# Patient Record
Sex: MALE | Race: BLACK OR AFRICAN AMERICAN | NOT HISPANIC OR LATINO | Employment: UNEMPLOYED | URBAN - METROPOLITAN AREA
[De-identification: names, ages, dates, MRNs, and addresses within clinical notes are randomized per-mention and may not be internally consistent; named-entity substitution may affect disease eponyms.]

---

## 2017-01-12 ENCOUNTER — HISTORICAL (OUTPATIENT)
Dept: INTERNAL MEDICINE | Facility: CLINIC | Age: 67
End: 2017-01-12

## 2017-02-06 ENCOUNTER — HISTORICAL (OUTPATIENT)
Dept: INTERNAL MEDICINE | Facility: CLINIC | Age: 67
End: 2017-02-06

## 2017-04-18 ENCOUNTER — HISTORICAL (OUTPATIENT)
Dept: INTERNAL MEDICINE | Facility: CLINIC | Age: 67
End: 2017-04-18

## 2017-08-28 LAB
COLOR STL: NORMAL
CONSISTENCY STL: NORMAL
HEMOCCULT SP1 STL QL: NEGATIVE

## 2017-08-29 LAB
COLOR STL: NORMAL
CONSISTENCY STL: NORMAL
HEMOCCULT SP2 STL QL: NEGATIVE

## 2017-08-30 LAB
COLOR STL: NORMAL
CONSISTENCY STL: NORMAL

## 2017-09-01 ENCOUNTER — HISTORICAL (OUTPATIENT)
Dept: INTERNAL MEDICINE | Facility: CLINIC | Age: 67
End: 2017-09-01

## 2018-02-16 ENCOUNTER — HISTORICAL (OUTPATIENT)
Dept: INTERNAL MEDICINE | Facility: CLINIC | Age: 68
End: 2018-02-16

## 2018-02-16 LAB
ABS NEUT (OLG): 3.01 X10(3)/MCL (ref 2.1–9.2)
ALBUMIN SERPL-MCNC: 3.4 GM/DL (ref 3.4–5)
ALBUMIN/GLOB SERPL: 1 RATIO (ref 1–2)
ALP SERPL-CCNC: 58 UNIT/L (ref 45–117)
ALT SERPL-CCNC: 26 UNIT/L (ref 12–78)
APPEARANCE, UA: CLEAR
AST SERPL-CCNC: 15 UNIT/L (ref 15–37)
BACTERIA #/AREA URNS AUTO: ABNORMAL /[HPF]
BASOPHILS # BLD AUTO: 0.03 X10(3)/MCL
BASOPHILS NFR BLD AUTO: 1 % (ref 0–1)
BILIRUB SERPL-MCNC: 0.6 MG/DL (ref 0.2–1)
BILIRUB UR QL STRIP: NEGATIVE
BILIRUBIN DIRECT+TOT PNL SERPL-MCNC: 0.2 MG/DL
BILIRUBIN DIRECT+TOT PNL SERPL-MCNC: 0.4 MG/DL
BUN SERPL-MCNC: 20 MG/DL (ref 7–18)
CALCIUM SERPL-MCNC: 9.5 MG/DL (ref 8.5–10.1)
CHLORIDE SERPL-SCNC: 105 MMOL/L (ref 98–107)
CHOLEST SERPL-MCNC: 162 MG/DL
CHOLEST/HDLC SERPL: 2.4 {RATIO} (ref 0–5)
CO2 SERPL-SCNC: 26 MMOL/L (ref 21–32)
COLOR UR: YELLOW
CREAT SERPL-MCNC: 1.1 MG/DL (ref 0.6–1.3)
CREAT UR-MCNC: 214 MG/DL
DEPRECATED CALCIDIOL+CALCIFEROL SERPL-MC: 19.98 NG/ML (ref 30–80)
EOSINOPHIL # BLD AUTO: 0.12 X10(3)/MCL
EOSINOPHIL NFR BLD AUTO: 2 % (ref 0–5)
ERYTHROCYTE [DISTWIDTH] IN BLOOD BY AUTOMATED COUNT: 12.9 % (ref 11.5–14.5)
EST. AVERAGE GLUCOSE BLD GHB EST-MCNC: 154 MG/DL
GLOBULIN SER-MCNC: 4 GM/ML (ref 2.3–3.5)
GLUCOSE (UA): NORMAL
GLUCOSE SERPL-MCNC: 149 MG/DL (ref 74–106)
HBA1C MFR BLD: 7 % (ref 4.2–6.3)
HCT VFR BLD AUTO: 40.2 % (ref 40–51)
HDLC SERPL-MCNC: 67 MG/DL
HGB BLD-MCNC: 13.5 GM/DL (ref 13.5–17.5)
HGB UR QL STRIP: NEGATIVE
HYALINE CASTS #/AREA URNS LPF: ABNORMAL /[LPF]
IMM GRANULOCYTES # BLD AUTO: 0.01 10*3/UL
IMM GRANULOCYTES NFR BLD AUTO: 0 %
KETONES UR QL STRIP: NEGATIVE
LDLC SERPL CALC-MCNC: 85 MG/DL (ref 0–130)
LEUKOCYTE ESTERASE UR QL STRIP: NEGATIVE
LYMPHOCYTES # BLD AUTO: 1.1 X10(3)/MCL
LYMPHOCYTES NFR BLD AUTO: 23 % (ref 15–40)
MCH RBC QN AUTO: 30.8 PG (ref 26–34)
MCHC RBC AUTO-ENTMCNC: 33.6 GM/DL (ref 31–37)
MCV RBC AUTO: 91.6 FL (ref 80–100)
MONOCYTES # BLD AUTO: 0.45 X10(3)/MCL
MONOCYTES NFR BLD AUTO: 10 % (ref 4–12)
NEUTROPHILS # BLD AUTO: 3.01 X10(3)/MCL
NEUTROPHILS NFR BLD AUTO: 64 X10(3)/MCL
NITRITE UR QL STRIP: NEGATIVE
PH UR STRIP: 5.5 [PH] (ref 4.5–8)
PLATELET # BLD AUTO: 240 X10(3)/MCL (ref 130–400)
PMV BLD AUTO: 10.5 FL (ref 7.4–10.4)
POTASSIUM SERPL-SCNC: 4.5 MMOL/L (ref 3.5–5.1)
PROT SERPL-MCNC: 7.4 GM/DL (ref 6.4–8.2)
PROT UR QL STRIP: NEGATIVE
PROT UR STRIP-MCNC: 12.7 MG/DL
PROT/CREAT UR-RTO: 59.3 MG/GM
RBC # BLD AUTO: 4.39 X10(6)/MCL (ref 4.5–5.9)
RBC #/AREA URNS AUTO: ABNORMAL /[HPF]
SODIUM SERPL-SCNC: 140 MMOL/L (ref 136–145)
SP GR UR STRIP: 1.02 (ref 1–1.03)
SQUAMOUS #/AREA URNS LPF: ABNORMAL /[LPF]
TRIGL SERPL-MCNC: 50 MG/DL
TSH SERPL-ACNC: 1.66 MIU/L (ref 0.36–3.74)
UROBILINOGEN UR STRIP-ACNC: NORMAL
VLDLC SERPL CALC-MCNC: 10 MG/DL
WBC # SPEC AUTO: 4.7 X10(3)/MCL (ref 4.5–11)
WBC #/AREA URNS AUTO: ABNORMAL /HPF

## 2018-05-01 ENCOUNTER — HISTORICAL (OUTPATIENT)
Dept: INTERNAL MEDICINE | Facility: CLINIC | Age: 68
End: 2018-05-01

## 2018-05-01 LAB
ABS NEUT (OLG): 2.11 X10(3)/MCL (ref 2.1–9.2)
ALBUMIN SERPL-MCNC: 3.6 GM/DL (ref 3.4–5)
ALBUMIN/GLOB SERPL: 1 RATIO (ref 1–2)
ALP SERPL-CCNC: 66 UNIT/L (ref 45–117)
ALT SERPL-CCNC: 31 UNIT/L (ref 12–78)
AST SERPL-CCNC: 22 UNIT/L (ref 15–37)
BASOPHILS # BLD AUTO: 0.02 X10(3)/MCL
BASOPHILS NFR BLD AUTO: 1 %
BILIRUB SERPL-MCNC: 0.4 MG/DL (ref 0.2–1)
BILIRUBIN DIRECT+TOT PNL SERPL-MCNC: 0.1 MG/DL
BILIRUBIN DIRECT+TOT PNL SERPL-MCNC: 0.3 MG/DL
BUN SERPL-MCNC: 26 MG/DL (ref 7–18)
CALCIUM SERPL-MCNC: 9.2 MG/DL (ref 8.5–10.1)
CHLORIDE SERPL-SCNC: 105 MMOL/L (ref 98–107)
CO2 SERPL-SCNC: 31 MMOL/L (ref 21–32)
CREAT SERPL-MCNC: 1.1 MG/DL (ref 0.6–1.3)
EOSINOPHIL # BLD AUTO: 0.12 X10(3)/MCL
EOSINOPHIL NFR BLD AUTO: 3 %
ERYTHROCYTE [DISTWIDTH] IN BLOOD BY AUTOMATED COUNT: 13.2 % (ref 11.5–14.5)
EST. AVERAGE GLUCOSE BLD GHB EST-MCNC: 154 MG/DL
GLOBULIN SER-MCNC: 3.6 GM/ML (ref 2.3–3.5)
GLUCOSE SERPL-MCNC: 209 MG/DL (ref 74–106)
HBA1C MFR BLD: 7 % (ref 4.2–6.3)
HCT VFR BLD AUTO: 40.6 % (ref 40–51)
HGB BLD-MCNC: 13.5 GM/DL (ref 13.5–17.5)
LYMPHOCYTES # BLD AUTO: 1.01 X10(3)/MCL
LYMPHOCYTES NFR BLD AUTO: 29 % (ref 13–40)
MCH RBC QN AUTO: 31 PG (ref 26–34)
MCHC RBC AUTO-ENTMCNC: 33.3 GM/DL (ref 31–37)
MCV RBC AUTO: 93.1 FL (ref 80–100)
MONOCYTES # BLD AUTO: 0.26 X10(3)/MCL
MONOCYTES NFR BLD AUTO: 7 % (ref 4–12)
NEUTROPHILS # BLD AUTO: 2.11 X10(3)/MCL
NEUTROPHILS NFR BLD AUTO: 60 X10(3)/MCL
PLATELET # BLD AUTO: 226 X10(3)/MCL (ref 130–400)
PMV BLD AUTO: 10.5 FL (ref 7.4–10.4)
POTASSIUM SERPL-SCNC: 4.4 MMOL/L (ref 3.5–5.1)
PROT SERPL-MCNC: 7.2 GM/DL (ref 6.4–8.2)
RBC # BLD AUTO: 4.36 X10(6)/MCL (ref 4.5–5.9)
SODIUM SERPL-SCNC: 138 MMOL/L (ref 136–145)
WBC # SPEC AUTO: 3.5 X10(3)/MCL (ref 4.5–11)

## 2018-05-27 ENCOUNTER — HISTORICAL (OUTPATIENT)
Dept: LAB | Facility: HOSPITAL | Age: 68
End: 2018-05-27

## 2018-05-27 LAB
ABS NEUT (OLG): 1.99 X10(3)/MCL (ref 2.1–9.2)
ALBUMIN SERPL-MCNC: 3.4 GM/DL (ref 3.4–5)
ALBUMIN/GLOB SERPL: 1 RATIO (ref 1–2)
ALP SERPL-CCNC: 57 UNIT/L (ref 45–117)
ALT SERPL-CCNC: 24 UNIT/L (ref 12–78)
APPEARANCE, UA: CLEAR
AST SERPL-CCNC: 17 UNIT/L (ref 15–37)
BACTERIA #/AREA URNS AUTO: ABNORMAL /[HPF]
BASOPHILS # BLD AUTO: 0.02 X10(3)/MCL
BASOPHILS NFR BLD AUTO: 1 %
BILIRUB SERPL-MCNC: 0.4 MG/DL (ref 0.2–1)
BILIRUB UR QL STRIP: NEGATIVE
BILIRUBIN DIRECT+TOT PNL SERPL-MCNC: 0.1 MG/DL
BILIRUBIN DIRECT+TOT PNL SERPL-MCNC: 0.3 MG/DL
BUN SERPL-MCNC: 27 MG/DL (ref 7–18)
CALCIUM SERPL-MCNC: 8.5 MG/DL (ref 8.5–10.1)
CHLORIDE SERPL-SCNC: 109 MMOL/L (ref 98–107)
CO2 SERPL-SCNC: 26 MMOL/L (ref 21–32)
COLOR UR: NORMAL
CREAT SERPL-MCNC: 1.1 MG/DL (ref 0.6–1.3)
CREAT UR-MCNC: 120 MG/DL
DEPRECATED CALCIDIOL+CALCIFEROL SERPL-MC: 25.57 NG/ML (ref 30–80)
EOSINOPHIL # BLD AUTO: 0.12 X10(3)/MCL
EOSINOPHIL NFR BLD AUTO: 4 %
ERYTHROCYTE [DISTWIDTH] IN BLOOD BY AUTOMATED COUNT: 13.1 % (ref 11.5–14.5)
GLOBULIN SER-MCNC: 3.4 GM/ML (ref 2.3–3.5)
GLUCOSE (UA): NORMAL
GLUCOSE SERPL-MCNC: 144 MG/DL (ref 74–106)
HCT VFR BLD AUTO: 38.5 % (ref 40–51)
HGB BLD-MCNC: 12.7 GM/DL (ref 13.5–17.5)
HGB UR QL STRIP: NEGATIVE
HYALINE CASTS #/AREA URNS LPF: ABNORMAL /[LPF]
KETONES UR QL STRIP: NEGATIVE
LEUKOCYTE ESTERASE UR QL STRIP: NEGATIVE
LYMPHOCYTES # BLD AUTO: 0.85 X10(3)/MCL
LYMPHOCYTES NFR BLD AUTO: 26 % (ref 13–40)
MAGNESIUM SERPL-MCNC: 1.7 MG/DL (ref 1.8–2.4)
MCH RBC QN AUTO: 30.5 PG (ref 26–34)
MCHC RBC AUTO-ENTMCNC: 33 GM/DL (ref 31–37)
MCV RBC AUTO: 92.5 FL (ref 80–100)
MONOCYTES # BLD AUTO: 0.29 X10(3)/MCL
MONOCYTES NFR BLD AUTO: 9 % (ref 4–12)
NEUTROPHILS # BLD AUTO: 1.99 X10(3)/MCL
NEUTROPHILS NFR BLD AUTO: 61 X10(3)/MCL
NITRITE UR QL STRIP: NEGATIVE
PH UR STRIP: 5.5 [PH] (ref 4.5–8)
PHOSPHATE SERPL-MCNC: 2.7 MG/DL (ref 2.5–4.9)
PLATELET # BLD AUTO: 196 X10(3)/MCL (ref 130–400)
PMV BLD AUTO: 10.2 FL (ref 7.4–10.4)
POTASSIUM SERPL-SCNC: 4.3 MMOL/L (ref 3.5–5.1)
PROT SERPL-MCNC: 6.8 GM/DL (ref 6.4–8.2)
PROT UR QL STRIP: NEGATIVE
PROT UR STRIP-MCNC: 11.6 MG/DL
PROT/CREAT UR-RTO: 96.7 MG/GM
PTH-INTACT SERPL-MCNC: 53.2 PG/ML (ref 18.4–80.1)
RBC # BLD AUTO: 4.16 X10(6)/MCL (ref 4.5–5.9)
RBC #/AREA URNS AUTO: ABNORMAL /[HPF]
SODIUM SERPL-SCNC: 141 MMOL/L (ref 136–145)
SP GR UR STRIP: 1.02 (ref 1–1.03)
SQUAMOUS #/AREA URNS LPF: ABNORMAL /[LPF]
UROBILINOGEN UR STRIP-ACNC: NORMAL
WBC # SPEC AUTO: 3.3 X10(3)/MCL (ref 4.5–11)
WBC #/AREA URNS AUTO: ABNORMAL /HPF

## 2018-08-23 ENCOUNTER — HISTORICAL (OUTPATIENT)
Dept: RADIOLOGY | Facility: HOSPITAL | Age: 68
End: 2018-08-23

## 2018-08-23 ENCOUNTER — HISTORICAL (OUTPATIENT)
Dept: ADMINISTRATIVE | Facility: HOSPITAL | Age: 68
End: 2018-08-23

## 2018-08-23 LAB
ABS NEUT (OLG): 3.64 X10(3)/MCL (ref 2.1–9.2)
BASOPHILS # BLD AUTO: 0.03 X10(3)/MCL
BASOPHILS NFR BLD AUTO: 1 %
EOSINOPHIL # BLD AUTO: 0.09 10*3/UL
EOSINOPHIL NFR BLD AUTO: 2 %
ERYTHROCYTE [DISTWIDTH] IN BLOOD BY AUTOMATED COUNT: 13.3 % (ref 11.5–14.5)
EST. AVERAGE GLUCOSE BLD GHB EST-MCNC: 148 MG/DL
HBA1C MFR BLD: 6.8 % (ref 4.2–6.3)
HCT VFR BLD AUTO: 37.4 % (ref 40–51)
HGB BLD-MCNC: 12.4 GM/DL (ref 13.5–17.5)
IMM GRANULOCYTES # BLD AUTO: 0.01 10*3/UL
IMM GRANULOCYTES NFR BLD AUTO: 0 %
LYMPHOCYTES # BLD AUTO: 1.09 X10(3)/MCL
LYMPHOCYTES NFR BLD AUTO: 20 % (ref 13–40)
MCH RBC QN AUTO: 30.8 PG (ref 26–34)
MCHC RBC AUTO-ENTMCNC: 33.2 GM/DL (ref 31–37)
MCV RBC AUTO: 92.8 FL (ref 80–100)
MONOCYTES # BLD AUTO: 0.54 X10(3)/MCL
MONOCYTES NFR BLD AUTO: 10 % (ref 4–12)
NEUTROPHILS # BLD AUTO: 3.64 X10(3)/MCL
NEUTROPHILS NFR BLD AUTO: 67 X10(3)/MCL
PLATELET # BLD AUTO: 288 X10(3)/MCL (ref 130–400)
PMV BLD AUTO: 9.7 FL (ref 7.4–10.4)
RBC # BLD AUTO: 4.03 X10(6)/MCL (ref 4.5–5.9)
WBC # SPEC AUTO: 5.4 X10(3)/MCL (ref 4.5–11)

## 2018-09-06 ENCOUNTER — HISTORICAL (OUTPATIENT)
Dept: RADIOLOGY | Facility: HOSPITAL | Age: 68
End: 2018-09-06

## 2018-09-06 LAB
ABS NEUT (OLG): 2.7 X10(3)/MCL (ref 2.1–9.2)
ALBUMIN SERPL-MCNC: 3.8 GM/DL (ref 3.4–5)
ALBUMIN/GLOB SERPL: 1 RATIO (ref 1–2)
ALP SERPL-CCNC: 70 UNIT/L (ref 45–117)
ALT SERPL-CCNC: 29 UNIT/L (ref 12–78)
APPEARANCE, UA: CLEAR
AST SERPL-CCNC: 20 UNIT/L (ref 15–37)
BASOPHILS # BLD AUTO: 0.02 X10(3)/MCL
BASOPHILS NFR BLD AUTO: 0 %
BILIRUB SERPL-MCNC: 0.3 MG/DL (ref 0.2–1)
BILIRUB UR QL STRIP: NEGATIVE MG/DL
BILIRUBIN DIRECT+TOT PNL SERPL-MCNC: <0.1 MG/DL
BILIRUBIN DIRECT+TOT PNL SERPL-MCNC: ABNORMAL MG/DL
BUN SERPL-MCNC: 33 MG/DL (ref 7–18)
CALCIUM SERPL-MCNC: 9.3 MG/DL (ref 8.5–10.1)
CHLORIDE SERPL-SCNC: 105 MMOL/L (ref 98–107)
CO2 SERPL-SCNC: 31 MMOL/L (ref 21–32)
COLOR UR: NORMAL
CREAT SERPL-MCNC: 1.2 MG/DL (ref 0.6–1.3)
CREAT UR-MCNC: 137 MG/DL
DEPRECATED CALCIDIOL+CALCIFEROL SERPL-MC: 33.22 NG/ML (ref 30–80)
EOSINOPHIL # BLD AUTO: 0.11 10*3/UL
EOSINOPHIL NFR BLD AUTO: 3 %
ERYTHROCYTE [DISTWIDTH] IN BLOOD BY AUTOMATED COUNT: 13.3 % (ref 11.5–14.5)
GLOBULIN SER-MCNC: 4 GM/ML (ref 2.3–3.5)
GLUCOSE (UA): NORMAL MG/DL
GLUCOSE SERPL-MCNC: 154 MG/DL (ref 74–106)
HCT VFR BLD AUTO: 38.4 % (ref 40–51)
HGB BLD-MCNC: 12.5 GM/DL (ref 13.5–17.5)
HGB UR QL STRIP: NEGATIVE MG/DL
IMM GRANULOCYTES # BLD AUTO: 0.01 10*3/UL
IMM GRANULOCYTES NFR BLD AUTO: 0 %
KETONES UR QL STRIP: NEGATIVE MG/DL
LEUKOCYTE ESTERASE UR QL STRIP: NEGATIVE LEU/UL
LYMPHOCYTES # BLD AUTO: 1.08 X10(3)/MCL
LYMPHOCYTES NFR BLD AUTO: 25 % (ref 13–40)
MAGNESIUM SERPL-MCNC: 2.2 MG/DL (ref 1.8–2.4)
MCH RBC QN AUTO: 30.2 PG (ref 26–34)
MCHC RBC AUTO-ENTMCNC: 32.6 GM/DL (ref 31–37)
MCV RBC AUTO: 92.8 FL (ref 80–100)
MONOCYTES # BLD AUTO: 0.35 X10(3)/MCL
MONOCYTES NFR BLD AUTO: 8 % (ref 4–12)
NEUTROPHILS # BLD AUTO: 2.7 X10(3)/MCL
NEUTROPHILS NFR BLD AUTO: 63 X10(3)/MCL
NITRITE UR QL STRIP: NEGATIVE
PH UR STRIP: 5.5 [PH] (ref 4.5–8)
PHOSPHATE SERPL-MCNC: 3.5 MG/DL (ref 2.5–4.9)
PLATELET # BLD AUTO: 261 X10(3)/MCL (ref 130–400)
PMV BLD AUTO: 10.1 FL (ref 7.4–10.4)
POTASSIUM SERPL-SCNC: 4.7 MMOL/L (ref 3.5–5.1)
PROT SERPL-MCNC: 7.8 GM/DL (ref 6.4–8.2)
PROT UR QL STRIP: NEGATIVE MG/DL
PROT UR STRIP-MCNC: 11.3 MG/DL
PROT/CREAT UR-RTO: 82.5 MG/GM
PTH-INTACT SERPL-MCNC: 53.4 PG/ML (ref 18.4–80.1)
RBC # BLD AUTO: 4.14 X10(6)/MCL (ref 4.5–5.9)
SODIUM SERPL-SCNC: 139 MMOL/L (ref 136–145)
SP GR UR STRIP: 1.02 (ref 1–1.03)
UROBILINOGEN UR STRIP-ACNC: NORMAL MG/DL
WBC # SPEC AUTO: 4.3 X10(3)/MCL (ref 4.5–11)

## 2019-02-27 ENCOUNTER — HISTORICAL (OUTPATIENT)
Dept: ADMINISTRATIVE | Facility: HOSPITAL | Age: 69
End: 2019-02-27

## 2019-02-27 LAB
CHOLEST SERPL-MCNC: 181 MG/DL
CHOLEST/HDLC SERPL: 2.4 {RATIO} (ref 0–5)
CREAT UR-MCNC: 97 MG/DL
EST. AVERAGE GLUCOSE BLD GHB EST-MCNC: 157 MG/DL
FERRITIN SERPL-MCNC: 86.6 NG/ML (ref 26–388)
FOLATE SERPL-MCNC: 14.7 NG/ML (ref 3.1–17.5)
HBA1C MFR BLD: 7.1 % (ref 4.2–6.3)
HDLC SERPL-MCNC: 75 MG/DL
IRON SATN MFR SERPL: 33 % (ref 15–50)
IRON SERPL-MCNC: 105 MCG/DL (ref 65–175)
LDLC SERPL CALC-MCNC: 93 MG/DL (ref 0–130)
MICROALBUMIN UR-MCNC: <5 MG/L (ref 0–19)
MICROALBUMIN/CREAT RATIO PNL UR: <5.2 MCG/MG CR (ref 0–29)
TIBC SERPL-MCNC: 318 MCG/DL (ref 250–450)
TRANSFERRIN SERPL-MCNC: 262 MG/DL (ref 200–360)
TRIGL SERPL-MCNC: 63 MG/DL
VIT B12 SERPL-MCNC: 330 PG/ML (ref 193–986)
VLDLC SERPL CALC-MCNC: 13 MG/DL

## 2019-05-13 ENCOUNTER — HISTORICAL (OUTPATIENT)
Dept: NEPHROLOGY | Facility: CLINIC | Age: 69
End: 2019-05-13

## 2019-05-13 LAB
ABS NEUT (OLG): 2.46 X10(3)/MCL (ref 2.1–9.2)
ALBUMIN SERPL-MCNC: 3.8 GM/DL (ref 3.4–5)
ALBUMIN/GLOB SERPL: 1.1 RATIO (ref 1.1–2)
ALP SERPL-CCNC: 62 UNIT/L (ref 45–117)
ALT SERPL-CCNC: 34 UNIT/L (ref 12–78)
APPEARANCE, UA: CLEAR
AST SERPL-CCNC: 22 UNIT/L (ref 15–37)
BACTERIA #/AREA URNS AUTO: ABNORMAL /[HPF]
BASOPHILS # BLD AUTO: 0.02 X10(3)/MCL
BASOPHILS NFR BLD AUTO: 0 %
BILIRUB SERPL-MCNC: 0.5 MG/DL (ref 0.2–1)
BILIRUB UR QL STRIP: NEGATIVE
BILIRUBIN DIRECT+TOT PNL SERPL-MCNC: 0.2 MG/DL
BILIRUBIN DIRECT+TOT PNL SERPL-MCNC: 0.3 MG/DL
BUN SERPL-MCNC: 23 MG/DL (ref 7–18)
CALCIUM SERPL-MCNC: 9.6 MG/DL (ref 8.5–10.1)
CHLORIDE SERPL-SCNC: 103 MMOL/L (ref 98–107)
CO2 SERPL-SCNC: 31 MMOL/L (ref 21–32)
COLOR UR: COLORLESS
CREAT SERPL-MCNC: 1.3 MG/DL (ref 0.6–1.3)
CREAT UR-MCNC: 34 MG/DL
EOSINOPHIL # BLD AUTO: 0.08 10*3/UL
EOSINOPHIL NFR BLD AUTO: 2 %
ERYTHROCYTE [DISTWIDTH] IN BLOOD BY AUTOMATED COUNT: 13 % (ref 11.5–14.5)
GLOBULIN SER-MCNC: 3.5 GM/ML (ref 2.3–3.5)
GLUCOSE (UA): 500 MG/DL
GLUCOSE SERPL-MCNC: 256 MG/DL (ref 74–106)
HCT VFR BLD AUTO: 41.5 % (ref 40–51)
HGB BLD-MCNC: 13.5 GM/DL (ref 13.5–17.5)
HGB UR QL STRIP: NEGATIVE
HYALINE CASTS #/AREA URNS LPF: ABNORMAL /[LPF]
IMM GRANULOCYTES # BLD AUTO: 0.02 10*3/UL
IMM GRANULOCYTES NFR BLD AUTO: 0 %
KETONES UR QL STRIP: NEGATIVE
LEUKOCYTE ESTERASE UR QL STRIP: NEGATIVE
LYMPHOCYTES # BLD AUTO: 1.06 X10(3)/MCL
LYMPHOCYTES NFR BLD AUTO: 27 % (ref 13–40)
MAGNESIUM SERPL-MCNC: 2 MG/DL (ref 1.6–2.6)
MCH RBC QN AUTO: 30.5 PG (ref 26–34)
MCHC RBC AUTO-ENTMCNC: 32.5 GM/DL (ref 31–37)
MCV RBC AUTO: 93.7 FL (ref 80–100)
MONOCYTES # BLD AUTO: 0.34 X10(3)/MCL
MONOCYTES NFR BLD AUTO: 8 % (ref 4–12)
NEUTROPHILS # BLD AUTO: 2.46 X10(3)/MCL
NEUTROPHILS NFR BLD AUTO: 62 X10(3)/MCL
NITRITE UR QL STRIP: NEGATIVE
PH UR STRIP: 5.5 [PH] (ref 4.5–8)
PHOSPHATE SERPL-MCNC: 3.1 MG/DL (ref 2.5–4.9)
PLATELET # BLD AUTO: 213 X10(3)/MCL (ref 130–400)
PMV BLD AUTO: 10.6 FL (ref 7.4–10.4)
POTASSIUM SERPL-SCNC: 4.2 MMOL/L (ref 3.5–5.1)
PROT SERPL-MCNC: 7.3 GM/DL (ref 6.4–8.2)
PROT UR QL STRIP: NEGATIVE
PROT UR STRIP-MCNC: <5 MG/DL
PROT/CREAT UR-RTO: NORMAL MG/GM
PTH-INTACT SERPL-MCNC: 49.6 PG/ML (ref 18.4–80.1)
RBC # BLD AUTO: 4.43 X10(6)/MCL (ref 4.5–5.9)
RBC #/AREA URNS AUTO: ABNORMAL /[HPF]
SODIUM SERPL-SCNC: 138 MMOL/L (ref 136–145)
SP GR UR STRIP: 1 (ref 1–1.03)
SQUAMOUS #/AREA URNS LPF: ABNORMAL /[LPF]
UROBILINOGEN UR STRIP-ACNC: NORMAL
WBC # SPEC AUTO: 4 X10(3)/MCL (ref 4.5–11)
WBC #/AREA URNS AUTO: ABNORMAL /HPF

## 2019-10-14 ENCOUNTER — HISTORICAL (OUTPATIENT)
Dept: RADIOLOGY | Facility: HOSPITAL | Age: 69
End: 2019-10-14

## 2019-10-14 LAB
ABS NEUT (OLG): 2.08 X10(3)/MCL (ref 2.1–9.2)
ALBUMIN SERPL-MCNC: 3.6 GM/DL (ref 3.4–5)
ALBUMIN/GLOB SERPL: 1 RATIO (ref 1.1–2)
ALP SERPL-CCNC: 60 UNIT/L (ref 45–117)
ALT SERPL-CCNC: 28 UNIT/L (ref 12–78)
AST SERPL-CCNC: 17 UNIT/L (ref 15–37)
BASOPHILS # BLD AUTO: 0 X10(3)/MCL (ref 0–0.2)
BASOPHILS NFR BLD AUTO: 1 %
BILIRUB SERPL-MCNC: 0.4 MG/DL (ref 0.2–1)
BILIRUBIN DIRECT+TOT PNL SERPL-MCNC: 0.1 MG/DL (ref 0–0.2)
BILIRUBIN DIRECT+TOT PNL SERPL-MCNC: 0.3 MG/DL
BUN SERPL-MCNC: 29 MG/DL (ref 7–18)
CALCIUM SERPL-MCNC: 9 MG/DL (ref 8.5–10.1)
CHLORIDE SERPL-SCNC: 103 MMOL/L (ref 98–107)
CHOLEST SERPL-MCNC: 143 MG/DL
CHOLEST/HDLC SERPL: 2 {RATIO} (ref 0–5)
CO2 SERPL-SCNC: 30 MMOL/L (ref 21–32)
CREAT SERPL-MCNC: 1.3 MG/DL (ref 0.6–1.3)
CREAT UR-MCNC: 20 MG/DL
CREAT UR-MCNC: 20 MG/DL
DEPRECATED CALCIDIOL+CALCIFEROL SERPL-MC: 45.16 NG/ML (ref 30–80)
EOSINOPHIL # BLD AUTO: 0.2 X10(3)/MCL (ref 0–0.9)
EOSINOPHIL NFR BLD AUTO: 5 %
ERYTHROCYTE [DISTWIDTH] IN BLOOD BY AUTOMATED COUNT: 12.8 % (ref 11.5–14.5)
EST. AVERAGE GLUCOSE BLD GHB EST-MCNC: 148 MG/DL
GLOBULIN SER-MCNC: 3.7 GM/ML (ref 2.3–3.5)
GLUCOSE SERPL-MCNC: 216 MG/DL (ref 74–106)
HBA1C MFR BLD: 6.8 % (ref 4.2–6.3)
HCT VFR BLD AUTO: 42.5 % (ref 40–51)
HDLC SERPL-MCNC: 70 MG/DL (ref 40–59)
HGB BLD-MCNC: 13.7 GM/DL (ref 13.5–17.5)
IMM GRANULOCYTES # BLD AUTO: 0.01 10*3/UL
IMM GRANULOCYTES NFR BLD AUTO: 0 %
LDLC SERPL CALC-MCNC: 66 MG/DL
LYMPHOCYTES # BLD AUTO: 1.1 X10(3)/MCL (ref 0.6–4.6)
LYMPHOCYTES NFR BLD AUTO: 29 %
MCH RBC QN AUTO: 30.7 PG (ref 26–34)
MCHC RBC AUTO-ENTMCNC: 32.2 GM/DL (ref 31–37)
MCV RBC AUTO: 95.3 FL (ref 80–100)
MICROALBUMIN UR-MCNC: <5 MG/L (ref 0–19)
MICROALBUMIN/CREAT RATIO PNL UR: NORMAL MCG/MG CR (ref 0–29)
MONOCYTES # BLD AUTO: 0.3 X10(3)/MCL (ref 0.1–1.3)
MONOCYTES NFR BLD AUTO: 9 %
NEUTROPHILS # BLD AUTO: 2.08 X10(3)/MCL (ref 2.1–9.2)
NEUTROPHILS NFR BLD AUTO: 56 %
PLATELET # BLD AUTO: 218 X10(3)/MCL (ref 130–400)
PMV BLD AUTO: 9.8 FL (ref 7.4–10.4)
POTASSIUM SERPL-SCNC: 4.3 MMOL/L (ref 3.5–5.1)
PROT SERPL-MCNC: 7.3 GM/DL (ref 6.4–8.2)
PROT UR STRIP-MCNC: <5 MG/DL
PROT/CREAT UR-RTO: NORMAL MG/GM
RBC # BLD AUTO: 4.46 X10(6)/MCL (ref 4.5–5.9)
SODIUM SERPL-SCNC: 138 MMOL/L (ref 136–145)
TRIGL SERPL-MCNC: 35 MG/DL
TSH SERPL-ACNC: 2.81 MIU/L (ref 0.36–3.74)
VIT B12 SERPL-MCNC: 328 PG/ML (ref 193–986)
VLDLC SERPL CALC-MCNC: 7 MG/DL
WBC # SPEC AUTO: 3.7 X10(3)/MCL (ref 4.5–11)

## 2019-11-18 ENCOUNTER — HISTORICAL (OUTPATIENT)
Dept: NEPHROLOGY | Facility: CLINIC | Age: 69
End: 2019-11-18

## 2019-11-18 LAB
ABS NEUT (OLG): 2.72 X10(3)/MCL (ref 2.1–9.2)
ALBUMIN SERPL-MCNC: 3.6 GM/DL (ref 3.4–5)
ALBUMIN/GLOB SERPL: 1 RATIO (ref 1.1–2)
ALP SERPL-CCNC: 61 UNIT/L (ref 45–117)
ALT SERPL-CCNC: 29 UNIT/L (ref 12–78)
APPEARANCE, UA: CLEAR
AST SERPL-CCNC: 20 UNIT/L (ref 15–37)
BACTERIA #/AREA URNS AUTO: ABNORMAL /HPF
BASOPHILS # BLD AUTO: 0 X10(3)/MCL (ref 0–0.2)
BASOPHILS NFR BLD AUTO: 0 %
BILIRUB SERPL-MCNC: 0.4 MG/DL (ref 0.2–1)
BILIRUB UR QL STRIP: NEGATIVE
BILIRUBIN DIRECT+TOT PNL SERPL-MCNC: 0.1 MG/DL (ref 0–0.2)
BILIRUBIN DIRECT+TOT PNL SERPL-MCNC: 0.3 MG/DL
BUN SERPL-MCNC: 22 MG/DL (ref 7–18)
CALCIUM SERPL-MCNC: 9.7 MG/DL (ref 8.5–10.1)
CHLORIDE SERPL-SCNC: 107 MMOL/L (ref 98–107)
CO2 SERPL-SCNC: 31 MMOL/L (ref 21–32)
COLOR UR: NORMAL
CREAT SERPL-MCNC: 1.1 MG/DL (ref 0.6–1.3)
CREAT UR-MCNC: 95 MG/DL
DEPRECATED CALCIDIOL+CALCIFEROL SERPL-MC: 28.09 NG/ML (ref 30–80)
EOSINOPHIL # BLD AUTO: 0.2 X10(3)/MCL (ref 0–0.9)
EOSINOPHIL NFR BLD AUTO: 5 %
ERYTHROCYTE [DISTWIDTH] IN BLOOD BY AUTOMATED COUNT: 13 % (ref 11.5–14.5)
GLOBULIN SER-MCNC: 3.7 GM/ML (ref 2.3–3.5)
GLUCOSE (UA): NEGATIVE
GLUCOSE SERPL-MCNC: 141 MG/DL (ref 74–106)
HCT VFR BLD AUTO: 41.4 % (ref 40–51)
HGB BLD-MCNC: 13.2 GM/DL (ref 13.5–17.5)
HGB UR QL STRIP: NEGATIVE
HYALINE CASTS #/AREA URNS LPF: ABNORMAL /LPF
IMM GRANULOCYTES # BLD AUTO: 0.01 10*3/UL
IMM GRANULOCYTES NFR BLD AUTO: 0 %
KETONES UR QL STRIP: NEGATIVE
LEUKOCYTE ESTERASE UR QL STRIP: NEGATIVE
LYMPHOCYTES # BLD AUTO: 1.1 X10(3)/MCL (ref 0.6–4.6)
LYMPHOCYTES NFR BLD AUTO: 24 %
MAGNESIUM SERPL-MCNC: 2 MG/DL (ref 1.6–2.6)
MCH RBC QN AUTO: 31 PG (ref 26–34)
MCHC RBC AUTO-ENTMCNC: 31.9 GM/DL (ref 31–37)
MCV RBC AUTO: 97.2 FL (ref 80–100)
MONOCYTES # BLD AUTO: 0.5 X10(3)/MCL (ref 0.1–1.3)
MONOCYTES NFR BLD AUTO: 11 %
NEUTROPHILS # BLD AUTO: 2.72 X10(3)/MCL (ref 2.1–9.2)
NEUTROPHILS NFR BLD AUTO: 60 %
NITRITE UR QL STRIP: NEGATIVE
PH UR STRIP: 5.5 [PH] (ref 4.5–8)
PHOSPHATE SERPL-MCNC: 3.7 MG/DL (ref 2.5–4.9)
PLATELET # BLD AUTO: 229 X10(3)/MCL (ref 130–400)
PMV BLD AUTO: 10.3 FL (ref 7.4–10.4)
POTASSIUM SERPL-SCNC: 4.4 MMOL/L (ref 3.5–5.1)
PROT SERPL-MCNC: 7.3 GM/DL (ref 6.4–8.2)
PROT UR QL STRIP: NEGATIVE
PROT UR STRIP-MCNC: 7.3 MG/DL
PROT/CREAT UR-RTO: 76.8 MG/GM
PTH-INTACT SERPL-MCNC: 58.8 PG/ML (ref 18.4–80.1)
RBC # BLD AUTO: 4.26 X10(6)/MCL (ref 4.5–5.9)
RBC #/AREA URNS AUTO: ABNORMAL /HPF
SODIUM SERPL-SCNC: 142 MMOL/L (ref 136–145)
SP GR UR STRIP: 1.01 (ref 1–1.03)
SQUAMOUS #/AREA URNS LPF: ABNORMAL /LPF
UROBILINOGEN UR STRIP-ACNC: NORMAL
WBC # SPEC AUTO: 4.6 X10(3)/MCL (ref 4.5–11)
WBC #/AREA URNS AUTO: ABNORMAL /HPF

## 2020-01-28 ENCOUNTER — HISTORICAL (OUTPATIENT)
Dept: ADMINISTRATIVE | Facility: HOSPITAL | Age: 70
End: 2020-01-28

## 2020-01-28 LAB
ABS NEUT (OLG): 2.28 X10(3)/MCL (ref 2.1–9.2)
ALBUMIN SERPL-MCNC: 3.9 GM/DL (ref 3.4–5)
ALBUMIN/GLOB SERPL: 1 RATIO (ref 1.1–2)
ALP SERPL-CCNC: 64 UNIT/L (ref 45–117)
ALT SERPL-CCNC: 34 UNIT/L (ref 12–78)
AST SERPL-CCNC: 13 UNIT/L (ref 15–37)
BASOPHILS # BLD AUTO: 0 X10(3)/MCL (ref 0–0.2)
BASOPHILS NFR BLD AUTO: 1 %
BILIRUB SERPL-MCNC: 0.4 MG/DL (ref 0.2–1)
BILIRUBIN DIRECT+TOT PNL SERPL-MCNC: 0.2 MG/DL
BILIRUBIN DIRECT+TOT PNL SERPL-MCNC: 0.2 MG/DL (ref 0–0.2)
BUN SERPL-MCNC: 19 MG/DL (ref 7–18)
CALCIUM SERPL-MCNC: 9.8 MG/DL (ref 8.5–10.1)
CHLORIDE SERPL-SCNC: 106 MMOL/L (ref 98–107)
CO2 SERPL-SCNC: 31 MMOL/L (ref 21–32)
CREAT SERPL-MCNC: 1.2 MG/DL (ref 0.6–1.3)
EOSINOPHIL # BLD AUTO: 0.1 X10(3)/MCL (ref 0–0.9)
EOSINOPHIL NFR BLD AUTO: 3 %
ERYTHROCYTE [DISTWIDTH] IN BLOOD BY AUTOMATED COUNT: 13 % (ref 11.5–14.5)
EST. AVERAGE GLUCOSE BLD GHB EST-MCNC: 154 MG/DL
GLOBULIN SER-MCNC: 3.9 GM/ML (ref 2.3–3.5)
GLUCOSE SERPL-MCNC: 180 MG/DL (ref 74–106)
HBA1C MFR BLD: 7 % (ref 4.2–6.3)
HCT VFR BLD AUTO: 42.9 % (ref 40–51)
HGB BLD-MCNC: 13.7 GM/DL (ref 13.5–17.5)
LYMPHOCYTES # BLD AUTO: 0.8 X10(3)/MCL (ref 0.6–4.6)
LYMPHOCYTES NFR BLD AUTO: 24 %
MCH RBC QN AUTO: 30.4 PG (ref 26–34)
MCHC RBC AUTO-ENTMCNC: 31.9 GM/DL (ref 31–37)
MCV RBC AUTO: 95.3 FL (ref 80–100)
MONOCYTES # BLD AUTO: 0.4 X10(3)/MCL (ref 0.1–1.3)
MONOCYTES NFR BLD AUTO: 10 %
NEUTROPHILS # BLD AUTO: 2.28 X10(3)/MCL (ref 2.1–9.2)
NEUTROPHILS NFR BLD AUTO: 63 %
PLATELET # BLD AUTO: 206 X10(3)/MCL (ref 130–400)
PMV BLD AUTO: 10.3 FL (ref 7.4–10.4)
POTASSIUM SERPL-SCNC: 4.1 MMOL/L (ref 3.5–5.1)
PROT SERPL-MCNC: 7.8 GM/DL (ref 6.4–8.2)
RBC # BLD AUTO: 4.5 X10(6)/MCL (ref 4.5–5.9)
SODIUM SERPL-SCNC: 138 MMOL/L (ref 136–145)
VIT B12 SERPL-MCNC: 304 PG/ML (ref 193–986)
WBC # SPEC AUTO: 3.6 X10(3)/MCL (ref 4.5–11)

## 2020-02-06 ENCOUNTER — HISTORICAL (OUTPATIENT)
Dept: INTERNAL MEDICINE | Facility: CLINIC | Age: 70
End: 2020-02-06

## 2020-07-21 ENCOUNTER — HISTORICAL (OUTPATIENT)
Dept: NEPHROLOGY | Facility: CLINIC | Age: 70
End: 2020-07-21

## 2020-07-21 LAB
ABS NEUT (OLG): 3.9 X10(3)/MCL (ref 2.1–9.2)
ALBUMIN SERPL-MCNC: 3.5 GM/DL (ref 3.4–5)
ALBUMIN/GLOB SERPL: 0.9 RATIO (ref 1.1–2)
ALP SERPL-CCNC: 57 UNIT/L (ref 45–117)
ALT SERPL-CCNC: 21 UNIT/L (ref 12–78)
APPEARANCE, UA: CLEAR
AST SERPL-CCNC: 13 UNIT/L (ref 15–37)
BACTERIA #/AREA URNS AUTO: ABNORMAL /HPF
BASOPHILS # BLD AUTO: 0 X10(3)/MCL (ref 0–0.2)
BASOPHILS NFR BLD AUTO: 0 %
BILIRUB SERPL-MCNC: 0.4 MG/DL (ref 0.2–1)
BILIRUB UR QL STRIP: NEGATIVE
BILIRUBIN DIRECT+TOT PNL SERPL-MCNC: 0.2 MG/DL
BILIRUBIN DIRECT+TOT PNL SERPL-MCNC: 0.2 MG/DL (ref 0–0.2)
BUN SERPL-MCNC: 18 MG/DL (ref 7–18)
CALCIUM SERPL-MCNC: 9.4 MG/DL (ref 8.5–10.1)
CHLORIDE SERPL-SCNC: 107 MMOL/L (ref 98–107)
CO2 SERPL-SCNC: 30 MMOL/L (ref 21–32)
COLOR UR: YELLOW
CREAT SERPL-MCNC: 1.1 MG/DL (ref 0.6–1.3)
CREAT UR-MCNC: 185 MG/DL
DEPRECATED CALCIDIOL+CALCIFEROL SERPL-MC: 53.5 NG/ML (ref 30–80)
EOSINOPHIL # BLD AUTO: 0.1 X10(3)/MCL (ref 0–0.9)
EOSINOPHIL NFR BLD AUTO: 2 %
ERYTHROCYTE [DISTWIDTH] IN BLOOD BY AUTOMATED COUNT: 12.9 % (ref 11.5–14.5)
GLOBULIN SER-MCNC: 3.7 GM/ML (ref 2.3–3.5)
GLUCOSE (UA): NEGATIVE
GLUCOSE SERPL-MCNC: 148 MG/DL (ref 74–106)
HCT VFR BLD AUTO: 37.3 % (ref 40–51)
HGB BLD-MCNC: 12.2 GM/DL (ref 13.5–17.5)
HGB UR QL STRIP: NEGATIVE
HYALINE CASTS #/AREA URNS LPF: ABNORMAL /LPF
IMM GRANULOCYTES # BLD AUTO: 0.01 10*3/UL
IMM GRANULOCYTES NFR BLD AUTO: 0 %
KETONES UR QL STRIP: NEGATIVE
LEUKOCYTE ESTERASE UR QL STRIP: NEGATIVE
LYMPHOCYTES # BLD AUTO: 1.1 X10(3)/MCL (ref 0.6–4.6)
LYMPHOCYTES NFR BLD AUTO: 19 %
MAGNESIUM SERPL-MCNC: 2.1 MG/DL (ref 1.6–2.6)
MCH RBC QN AUTO: 31 PG (ref 26–34)
MCHC RBC AUTO-ENTMCNC: 32.7 GM/DL (ref 31–37)
MCV RBC AUTO: 94.7 FL (ref 80–100)
MONOCYTES # BLD AUTO: 0.5 X10(3)/MCL (ref 0.1–1.3)
MONOCYTES NFR BLD AUTO: 9 %
NEUTROPHILS # BLD AUTO: 3.9 X10(3)/MCL (ref 2.1–9.2)
NEUTROPHILS NFR BLD AUTO: 70 %
NITRITE UR QL STRIP: NEGATIVE
PH UR STRIP: 6 [PH] (ref 4.5–8)
PHOSPHATE SERPL-MCNC: 3 MG/DL (ref 2.5–4.9)
PLATELET # BLD AUTO: 217 X10(3)/MCL (ref 130–400)
PMV BLD AUTO: 10.5 FL (ref 7.4–10.4)
POTASSIUM SERPL-SCNC: 4.3 MMOL/L (ref 3.5–5.1)
PROT SERPL-MCNC: 7.2 GM/DL (ref 6.4–8.2)
PROT UR QL STRIP: NEGATIVE
PROT UR STRIP-MCNC: 12.2 MG/DL
PROT/CREAT UR-RTO: 65.9 MG/GM
RBC # BLD AUTO: 3.94 X10(6)/MCL (ref 4.5–5.9)
RBC #/AREA URNS AUTO: ABNORMAL /HPF
SODIUM SERPL-SCNC: 139 MMOL/L (ref 136–145)
SP GR UR STRIP: 1.02 (ref 1–1.03)
SQUAMOUS #/AREA URNS LPF: ABNORMAL /LPF
URATE SERPL-MCNC: 5.6 MG/DL (ref 3.5–7.2)
UROBILINOGEN UR STRIP-ACNC: NORMAL
WBC # SPEC AUTO: 5.6 X10(3)/MCL (ref 4.5–11)
WBC #/AREA URNS AUTO: ABNORMAL /HPF

## 2020-09-29 ENCOUNTER — HISTORICAL (OUTPATIENT)
Dept: INTERNAL MEDICINE | Facility: CLINIC | Age: 70
End: 2020-09-29

## 2020-09-29 LAB
ABS NEUT (OLG): 2.68 X10(3)/MCL (ref 2.1–9.2)
ALBUMIN SERPL-MCNC: 4 GM/DL (ref 3.4–5)
ALBUMIN/GLOB SERPL: 1.1 RATIO (ref 1.1–2)
ALP SERPL-CCNC: 56 UNIT/L (ref 45–117)
ALT SERPL-CCNC: 22 UNIT/L (ref 12–78)
AST SERPL-CCNC: 15 UNIT/L (ref 15–37)
BASOPHILS # BLD AUTO: 0 X10(3)/MCL (ref 0–0.2)
BASOPHILS NFR BLD AUTO: 0 %
BILIRUB SERPL-MCNC: 0.5 MG/DL (ref 0.2–1)
BILIRUBIN DIRECT+TOT PNL SERPL-MCNC: 0.1 MG/DL (ref 0–0.2)
BILIRUBIN DIRECT+TOT PNL SERPL-MCNC: 0.4 MG/DL
BUN SERPL-MCNC: 22 MG/DL (ref 7–18)
CALCIUM SERPL-MCNC: 10.1 MG/DL (ref 8.5–10.1)
CHLORIDE SERPL-SCNC: 104 MMOL/L (ref 98–107)
CHOLEST SERPL-MCNC: 146 MG/DL
CHOLEST/HDLC SERPL: 2 {RATIO} (ref 0–5)
CO2 SERPL-SCNC: 31 MMOL/L (ref 21–32)
CREAT SERPL-MCNC: 1.1 MG/DL (ref 0.6–1.3)
CREAT UR-MCNC: 110 MG/DL
CREAT UR-MCNC: 110 MG/DL
DEPRECATED CALCIDIOL+CALCIFEROL SERPL-MC: 51.2 NG/ML (ref 30–80)
EOSINOPHIL # BLD AUTO: 0.2 X10(3)/MCL (ref 0–0.9)
EOSINOPHIL NFR BLD AUTO: 3 %
ERYTHROCYTE [DISTWIDTH] IN BLOOD BY AUTOMATED COUNT: 12.9 % (ref 11.5–14.5)
EST. AVERAGE GLUCOSE BLD GHB EST-MCNC: 169 MG/DL
GLOBULIN SER-MCNC: 3.7 GM/ML (ref 2.3–3.5)
GLUCOSE SERPL-MCNC: 151 MG/DL (ref 74–106)
HBA1C MFR BLD: 7.5 % (ref 4.2–6.3)
HCT VFR BLD AUTO: 39.9 % (ref 40–51)
HDLC SERPL-MCNC: 73 MG/DL (ref 40–59)
HGB BLD-MCNC: 13 GM/DL (ref 13.5–17.5)
IMM GRANULOCYTES # BLD AUTO: 0.01 10*3/UL
IMM GRANULOCYTES NFR BLD AUTO: 0 %
LDLC SERPL CALC-MCNC: 63 MG/DL
LYMPHOCYTES # BLD AUTO: 1.2 X10(3)/MCL (ref 0.6–4.6)
LYMPHOCYTES NFR BLD AUTO: 27 %
MCH RBC QN AUTO: 31 PG (ref 26–34)
MCHC RBC AUTO-ENTMCNC: 32.6 GM/DL (ref 31–37)
MCV RBC AUTO: 95.2 FL (ref 80–100)
MICROALBUMIN UR-MCNC: 6.4 MG/L (ref 0–19)
MICROALBUMIN/CREAT RATIO PNL UR: 5.8 MCG/MG CR (ref 0–29)
MONOCYTES # BLD AUTO: 0.4 X10(3)/MCL (ref 0.1–1.3)
MONOCYTES NFR BLD AUTO: 8 %
NEUTROPHILS # BLD AUTO: 2.68 X10(3)/MCL (ref 2.1–9.2)
NEUTROPHILS NFR BLD AUTO: 60 %
PLATELET # BLD AUTO: 210 X10(3)/MCL (ref 130–400)
PMV BLD AUTO: 10.1 FL (ref 7.4–10.4)
POTASSIUM SERPL-SCNC: 5 MMOL/L (ref 3.5–5.1)
PROT SERPL-MCNC: 7.7 GM/DL (ref 6.4–8.2)
PROT UR STRIP-MCNC: 8.2 MG/DL
PROT/CREAT UR-RTO: 74.5 MG/GM
RBC # BLD AUTO: 4.19 X10(6)/MCL (ref 4.5–5.9)
SODIUM SERPL-SCNC: 140 MMOL/L (ref 136–145)
TRIGL SERPL-MCNC: 51 MG/DL
VIT B12 SERPL-MCNC: 293 PG/ML (ref 193–986)
VLDLC SERPL CALC-MCNC: 10 MG/DL
WBC # SPEC AUTO: 4.5 X10(3)/MCL (ref 4.5–11)

## 2021-01-13 ENCOUNTER — HISTORICAL (OUTPATIENT)
Dept: NEPHROLOGY | Facility: CLINIC | Age: 71
End: 2021-01-13

## 2021-01-13 LAB
ALBUMIN SERPL-MCNC: 4.1 GM/DL (ref 3.4–4.8)
ALBUMIN/GLOB SERPL: 1.2 RATIO (ref 1.1–2)
ALP SERPL-CCNC: 60 UNIT/L (ref 40–150)
ALT SERPL-CCNC: 32 UNIT/L (ref 0–55)
APPEARANCE, UA: CLEAR
AST SERPL-CCNC: 21 UNIT/L (ref 5–34)
BACTERIA #/AREA URNS AUTO: ABNORMAL /HPF
BILIRUB SERPL-MCNC: 0.6 MG/DL
BILIRUB UR QL STRIP: NEGATIVE
BILIRUBIN DIRECT+TOT PNL SERPL-MCNC: 0.3 MG/DL (ref 0–0.5)
BILIRUBIN DIRECT+TOT PNL SERPL-MCNC: 0.3 MG/DL (ref 0–0.8)
BUN SERPL-MCNC: 18 MG/DL (ref 8.4–25.7)
CALCIUM SERPL-MCNC: 10.3 MG/DL (ref 8.8–10)
CHLORIDE SERPL-SCNC: 103 MMOL/L (ref 98–107)
CO2 SERPL-SCNC: 30 MMOL/L (ref 23–31)
COLOR UR: NORMAL
CREAT SERPL-MCNC: 1.55 MG/DL (ref 0.73–1.18)
CREAT UR-MCNC: 86.7 MG/DL (ref 58–161)
DEPRECATED CALCIDIOL+CALCIFEROL SERPL-MC: 44.3 NG/ML (ref 30–80)
GLOBULIN SER-MCNC: 3.3 GM/DL (ref 2.4–3.5)
GLUCOSE (UA): NEGATIVE
GLUCOSE SERPL-MCNC: 151 MG/DL (ref 82–115)
HGB UR QL STRIP: NEGATIVE
HYALINE CASTS #/AREA URNS LPF: ABNORMAL /LPF
KETONES UR QL STRIP: NEGATIVE
LEUKOCYTE ESTERASE UR QL STRIP: NEGATIVE
NITRITE UR QL STRIP: NEGATIVE
PH UR STRIP: 6.5 [PH] (ref 4.5–8)
POTASSIUM SERPL-SCNC: 4.3 MMOL/L (ref 3.5–5.1)
PROT SERPL-MCNC: 7.4 GM/DL (ref 5.8–7.6)
PROT UR QL STRIP: NEGATIVE
PROT UR STRIP-MCNC: <6.8 MG/DL
PROT/CREAT UR-RTO: <78.4 MG/GM
RBC #/AREA URNS AUTO: ABNORMAL /HPF
SODIUM SERPL-SCNC: 142 MMOL/L (ref 136–145)
SP GR UR STRIP: 1.01 (ref 1–1.03)
SQUAMOUS #/AREA URNS LPF: ABNORMAL /LPF
UROBILINOGEN UR STRIP-ACNC: NORMAL
WBC #/AREA URNS AUTO: ABNORMAL /HPF

## 2021-02-19 ENCOUNTER — HISTORICAL (OUTPATIENT)
Dept: INTERNAL MEDICINE | Facility: CLINIC | Age: 71
End: 2021-02-19

## 2021-02-19 LAB
ABS NEUT (OLG): 1.74 X10(3)/MCL (ref 2.1–9.2)
ALBUMIN SERPL-MCNC: 4.1 GM/DL (ref 3.4–4.8)
ALBUMIN/GLOB SERPL: 1.3 RATIO (ref 1.1–2)
ALP SERPL-CCNC: 55 UNIT/L (ref 40–150)
ALT SERPL-CCNC: 25 UNIT/L (ref 0–55)
AST SERPL-CCNC: 20 UNIT/L (ref 5–34)
BASOPHILS # BLD AUTO: 0 X10(3)/MCL (ref 0–0.2)
BASOPHILS NFR BLD AUTO: 1 %
BILIRUB SERPL-MCNC: 0.5 MG/DL
BILIRUBIN DIRECT+TOT PNL SERPL-MCNC: 0.2 MG/DL (ref 0–0.5)
BILIRUBIN DIRECT+TOT PNL SERPL-MCNC: 0.3 MG/DL (ref 0–0.8)
BUN SERPL-MCNC: 23 MG/DL (ref 8.4–25.7)
CALCIUM SERPL-MCNC: 9.7 MG/DL (ref 8.8–10)
CHLORIDE SERPL-SCNC: 103 MMOL/L (ref 98–107)
CO2 SERPL-SCNC: 30 MMOL/L (ref 23–31)
CREAT SERPL-MCNC: 1.22 MG/DL (ref 0.73–1.18)
DEPRECATED CALCIDIOL+CALCIFEROL SERPL-MC: 46.4 NG/ML (ref 30–80)
EOSINOPHIL # BLD AUTO: 0.2 X10(3)/MCL (ref 0–0.9)
EOSINOPHIL NFR BLD AUTO: 4 %
ERYTHROCYTE [DISTWIDTH] IN BLOOD BY AUTOMATED COUNT: 12.6 % (ref 11.5–14.5)
EST. AVERAGE GLUCOSE BLD GHB EST-MCNC: 174.3 MG/DL
GLOBULIN SER-MCNC: 3.2 GM/DL (ref 2.4–3.5)
GLUCOSE SERPL-MCNC: 176 MG/DL (ref 82–115)
HBA1C MFR BLD: 7.7 %
HCT VFR BLD AUTO: 40.9 % (ref 40–51)
HGB BLD-MCNC: 13.2 GM/DL (ref 13.5–17.5)
IMM GRANULOCYTES # BLD AUTO: 0.01 10*3/UL
IMM GRANULOCYTES NFR BLD AUTO: 0 %
LYMPHOCYTES # BLD AUTO: 1.1 X10(3)/MCL (ref 0.6–4.6)
LYMPHOCYTES NFR BLD AUTO: 31 %
MCH RBC QN AUTO: 30.5 PG (ref 26–34)
MCHC RBC AUTO-ENTMCNC: 32.3 GM/DL (ref 31–37)
MCV RBC AUTO: 94.5 FL (ref 80–100)
MONOCYTES # BLD AUTO: 0.5 X10(3)/MCL (ref 0.1–1.3)
MONOCYTES NFR BLD AUTO: 14 %
NEUTROPHILS # BLD AUTO: 1.74 X10(3)/MCL (ref 2.1–9.2)
NEUTROPHILS NFR BLD AUTO: 50 %
PLATELET # BLD AUTO: 223 X10(3)/MCL (ref 130–400)
PMV BLD AUTO: 10.6 FL (ref 7.4–10.4)
POTASSIUM SERPL-SCNC: 4.6 MMOL/L (ref 3.5–5.1)
PROT SERPL-MCNC: 7.3 GM/DL (ref 5.8–7.6)
RBC # BLD AUTO: 4.33 X10(6)/MCL (ref 4.5–5.9)
SODIUM SERPL-SCNC: 141 MMOL/L (ref 136–145)
WBC # SPEC AUTO: 3.5 X10(3)/MCL (ref 4.5–11)

## 2021-02-22 ENCOUNTER — HISTORICAL (OUTPATIENT)
Dept: ADMINISTRATIVE | Facility: HOSPITAL | Age: 71
End: 2021-02-22

## 2021-02-22 LAB
RPR SER QL: REACTIVE
T PALLIDUM AB SER QL: REACTIVE

## 2021-03-17 ENCOUNTER — HISTORICAL (OUTPATIENT)
Dept: NEPHROLOGY | Facility: CLINIC | Age: 71
End: 2021-03-17

## 2021-03-17 LAB
ALBUMIN SERPL-MCNC: 3.9 GM/DL (ref 3.4–4.8)
ALBUMIN/GLOB SERPL: 1.2 RATIO (ref 1.1–2)
ALP SERPL-CCNC: 62 UNIT/L (ref 40–150)
ALT SERPL-CCNC: 20 UNIT/L (ref 0–55)
AST SERPL-CCNC: 18 UNIT/L (ref 5–34)
BILIRUB SERPL-MCNC: 0.4 MG/DL
BILIRUBIN DIRECT+TOT PNL SERPL-MCNC: 0.1 MG/DL (ref 0–0.5)
BILIRUBIN DIRECT+TOT PNL SERPL-MCNC: 0.3 MG/DL (ref 0–0.8)
BUN SERPL-MCNC: 23 MG/DL (ref 8.4–25.7)
CALCIUM SERPL-MCNC: 9.7 MG/DL (ref 8.8–10)
CHLORIDE SERPL-SCNC: 102 MMOL/L (ref 98–107)
CO2 SERPL-SCNC: 29 MMOL/L (ref 23–31)
CREAT SERPL-MCNC: 1.29 MG/DL (ref 0.73–1.18)
GLOBULIN SER-MCNC: 3.3 GM/DL (ref 2.4–3.5)
GLUCOSE SERPL-MCNC: 197 MG/DL (ref 82–115)
POTASSIUM SERPL-SCNC: 4.6 MMOL/L (ref 3.5–5.1)
PROT SERPL-MCNC: 7.2 GM/DL (ref 5.8–7.6)
SODIUM SERPL-SCNC: 138 MMOL/L (ref 136–145)
URATE SERPL-MCNC: 7.4 MG/DL (ref 3.5–7.2)

## 2021-06-07 ENCOUNTER — HISTORICAL (OUTPATIENT)
Dept: INTERNAL MEDICINE | Facility: CLINIC | Age: 71
End: 2021-06-07

## 2021-06-07 LAB
ABS NEUT (OLG): 2.42 X10(3)/MCL (ref 2.1–9.2)
ALBUMIN SERPL-MCNC: 4 GM/DL (ref 3.4–4.8)
ALBUMIN/GLOB SERPL: 1.4 RATIO (ref 1.1–2)
ALP SERPL-CCNC: 52 UNIT/L (ref 40–150)
ALT SERPL-CCNC: 16 UNIT/L (ref 0–55)
AST SERPL-CCNC: 16 UNIT/L (ref 5–34)
BASOPHILS # BLD AUTO: 0 X10(3)/MCL (ref 0–0.2)
BASOPHILS NFR BLD AUTO: 1 %
BILIRUB SERPL-MCNC: 0.5 MG/DL
BILIRUBIN DIRECT+TOT PNL SERPL-MCNC: 0.2 MG/DL (ref 0–0.5)
BILIRUBIN DIRECT+TOT PNL SERPL-MCNC: 0.3 MG/DL (ref 0–0.8)
BUN SERPL-MCNC: 17.7 MG/DL (ref 8.4–25.7)
CALCIUM SERPL-MCNC: 9.9 MG/DL (ref 8.8–10)
CHLORIDE SERPL-SCNC: 104 MMOL/L (ref 98–107)
CO2 SERPL-SCNC: 30 MMOL/L (ref 23–31)
CREAT SERPL-MCNC: 1.07 MG/DL (ref 0.73–1.18)
EOSINOPHIL # BLD AUTO: 0.2 X10(3)/MCL (ref 0–0.9)
EOSINOPHIL NFR BLD AUTO: 4 %
ERYTHROCYTE [DISTWIDTH] IN BLOOD BY AUTOMATED COUNT: 13.2 % (ref 11.5–14.5)
EST. AVERAGE GLUCOSE BLD GHB EST-MCNC: 157.1 MG/DL
GLOBULIN SER-MCNC: 2.9 GM/DL (ref 2.4–3.5)
GLUCOSE SERPL-MCNC: 135 MG/DL (ref 82–115)
HBA1C MFR BLD: 7.1 %
HCT VFR BLD AUTO: 39.3 % (ref 40–51)
HGB BLD-MCNC: 12.6 GM/DL (ref 13.5–17.5)
IMM GRANULOCYTES # BLD AUTO: 0.02 10*3/UL
IMM GRANULOCYTES NFR BLD AUTO: 0 %
LYMPHOCYTES # BLD AUTO: 1.2 X10(3)/MCL (ref 0.6–4.6)
LYMPHOCYTES NFR BLD AUTO: 28 %
MCH RBC QN AUTO: 30.3 PG (ref 26–34)
MCHC RBC AUTO-ENTMCNC: 32.1 GM/DL (ref 31–37)
MCV RBC AUTO: 94.5 FL (ref 80–100)
MONOCYTES # BLD AUTO: 0.4 X10(3)/MCL (ref 0.1–1.3)
MONOCYTES NFR BLD AUTO: 10 %
NEUTROPHILS # BLD AUTO: 2.42 X10(3)/MCL (ref 2.1–9.2)
NEUTROPHILS NFR BLD AUTO: 57 %
NRBC BLD AUTO-RTO: 0 % (ref 0–0.2)
PLATELET # BLD AUTO: 232 X10(3)/MCL (ref 130–400)
PMV BLD AUTO: 10.6 FL (ref 7.4–10.4)
POTASSIUM SERPL-SCNC: 4.2 MMOL/L (ref 3.5–5.1)
PROT SERPL-MCNC: 6.9 GM/DL (ref 5.8–7.6)
RBC # BLD AUTO: 4.16 X10(6)/MCL (ref 4.5–5.9)
SODIUM SERPL-SCNC: 142 MMOL/L (ref 136–145)
WBC # SPEC AUTO: 4.2 X10(3)/MCL (ref 4.5–11)

## 2021-06-15 ENCOUNTER — HISTORICAL (OUTPATIENT)
Dept: ADMINISTRATIVE | Facility: HOSPITAL | Age: 71
End: 2021-06-15

## 2021-06-15 LAB
ABS NEUT (OLG): 2.48 X10(3)/MCL (ref 2.1–9.2)
BASOPHILS # BLD AUTO: 0 X10(3)/MCL (ref 0–0.2)
BASOPHILS NFR BLD AUTO: 0 %
EOSINOPHIL # BLD AUTO: 0.1 X10(3)/MCL (ref 0–0.9)
EOSINOPHIL NFR BLD AUTO: 2 %
ERYTHROCYTE [DISTWIDTH] IN BLOOD BY AUTOMATED COUNT: 13.3 % (ref 11.5–14.5)
HCT VFR BLD AUTO: 39.4 % (ref 40–51)
HGB BLD-MCNC: 12.7 GM/DL (ref 13.5–17.5)
IMM GRANULOCYTES # BLD AUTO: 0.01 10*3/UL
IMM GRANULOCYTES NFR BLD AUTO: 0 %
LYMPHOCYTES # BLD AUTO: 1 X10(3)/MCL (ref 0.6–4.6)
LYMPHOCYTES NFR BLD AUTO: 25 %
MCH RBC QN AUTO: 30.8 PG (ref 26–34)
MCHC RBC AUTO-ENTMCNC: 32.2 GM/DL (ref 31–37)
MCV RBC AUTO: 95.6 FL (ref 80–100)
MONOCYTES # BLD AUTO: 0.4 X10(3)/MCL (ref 0.1–1.3)
MONOCYTES NFR BLD AUTO: 10 %
NEUTROPHILS # BLD AUTO: 2.48 X10(3)/MCL (ref 2.1–9.2)
NEUTROPHILS NFR BLD AUTO: 62 %
NRBC BLD AUTO-RTO: 0 % (ref 0–0.2)
PLATELET # BLD AUTO: 212 X10(3)/MCL (ref 130–400)
PMV BLD AUTO: 10.7 FL (ref 7.4–10.4)
RBC # BLD AUTO: 4.12 X10(6)/MCL (ref 4.5–5.9)
RPR SER QL: REACTIVE
T PALLIDUM AB SER QL: REACTIVE
WBC # SPEC AUTO: 4 X10(3)/MCL (ref 4.5–11)

## 2021-06-21 ENCOUNTER — HISTORICAL (OUTPATIENT)
Dept: INTERNAL MEDICINE | Facility: CLINIC | Age: 71
End: 2021-06-21

## 2021-09-27 ENCOUNTER — HISTORICAL (OUTPATIENT)
Dept: INTERNAL MEDICINE | Facility: CLINIC | Age: 71
End: 2021-09-27

## 2021-09-27 LAB
ABS NEUT (OLG): 2.45 X10(3)/MCL (ref 2.1–9.2)
ALBUMIN SERPL-MCNC: 4 GM/DL (ref 3.4–4.8)
ALBUMIN/GLOB SERPL: 1.1 RATIO (ref 1.1–2)
ALP SERPL-CCNC: 61 UNIT/L (ref 40–150)
ALT SERPL-CCNC: 16 UNIT/L (ref 0–55)
AST SERPL-CCNC: 17 UNIT/L (ref 5–34)
BASOPHILS # BLD AUTO: 0 X10(3)/MCL (ref 0–0.2)
BASOPHILS NFR BLD AUTO: 1 %
BILIRUB SERPL-MCNC: 0.5 MG/DL
BILIRUBIN DIRECT+TOT PNL SERPL-MCNC: 0.2 MG/DL (ref 0–0.5)
BILIRUBIN DIRECT+TOT PNL SERPL-MCNC: 0.3 MG/DL (ref 0–0.8)
BUN SERPL-MCNC: 19.6 MG/DL (ref 8.4–25.7)
CALCIUM SERPL-MCNC: 10.7 MG/DL (ref 8.8–10)
CHLORIDE SERPL-SCNC: 104 MMOL/L (ref 98–107)
CHOLEST SERPL-MCNC: 155 MG/DL
CHOLEST/HDLC SERPL: 3 {RATIO} (ref 0–5)
CO2 SERPL-SCNC: 28 MMOL/L (ref 23–31)
CREAT SERPL-MCNC: 1.17 MG/DL (ref 0.73–1.18)
EOSINOPHIL # BLD AUTO: 0.2 X10(3)/MCL (ref 0–0.9)
EOSINOPHIL NFR BLD AUTO: 4 %
ERYTHROCYTE [DISTWIDTH] IN BLOOD BY AUTOMATED COUNT: 13.3 % (ref 11.5–14.5)
EST. AVERAGE GLUCOSE BLD GHB EST-MCNC: 157.1 MG/DL
GLOBULIN SER-MCNC: 3.7 GM/DL (ref 2.4–3.5)
GLUCOSE SERPL-MCNC: 135 MG/DL (ref 82–115)
HBA1C MFR BLD: 7.1 %
HCT VFR BLD AUTO: 40.8 % (ref 40–51)
HDLC SERPL-MCNC: 59 MG/DL (ref 35–60)
HGB BLD-MCNC: 13.3 GM/DL (ref 13.5–17.5)
IMM GRANULOCYTES # BLD AUTO: 0.01 10*3/UL
IMM GRANULOCYTES NFR BLD AUTO: 0 %
LDLC SERPL CALC-MCNC: 85 MG/DL (ref 50–140)
LYMPHOCYTES # BLD AUTO: 1.3 X10(3)/MCL (ref 0.6–4.6)
LYMPHOCYTES NFR BLD AUTO: 29 %
MCH RBC QN AUTO: 31 PG (ref 26–34)
MCHC RBC AUTO-ENTMCNC: 32.6 GM/DL (ref 31–37)
MCV RBC AUTO: 95.1 FL (ref 80–100)
MONOCYTES # BLD AUTO: 0.5 X10(3)/MCL (ref 0.1–1.3)
MONOCYTES NFR BLD AUTO: 10 %
NEUTROPHILS # BLD AUTO: 2.45 X10(3)/MCL (ref 2.1–9.2)
NEUTROPHILS NFR BLD AUTO: 55 %
NRBC BLD AUTO-RTO: 0 % (ref 0–0.2)
PLATELET # BLD AUTO: 239 X10(3)/MCL (ref 130–400)
PMV BLD AUTO: 10.7 FL (ref 7.4–10.4)
POTASSIUM SERPL-SCNC: 4.6 MMOL/L (ref 3.5–5.1)
PROT SERPL-MCNC: 7.7 GM/DL (ref 5.8–7.6)
RBC # BLD AUTO: 4.29 X10(6)/MCL (ref 4.5–5.9)
SODIUM SERPL-SCNC: 142 MMOL/L (ref 136–145)
TRIGL SERPL-MCNC: 54 MG/DL (ref 34–140)
VLDLC SERPL CALC-MCNC: 11 MG/DL
WBC # SPEC AUTO: 4.5 X10(3)/MCL (ref 4.5–11)

## 2021-10-04 ENCOUNTER — HISTORICAL (OUTPATIENT)
Dept: ADMINISTRATIVE | Facility: HOSPITAL | Age: 71
End: 2021-10-04

## 2021-10-04 LAB
BUN SERPL-MCNC: 20 MG/DL (ref 8.4–25.7)
CALCIUM SERPL-MCNC: 10.2 MG/DL (ref 8.8–10)
CHLORIDE SERPL-SCNC: 106 MMOL/L (ref 98–107)
CO2 SERPL-SCNC: 28 MMOL/L (ref 23–31)
CREAT SERPL-MCNC: 1.1 MG/DL (ref 0.73–1.18)
CREAT/UREA NIT SERPL: 18
DEPRECATED CALCIDIOL+CALCIFEROL SERPL-MC: 48.7 NG/ML (ref 30–80)
EST CREAT CLEARANCE SER (OHS): 63.88 ML/MIN
GLUCOSE SERPL-MCNC: 164 MG/DL (ref 82–115)
HAV IGM SERPL QL IA: NONREACTIVE
HBV CORE IGM SERPL QL IA: NONREACTIVE
HBV SURFACE AG SERPL QL IA: NONREACTIVE
HCV AB SERPL QL IA: NONREACTIVE
HIV 1+2 AB+HIV1 P24 AG SERPL QL IA: NONREACTIVE
POTASSIUM SERPL-SCNC: 4.4 MMOL/L (ref 3.5–5.1)
PTH-INTACT SERPL-MCNC: 69.7 PG/ML (ref 8.7–77)
SODIUM SERPL-SCNC: 140 MMOL/L (ref 136–145)

## 2021-11-05 ENCOUNTER — HISTORICAL (OUTPATIENT)
Dept: NEPHROLOGY | Facility: CLINIC | Age: 71
End: 2021-11-05

## 2021-11-05 LAB
ABS NEUT (OLG): 2.6 X10(3)/MCL (ref 2.1–9.2)
ALBUMIN SERPL-MCNC: 4.1 GM/DL (ref 3.4–4.8)
ALBUMIN/GLOB SERPL: 1.3 RATIO (ref 1.1–2)
ALP SERPL-CCNC: 52 UNIT/L (ref 40–150)
ALT SERPL-CCNC: 17 UNIT/L (ref 0–55)
APPEARANCE, UA: CLEAR
AST SERPL-CCNC: 16 UNIT/L (ref 5–34)
BACTERIA #/AREA URNS AUTO: ABNORMAL /HPF
BASOPHILS # BLD AUTO: 0 X10(3)/MCL (ref 0–0.2)
BASOPHILS NFR BLD AUTO: 0 %
BILIRUB SERPL-MCNC: 0.4 MG/DL
BILIRUB UR QL STRIP: NEGATIVE
BILIRUBIN DIRECT+TOT PNL SERPL-MCNC: 0.2 MG/DL (ref 0–0.5)
BILIRUBIN DIRECT+TOT PNL SERPL-MCNC: 0.2 MG/DL (ref 0–0.8)
BUN SERPL-MCNC: 18.3 MG/DL (ref 8.4–25.7)
CALCIUM SERPL-MCNC: 10.1 MG/DL (ref 8.7–10.5)
CHLORIDE SERPL-SCNC: 101 MMOL/L (ref 98–107)
CO2 SERPL-SCNC: 28 MMOL/L (ref 23–31)
COLOR UR: COLORLESS
CREAT SERPL-MCNC: 1.28 MG/DL (ref 0.73–1.18)
CREAT UR-MCNC: 18.4 MG/DL (ref 58–161)
DEPRECATED CALCIDIOL+CALCIFEROL SERPL-MC: 48.9 NG/ML (ref 30–80)
EOSINOPHIL # BLD AUTO: 0.1 X10(3)/MCL (ref 0–0.9)
EOSINOPHIL NFR BLD AUTO: 3 %
ERYTHROCYTE [DISTWIDTH] IN BLOOD BY AUTOMATED COUNT: 13.2 % (ref 11.5–14.5)
GLOBULIN SER-MCNC: 3.1 GM/DL (ref 2.4–3.5)
GLUCOSE (UA): 500 MG/DL
GLUCOSE SERPL-MCNC: 263 MG/DL (ref 82–115)
HCT VFR BLD AUTO: 42 % (ref 40–51)
HGB BLD-MCNC: 13.4 GM/DL (ref 13.5–17.5)
HGB UR QL STRIP: NEGATIVE
HYALINE CASTS #/AREA URNS LPF: ABNORMAL /LPF
IMM GRANULOCYTES # BLD AUTO: 0.01 10*3/UL
IMM GRANULOCYTES NFR BLD AUTO: 0 %
KETONES UR QL STRIP: NEGATIVE
LEUKOCYTE ESTERASE UR QL STRIP: NEGATIVE
LYMPHOCYTES # BLD AUTO: 1.3 X10(3)/MCL (ref 0.6–4.6)
LYMPHOCYTES NFR BLD AUTO: 29 %
MCH RBC QN AUTO: 30.9 PG (ref 26–34)
MCHC RBC AUTO-ENTMCNC: 31.9 GM/DL (ref 31–37)
MCV RBC AUTO: 97 FL (ref 80–100)
MONOCYTES # BLD AUTO: 0.4 X10(3)/MCL (ref 0.1–1.3)
MONOCYTES NFR BLD AUTO: 9 %
MUCOUS THREADS URNS QL MICRO: ABNORMAL
NEUTROPHILS # BLD AUTO: 2.6 X10(3)/MCL (ref 2.1–9.2)
NEUTROPHILS NFR BLD AUTO: 59 %
NITRITE UR QL STRIP: NEGATIVE
NRBC BLD AUTO-RTO: 0 % (ref 0–0.2)
PH UR STRIP: 6 [PH] (ref 4.5–8)
PHOSPHATE SERPL-MCNC: 3.1 MG/DL (ref 2.3–4.7)
PLATELET # BLD AUTO: 231 X10(3)/MCL (ref 130–400)
PMV BLD AUTO: 10.5 FL (ref 7.4–10.4)
POTASSIUM SERPL-SCNC: 4.2 MMOL/L (ref 3.5–5.1)
PROT SERPL-MCNC: 7.2 GM/DL (ref 5.8–7.6)
PROT UR QL STRIP: NEGATIVE
PROT UR STRIP-MCNC: <6.8 MG/DL
PROT/CREAT UR-RTO: <369.6 MG/GM CR
RBC # BLD AUTO: 4.33 X10(6)/MCL (ref 4.5–5.9)
RBC #/AREA URNS AUTO: ABNORMAL /HPF
SODIUM SERPL-SCNC: 137 MMOL/L (ref 136–145)
SP GR UR STRIP: 1 (ref 1–1.03)
SQUAMOUS #/AREA URNS LPF: <1 /LPF
URATE SERPL-MCNC: 6.3 MG/DL (ref 3.5–7.2)
UROBILINOGEN UR STRIP-ACNC: NORMAL
WBC # SPEC AUTO: 4.4 X10(3)/MCL (ref 4.5–11)
WBC #/AREA URNS AUTO: ABNORMAL /HPF

## 2021-11-23 ENCOUNTER — HISTORICAL (OUTPATIENT)
Dept: LAB | Facility: HOSPITAL | Age: 71
End: 2021-11-23

## 2021-11-23 LAB
AST SERPL-CCNC: 16 UNIT/L (ref 5–34)
CHOLEST SERPL-MCNC: 137 MG/DL
CHOLEST/HDLC SERPL: 2 {RATIO} (ref 0–5)
HDLC SERPL-MCNC: 62 MG/DL (ref 35–60)
LDLC SERPL CALC-MCNC: 68 MG/DL (ref 50–140)
TRIGL SERPL-MCNC: 37 MG/DL (ref 34–140)
VLDLC SERPL CALC-MCNC: 7 MG/DL

## 2022-04-10 ENCOUNTER — HISTORICAL (OUTPATIENT)
Dept: ADMINISTRATIVE | Facility: HOSPITAL | Age: 72
End: 2022-04-10
Payer: MEDICARE

## 2022-04-26 VITALS
WEIGHT: 155.44 LBS | BODY MASS INDEX: 23.02 KG/M2 | DIASTOLIC BLOOD PRESSURE: 75 MMHG | HEIGHT: 69 IN | SYSTOLIC BLOOD PRESSURE: 138 MMHG

## 2022-05-02 NOTE — HISTORICAL [facility]
This is a historical note converted from Cele. Formatting and pictures may have been removed.  Please reference Cele for original formatting and attached multimedia. History of Present Illness  70-year-old  male with past medical history significant for diabetes,?hypertension, aortic aneurysm, CKD 2?presents to medicine clinic today for follow up visit/ med refill.?Patient reports compliance with his medication.?  ?   For DM- Patient is currently on metformin 1000 mg twice a day for diabetes.Stopped taking cinnamon since last time. Tried to follow diet. He does not want to add new medication. wants to try changing diet first.  ?   For HTN- lisinopril 20 mg and?Norvasc 5 mg?for hypertension.??He states that his blood pressure is well controlled, runs in? at home.??  ?   For his Aneurysm, he started following a Cardiologist in [place]. Had CT thorax in 10/2020. States during the visit he was told his aneurysm is stable and did not need any surgical intervention.? He is following up with them for yearly CT to monitor the aneurysm. States he will ask them to forward the documents to us- again. States he is supposed to go to [place] this year to follow up in 10/2021. As long its stable no surgical intervention.  Review of Systems  review of systems negative except as stated above.  Physical Exam  General: Alert. Responsive. Not in?acute distress. Afebrile.  HEENT: Normocephalic, Atraumatic, No scleral icterus, Oral mucosa moist.  Neck: No JVD  Pulm: CTAB. No wheezes or crackles. symmetrical chest expansion.  Cardio:?RRR. no appreciable murmurs/gallops.  Abdomen: BS+, soft, non-distended, non-tender  Extremity:? no LE edema. good equal pulses felt bilaterally in all extremities.  Neurologic: alert and oriented x 3. Responds to questions/commands appropriately.  MSK: No obvious deformities. Moving all extremities purposefully.  Skin: Warm, dry and without rashes.  ?   Diabetic foot exam: intact  pulses bilaterally, no calluses, no ulcers, intact sensations bilaterally, intact DTRs.??  Assessment/Plan  Aneurysm in ascending aorta- Stable  4.6 cm on CT thorax in 9/2018  4.7 cm on CT thorax in 10/2019  1/2020, stable per pt, need to get records  echocardiogram 9/2018-EF 60%  advised on good control of BP (systolic goal 110-120)  following a Cardiologist in [place], they recommended to keep current BP regimen. Next visit on 10/2021  Will get RPR to r/o syphilis  ?   HTN  well controlled  cont lisinopril 20 mg daily, Norvasc 5 mg daily  advised to keep a BP log, and to follow?low salt diet  ?   DM- uncontrolled  A1c?6.8 (on 8/2018)-->7 (1/2020)--> 7.5 (9/2020)--> 7.7 (2/2021)  urine microalbumin/creatinine wnl in 2019,, 2020. Currently?on ACEI  on metformin to 1000 mg bid  diabetic eye exam- referred for fundus exam  diabetic foot exam in clinic today- as above  advised on maintaining a cbg log an following diabetic diet and daily exercise  Advised to stop the loose cinnamon powder use for now and will reassess labs next visit  ?   CKD stage 2  TAMMIE 2/2 poor hydration, improving  At baseline poor PO water intake, has been encouraged to drink more every clinic visit  avoid nephrotoxic meds  follows in renal clinic  ?   HFpEF  echo 9/2018-EF 60%  cont aspirin, acei, statin  holding bb due to hx of bradycardia  ?   Normocytic anemia  Iron panel , B12 , folate wnl  Likely 2/2 CKD  ?   Health maintainence  Received Prevnar in 2016  Received Pneumovax in 2017  Received Tdap in 2014  Recieved Zoster on 2/22/21  FOBT in 2018 positive. Colonoscopy done on 2017/2018 (pt cannot recall), no records here, repeat in 5 yrs. FIT 2/2020 negative.  ?  F/U in?4 months with labs  Fundus exam  Records from [place] ?  Referrals  [facility] Internal Referral to Ophthalmology Fundus [facility], Specialty: Ophthalmology, Reason: Diabetic fundus exam, Start: 02/22/21 8:34:00 CST  Clinic Follow up, *Est. 06/22/21 3:00:00 CDT, Order for future  visit, (HFpEF) heart failure with preserved ejection fraction  Ascending aortic aneurysm  CKD (chronic kidney disease)  DM (diabetes mellitus)  HTN (hypertension)  Hyperlipemia  Encounter for vaccinat...   Problem List/Past Medical History  Ongoing  (HFpEF) heart failure with preserved ejection fraction  Ascending aortic aneurysm  CKD (chronic kidney disease)  DM (diabetes mellitus)  HTN (hypertension)  Hyperlipemia  Historical  No qualifying data  Procedure/Surgical History  Denies   Medications  amlodipine 5 mg oral tablet, See Instructions, 3 refills  aspirin 81 mg oral tablet, 81 mg= 1 tab(s), Oral, Daily, 4 refills  atorvastatin 10 mg oral tablet, See Instructions, 3 refills  diabetic supplies, See Instructions  Glucometer, See Instructions  Glucometer Test Strips-Easy Touch Healthpro, See Instructions, 3 refills  kaylan free style, See Instructions, 3 refills  lisinopril 20 mg oral tablet, 20 mg= 1 tab(s), Oral, Daily, 3 refills  metFORMIN 1000 mg oral tablet, See Instructions, 4 refills  Shingrix, 0.5 mL, IM, Once-Unscheduled  Vitamin C 1000 mg oral tablet, 1000 mg= 1 tab(s), Oral, Daily, 3 refills  Vitamin D3 1000 intl units (25 mcg) oral capsule, 1000 IntUnit= 1 cap(s), Oral, Daily, 4 refills  Allergies  No Known Medication Allergies  Social History  Alcohol - No Risk, 08/14/2015  Past, 01/20/2021  Employment/School  Retired, Highest education level: High school., 01/20/2021  Exercise - Occasional exercise, 08/25/2015  Exercise duration: 60. Exercise frequency: Daily. Exercise type: Walking, cut grass., 01/20/2021  Home/Environment  Lives with Spouse. Living situation: Home/Independent. Single family house, 01/20/2021  Nutrition/Health  Low fat, Low sodium, Good, 01/20/2021  Spiritual/Cultural  Latter-day, 01/20/2021  Substance Use - Denies Substance Abuse, 08/14/2015  Never, 01/20/2021  Tobacco - Denies Tobacco Use, 08/14/2015  Never (less than 100 in lifetime), N/A, 01/20/2021  Family History  Diabetes  mellitus type 2: Brother.  Hypertension.: Father.  Immunizations  Vaccine Date Status Comments   zoster vaccine, inactivated 02/22/2021 Given    COVID-19 MRNA, LNP-S, PF- Pfizer 01/31/2021 Given    COVID-19 MRNA, LNP-S, PF- Pfizer 01/10/2021 Given    influenza virus vaccine, inactivated 10/05/2020 Given    influenza virus vaccine, inactivated 10/07/2019 Given    influenza virus vaccine, inactivated 02/28/2018 Given    tetanus/diphtheria/pertussis, acel(Tdap) 08/14/2017 Given    influenza virus vaccine, inactivated 12/21/2016 Given    pneumococcal 13-valent conjugate vaccine 09/07/2016 Given    pneumococcal 23-polyvalent vaccine 10/13/2015 Given    influenza virus vaccine, inactivated 10/13/2015 Given Other : ?POWER OUTAGE   Health Maintenance  Health Maintenance  ???Pending?(in the next year)  ??? ??OverDue  ??? ? ? ?Diabetes Maintenance-Foot Exam due??08/14/18??and every 1??year(s)  ??? ? ? ?HF-Heart Failure Education due??05/28/19??and every 1??year(s)  ??? ? ? ?HF-LVEF due??09/06/19??and every 1??year(s)  ??? ??Due?  ??? ? ? ?Alcohol Misuse Screening due??01/02/21??and every 1??year(s)  ??? ? ? ?Aspirin Therapy for CVD Prevention due??01/28/21??and every 1??year(s)  ??? ? ? ?Colorectal Screening due??02/04/21??and every 1??year(s)  ??? ? ? ?HF-Ejection Fraction Past 13 Months if Hospitalized due??02/22/21??and every 1??year(s)  ??? ? ? ?HF-Fluid Restriction/Low Sodium Diet Education due??02/22/21??and every 1??year(s)  ??? ? ? ?Medicare Annual Wellness Exam due??02/22/21??and every 1??year(s)  ??? ? ? ?Pneumococcal Vaccine due??02/22/21??Unknown Frequency  ??? ? ? ?Zoster Vaccine due??02/22/21??Unknown Frequency  ??? ??Due In Future?  ??? ? ? ?Diabetes Maintenance-Eye Exam not due until??07/29/21??and every 2??year(s)  ??? ? ? ?Influenza Vaccine not due until??10/01/21??and every 1??day(s)  ??? ? ? ?Depression Screening not due until??10/05/21??and every 1??year(s)  ??? ? ? ?Obesity Screening not due  until??01/01/22??and every 1??year(s)  ??? ? ? ?Advance Directive not due until??01/02/22??and every 1??year(s)  ??? ? ? ?Cognitive Screening not due until??01/02/22??and every 1??year(s)  ??? ? ? ?Fall Risk Assessment not due until??01/02/22??and every 1??year(s)  ??? ? ? ?Functional Assessment not due until??01/02/22??and every 1??year(s)  ??? ? ? ?ADL Screening not due until??01/20/22??and every 1??year(s)  ??? ? ? ?HF-ACEI/ARB Prescribed if Clinically Indicated not due until??01/20/22??and every 1??year(s)  ??? ? ? ?Diabetes Maintenance-HgbA1c not due until??02/19/22??and every 1??year(s)  ??? ? ? ?Hypertension Management-BMP not due until??02/19/22??and every 1??year(s)  ???Satisfied?(in the past 1 year)  ??? ??Satisfied?  ??? ? ? ?ADL Screening on??01/20/21.??Satisfied by Toshia Hall  ??? ? ? ?Advance Directive on??02/22/21.??Satisfied by Fabian Gay LPN  ??? ? ? ?Blood Pressure Screening on??02/22/21.??Satisfied by Fabian Gay LPN  ??? ? ? ?Body Mass Index Check on??02/22/21.??Satisfied by Fabian Gay LPN  ??? ? ? ?Cognitive Screening on??02/22/21.??Satisfied by Fabian Gay LPN  ??? ? ? ?Coronary Artery Disease Maintenance-Lipid Lowering Therapy on??01/20/21.??Satisfied by Gracie Rodriguez NP  ??? ? ? ?Depression Screening on??10/05/20.??Satisfied by Es Gonzalez  ??? ? ? ?Diabetes Maintenance-HgbA1c on??02/19/21.??Satisfied by Khalida Oseguera  ??? ? ? ?Diabetes Screening on??02/19/21.??Satisfied by Khalida Oseguera  ??? ? ? ?Fall Risk Assessment on??01/20/21.??Satisfied by Toshia Hall  ??? ? ? ?Functional Assessment on??02/22/21.??Satisfied by Fabian Gay LPN  ??? ? ? ?HF-ACEI/ARB Prescribed if Clinically Indicated on??01/20/21.??Satisfied by Gracie Rodriguez NP  ??? ? ? ?Hypertension Management-Blood Pressure on??02/22/21.??Satisfied by Fabian Gay LPN  ??? ? ? ?Influenza Vaccine on??02/22/21.??Satisfied by Fabian Gay LPN  ??? ? ? ?Lipid Screening  on??09/29/20.??Satisfied by Sandee Parish  ??? ? ? ?Obesity Screening on??02/22/21.??Satisfied by Fabian Gay LPN  ?      Case discussed with Dr. Mittal at time of clinic visit.? Agree with assessment and management plan as discussed/documented.

## 2022-05-02 NOTE — HISTORICAL [facility]
This is a historical note converted from Cele. Formatting and pictures may have been removed.  Please reference Cele for original formatting and attached multimedia. Chief Complaint  f/u, c/o cold symptoms with productive cough (yellowish mucus)  History of Present Illness  57-year-old  male with past medical history significant for diabetes,?hypertension, aortic aneurysm?presents to medicine clinic today for initial visit.? Patient is currently on metformin 1000 mg twice a day for diabetes,?lisinopril 20 mg and?Norvasc 5 mg?for hypertension. ?Patient reports compliance with his medication.? He states that his blood pressure is well controlled. ?Was not able?to get his glucometer?from the pharmacy and?plans to pick it up from the pharmacy today.? Patient reports that since the last couple of weeks hes been experiencing some cough?which has?gotten better, it is sometimes productive?of clear sputum?denies hemoptysis?denies any history of smoking. ?Denies PND,?swelling in legs however reports that he uses 3 pillows at night to sleep.? Diabetic eye exam?was done in 2, 2018.? Denies any other complaints.  Review of Systems  Constitutional : No Fever, No chills, No sweats, No?weakness or?fatigue.  Skin : No jaundice, no rash, no pruritus.  Eye : No recent vision problems, no blurred vision or double vision.  ENMT : No sore throat, no nasal congestion.  Respiratory : No?Shortness of breath, no orthopnea, + cough, no hemoptysis.  Cardiovascular : No chest pain, no palpitations, no syncope, no peripheral edema.  Gastrointestinal : No Abdominal pain, No nausea,?No vomiting, No diarrhea.  Genitourinary? No dysuria, no hematuria, No frequency or urgency.  Endocrine: No polyuria, no polydipsia, no heat or cold intolerance.  Hematologic/lymphatic: No bruising/bleeding tendency.  Musculoskeletal :+noJoint pain, No muscle pain, No claudication.  Neurologic : No headache, no dizziness, No focal weakness, No  tingling or numbness.  Psychiatric : No anxiety, no depression.  Physical Exam  Vitals & Measurements  T:?36.7? ?C (Oral)? HR:?57(Peripheral)? RR:?18? BP:?107/70?  HT:?175?cm? HT:?175?cm? WT:?73.6?kg? WT:?73.6?kg? BMI:?24.03?  General: No acute distress.  Eye: Extraocular movements are intact, Normal conjunctiva  HENT: Normal hearing, Oral mucosa is moist, no pharyngeal erythema  Neck: Supple, Non-tender, No jugular venous distention  Respiratory: mild crackles bilaterally, No W/C/R, Respirations are non-labored, Breath sounds are equal, Symmetrical chest wall expansion.  Cardiovascular: Regular rhythm, S1, S2, No murmur, No gallop, Normal peripheral perfusion,  Gastrointestinal: Soft, Non-tender, bowel sounds present  Musculoskeletal: No tenderness, no swelling  Integumentary: Warm, Moist, No pallor,  Neurologic: Alert, Oriented, gross sensation intact, strength symmetric bilaterally, No gross focal deficits, Cranial Nerves II-XII are grossly intact.  Cognition and Speech: Oriented, Speech clear and coherent, Functional cognition intact.  Psychiatric: Cooperative, Appropriate mood & affect, Non-suicidal  Diabetic foot exam: Intact pulses bilaterally, intact deep tendon reflexes bilaterally, intact sensations?bilaterally, no calluses, no ulcers  Assessment/Plan  Diabetes  Ordered:  Clinic Follow up, *Est. 11/23/18 3:00:00 CST, Order for future visit, Diabetes  HTN (hypertension)  Chronic kidney disease (CKD)  AA (aortic aneurysm), [facility]  Echocardiogram Complete Adult, *Est. 09/06/18 3:00:00 CDT, Routine, Congestive Heart Failure (CHF), *Est. Stop date 09/06/18 3:00:00 CDT, Ambulatory, [facility], Order for future visit, Diabetes  HTN (hypertension)  Chronic kidney disease (CKD)  AA (aortic a...  Hemoglobin A1C [facility], Routine collect, 08/23/18 13:51:00 CDT, Blood, Order for future visit, Stop date 08/23/18 13:51:00 CDT, Lab Collect, Diabetes  HTN (hypertension)  Chronic kidney  disease (CKD)  AA (aortic aneurysm), 08/23/18 13:51:00 CDT  Occult Blood Fecal Immunoassay, Routine collect, Stool, Order for future visit, *Est. 09/06/18 3:00:00 CDT, *Est. Stop date 09/06/18 3:00:00 CDT, Nurse collect, Diabetes  HTN (hypertension)  Chronic kidney disease (CKD)  AA (aortic aneurysm)  XR Chest 2 Views, Routine, 08/23/18 13:49:00 CDT, Cough, None, Ambulatory, Rad Type, Order for future visit, Diabetes  HTN (hypertension)  Chronic kidney disease (CKD)  AA (aortic aneurysm)  (HFpEF) heart failure with preserved ejection fraction, Not Scheduled, 08/...  ?  HTN (hypertension)  Ordered:  Clinic Follow up, *Est. 11/23/18 3:00:00 CST, Order for future visit, Diabetes  HTN (hypertension)  Chronic kidney disease (CKD)  AA (aortic aneurysm), [facility]  Echocardiogram Complete Adult, *Est. 09/06/18 3:00:00 CDT, Routine, Congestive Heart Failure (CHF), *Est. Stop date 09/06/18 3:00:00 CDT, Ambulatory, [facility], Order for future visit, Diabetes  HTN (hypertension)  Chronic kidney disease (CKD)  AA (aortic a...  Hemoglobin A1C [facility], Routine collect, 08/23/18 13:51:00 CDT, Blood, Order for future visit, Stop date 08/23/18 13:51:00 CDT, Lab Collect, Diabetes  HTN (hypertension)  Chronic kidney disease (CKD)  AA (aortic aneurysm), 08/23/18 13:51:00 CDT  Occult Blood Fecal Immunoassay, Routine collect, Stool, Order for future visit, *Est. 09/06/18 3:00:00 CDT, *Est. Stop date 09/06/18 3:00:00 CDT, Nurse collect, Diabetes  HTN (hypertension)  Chronic kidney disease (CKD)  AA (aortic aneurysm)  XR Chest 2 Views, Routine, 08/23/18 13:49:00 CDT, Cough, None, Ambulatory, Rad Type, Order for future visit, Diabetes  HTN (hypertension)  Chronic kidney disease (CKD)  AA (aortic aneurysm)  (HFpEF) heart failure with preserved ejection fraction, Not Scheduled, 08/...  ?  Orders:  amLODIPine, 5 mg = 1 tab(s), Oral, Daily, # 90 tab(s), 4 Refill(s), Pharmacy: [facility]  aspirin, 81 mg = 1 tab(s), Oral, Daily, # 30 tab(s), 4 Refill(s), Pharmacy: [facility]  lisinopril, 20 mg = 1 tab(s), Oral, Daily, # 90 tab(s), 5 Refill(s), Pharmacy: [facility]  metFORMIN, 1,000 mg = 1 tab(s), Oral, BID, # 180 tab(s), 3 Refill(s), Pharmacy: [facility]  [facility] Prescription, Glucometer, See Instructions, Please dispense one glucometer with all necessary equipment to test blood sugar once a day. Diagnosis diabetes type 2, # 1 kit(s), 0 Refill(s), Pharmacy: [facility]  [facility] Prescription, bryson free style, See Instructions, Bryson Free Style Glucose Monitoring System check glucose once daily E11.9, # 1 EA, 3 Refill(s), Pharmacy: [facility] Prescription, diabetic supplies, See Instructions, please dispense 100 lancets, strips to check fbg oncedaily, # 1 supp, 0 Refill(s), Pharmacy: [facility]  ?  1. Aneurysm in ascending aorta  4.6 cm on CT thorax on 5/1/2018  echocardiogram pending  needs good control of BP  ?   2. HTN  /70  cont lisinopril to 20 mg, norvasc 5 mg qday  advised to keep a BP log, low salt diet  ?   3. DM  A1c 7.0 on 5/1/2018, repeat today  on metformin to 1000 mg bid  diabetic eye exam-2/2018  u pr/cr 96.7  diabetic foot exam in clinic today  ?   4. CKD stage 2  ?? avoid nephrotoxic meds,  ?? follows in renal clinic  ?   5. HFpEF  ??? echo 1/2017-EF 57%, DD  ??? will repeat echo  ??? cont aspirin, acei, will prescribe statin, hold bb due to hx of bradycardia  ??? will prescribe lsaix prn  ?   6. Cough  ?? will get XR chest  ?? no fever, no chills, no hemoptysis  ???  ?   7. Health maintainence  Received prevnar in 2016  Received pneumovax in 2017  Received Tdap in 2014  Patient believes he received the zoster vaccine.  orderted fecal immunoassay today  ?   F/U in 3 months.   Problem List/Past Medical History  Ongoing  Diabetes mellitus  DM (diabetes mellitus)  HTN (hypertension)  HTN -  Hypertension  Historical  No qualifying data  Medications  aspirin 81 mg oral tablet, 81 mg= 1 tab(s), Oral, Daily, 4 refills  diabetic supplies, See Instructions  Glucometer, See Instructions  kaylan free style, See Instructions, 3 refills  lisinopril 20 mg oral tablet, 20 mg= 1 tab(s), Oral, Daily, 5 refills  metFORMIN 1000 mg oral tablet, 1000 mg= 1 tab(s), Oral, BID, 3 refills  Norvasc 5 mg oral tablet, 5 mg= 1 tab(s), Oral, Daily, 4 refills  Allergies  No Known Medication Allergies  Social History  Alcohol - No Risk, 08/14/2015  Current, 1-2 times per year, 02/28/2018  Employment/School  Unemployed, 04/13/2016  Exercise - Occasional exercise, 08/25/2015  Self assessment: Good condition., 10/13/2015  Home/Environment  Lives with Spouse. Living situation: Home/Independent. Home equipment: Glucose monitoring. Alcohol abuse in household: No. Substance abuse in household: No. Smoker in household: No. Injuries/Abuse/Neglect in household: No. Feels unsafe at home: No. Safe place to go: Yes. Family/Friends available for support: Yes. Concern for family members at home: No. Major illness in household: No. Financial concerns: No. TV/Computer concerns: No. Risks in environment: Pets/Animal exposure., 10/13/2015  Nutrition/Health  Type of diet: eating healthier since last week dx of diabetis. Diabetic, Wants to lose weight: No. Sleeping concerns: No. Feels highly stressed: No., 08/25/2015  Sexual  Substance Abuse - Denies Substance Abuse, 08/14/2015  Never, 04/13/2016  Tobacco - Denies Tobacco Use, 08/14/2015  Never smoker, 02/28/2018  Immunizations  Vaccine Date Status Comments   influenza virus vaccine, inactivated 02/28/2018 Given    tetanus/diphtheria/pertussis, acel(Tdap) 08/14/2017 Given    influenza virus vaccine, inactivated 12/21/2016 Given    pneumococcal 13-valent conjugate vaccine 09/07/2016 Given    pneumococcal 23-polyvalent vaccine 10/13/2015 Given    influenza virus vaccine, inactivated 10/13/2015 Given  Other : ?POWER OUTAGE   Health Maintenance  Health Maintenance  ???Pending?(in the next year)  ??? ??OverDue  ??? ? ? ?Coronary Artery Disease Maintenance-Antiplatelet Agent Prescribed due??and every?  ??? ? ? ?Pneumococcal Vaccine due??and every?  ??? ? ? ?Diabetes Maintenance-Foot Exam due??08/14/18??and every 1??year(s)  ??? ??Due?  ??? ? ? ?Alcohol Misuse Screening due??08/14/18??and every 1??year(s)  ??? ? ? ?Coronary Artery Disease Maintenance-Lipid Lowering Therapy due??08/23/18??and every?  ??? ? ? ?Diabetes Maintenance-Microalbumin due??08/23/18??Variable frequency  ??? ? ? ?Functional Assessment due??08/23/18??and every 1??year(s)  ??? ? ? ?Hypertension Management-Education due??08/23/18??and every 1??year(s)  ??? ? ? ?Zoster Vaccine due??08/23/18??and every 100??year(s)  ??? ??Due In Future?  ??? ? ? ?Colorectal Screening (Senior Wellness) not due until??08/30/18??and every 1??year(s)  ??? ? ? ?Diabetes Maintenance-Fasting Lipid Profile not due until??02/16/19??and every 1??year(s)  ??? ? ? ?Smoking Cessation not due until??02/16/19??and every 1??year(s)  ??? ? ? ?Diabetes Maintenance-HgbA1c not due until??05/01/19??and every 1??year(s)  ??? ? ? ?Hypertension Management-BMP not due until??05/27/19??and every 1??year(s)  ??? ? ? ?Diabetes Maintenance-Serum Creatinine not due until??05/27/19??and every 1??year(s)  ??? ? ? ?Diabetes Maintenance-Urine Dipstick not due until??05/27/19??and every 1??year(s)  ??? ? ? ?Hypertension Management-Blood Pressure not due until??05/28/19??and every 1??year(s)  ??? ? ? ?Diabetes Maintenance-Eye Exam not due until??06/11/19??and every 1??year(s)  ???Satisfied?(in the past 1 year)  ??? ??Satisfied?  ??? ? ? ?Abdominal Aortic Aneurysm Screening on??08/23/18.??Satisfied by Cory Kovacs  ??? ? ? ?Aspirin Therapy for CVD Prevention on??08/23/18.??Satisfied by Elda Kulkarni MD  ??? ? ? ?Blood Pressure Screening on??08/23/18.??Satisfied by Anahi Nix LPN  ??? ? ?  ?Body Mass Index Check on??08/23/18.??Satisfied by Anahi Nix LPN  ??? ? ? ?Colorectal Screening ([facility]) on??08/30/17.??Satisfied by Marianela Bah  ??? ? ? ?Coronary Artery Disease Maintenance-Antiplatelet Agent Prescribed on??08/23/18.??Satisfied by Elda Kulkarni MD  ??? ? ? ?Depression Screening on??08/23/18.??Satisfied by Anahi Nix LPN  ??? ? ? ?Diabetes Maintenance-HgbA1c on??08/23/18.??Satisfied by Elda Kulkarni MD  ??? ? ? ?Diabetes Screening on??09/28/18.??Satisfied by Gracie Rodriguez NP  ??? ? ? ?Fall Risk Assessment on??08/23/18.??Satisfied by nAahi Nix LPN  ??? ? ? ?Hypertension Management-Blood Pressure on??08/23/18.??Satisfied by Gill Nix LPNe Kwabena  ??? ? ? ?Influenza Vaccine on??02/28/18.??Satisfied by Yumiko Santana RN  ??? ? ? ?Lipid Screening on??02/16/18.??Satisfied by Carolyn Clement  ??? ? ? ?Obesity Screening on??08/23/18.??Satisfied by Anahi Nix LPN  ??? ? ? ?Smoking Cessation on??02/16/18.??Satisfied by Lizzy Barnes RN  ??? ? ? ?Smoking Cessation (Coronary Artery Disease) on??02/16/18.??Satisfied by Lizzy Barnes RN  ??? ? ? ?Smoking Cessation (Diabetes) on??02/16/18.??Satisfied by Lizzy Barnes RN  ?  ?      Will sent script for vit d to pharmacy, will order cbc as well to trend WBC  will also keep in mind the possibility of acei causing the cough   Patient seen and examined,?reviewed with the resident,?agree with?assessment?and?plan.   Definite?persistent inspiratory squeak versus wheeze left lower lobe?vs?maybe even a rub.? Chest x-ray treat with antibiotics and follow-up?within reasonably short period of time.   XR chest reviewed, sending prescription for abx to pharmacy, ordering CT thorax to look at retrocardiac opacity   Patient seen and examined,?reviewed with the resident,?agree with?assessment?and?plan.

## 2022-05-02 NOTE — HISTORICAL [facility] CERNER
This is a historical note converted from Cele. Formatting and pictures may have been removed.  Please reference Cele for original formatting and attached multimedia. Chief Complaint  follow up with medication refills, lab results  History of Present Illness  70-year-old  male with past medical history significant for diabetes,?hypertension, aortic aneurysm, CKD 2?presents to medicine clinic today for follow up visit/ med refill.?Patient reports compliance with his medication.?  ?   For DM- Patient is currently on metformin 1000 mg twice a day for diabetes.?-140s. PPG in 115-120s.  ?   For HTN-?He states that his blood pressure is well controlled, runs in?115-130s at home.??Did not take any of his meds today.  ?   For his Aneurysm, he?is following a Cardiologist in [place]. Had CT thorax in 10/2020. States during the visit he was told his aneurysm is stable and did not need any surgical intervention.? He is following up with them for yearly CT to monitor the aneurysm. States he is supposed to go to [place] this year to follow up?this month in?10/2021. Repeat CT with them.  ?  Review of Systems  review of systems negative except as stated above.  Physical Exam  Vitals & Measurements  T:?36.6? ?C (Oral)? HR:?68(Peripheral)? RR:?18? BP:?153/77?  HT:?177.00?cm? WT:?72.800?kg? BMI:?23.24?  General: Alert. Responsive. Not in?acute distress.  HEENT: Normocephalic, Atraumatic, No scleral icterus.  Neck: No JVD or carotid bruits.  Pulm: CTAB. No wheezes or crackles. symmetrical chest expansion.  Cardio:?RRR. no appreciable murmurs/gallops.  Abdomen: BS+, soft, non-distended, non-tender  Extremity:? no LE edema. good equal pulses felt bilaterally in all extremities.  Neurologic: alert and oriented x 3. Responds to questions/commands appropriately.  MSK: No obvious deformities. Moving all extremities purposefully.  Skin: Warm, dry and without rashes.  ?  Diabetic foot exam: intact pulses  bilaterally, no calluses, no ulcers, intact sensations bilaterally, intact DTRs. Normal monofilament test.?  Assessment/Plan  Aneurysm in ascending aorta- Stable  Possibly tertiary syphilis manifestation  4.6 cm on CT thorax in 9/2018  4.7 cm on CT thorax in 10/2019  10/2020 CT done in [place], stable per pt, need to get records. will consult CM.  echocardiogram 9/2018-EF 60%  advised on good control of BP (systolic goal 110-120)  following a Cardiologist in [place]. Next visit this month, 10/2021 with repeat CT.  Will get records  ?   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   HTN  well controlled at home 120-130s, but needs better control d/t aneurysm possibly 110-120s  cont Norvasc?to 10?mg daily. Will increase Lisinopril to 40mg daily  advised to keep a BP log, and to follow?low salt diet  Nurse visit in 2 weeks for BP check  ?   DM- improving  A1c?6.8 (on 8/2018)-->7 (1/2020)--> 7.5 (9/2020)--> 7.7 (2/2021)--> 7.1 (6/2021)  urine microalbumin/creatinine wnl in 2019,, 2020. Currently?on ACEI  Continue on metformin to 1000 mg bid. Adding Tradjenta 5mg daily.  diabetic eye exam- utd per 3/2021  diabetic foot exam in clinic today- as above  advised on maintaining a cbg log an following diabetic diet and daily exercise  Advised?to stop binge eating and drinking at night  ?   CKD stage 2  TAMMIE 2/2 poor hydration, - resolved  At baseline poor PO water intake, has been encouraged to drink more every clinic visit  avoid nephrotoxic meds  follows in renal clinic  ?   HFpEF  echo 9/2018-EF 60%  cont aspirin, acei, statin  holding bb due to hx of bradycardia  ?  Chronic mild leukopenia- resolved  Will get HIV, hep panel  ?  Normocytic anemia  Iron panel , B12 , folate wnl  Likely 2/2 CKD  ?  Hypercalcemia  ?Will repeat BMP, get vit D and PTH  ?  Health maintainence  Received  Prevnar in 2016  Received Pneumovax in 2017  Received Tdap in 2014  Received Zoster on 2/22/21 and 6/15/21  Flu shot today  COVIS x2 doses done, booster today  FOBT in 2018 positive. Colonoscopy done on 2017/2018 (pt cannot recall), no records here, repeat in 5 yrs. FIT 2/2020 negative. 6/2021 neg  Never smoked  ?  f/u in?4 months  labs- bmp, vit d, pth, hiv, hep panel  Nurse visit in 2 weeks  Wellness visit  ?  Attending Physician Addendum  Management and plan discussed with resident?at time of clinic visit. Care was reasonable and necessary. Routine follow up.?  Referrals  Clinic Follow up, *Est. 02/04/22 3:00:00 CST, Order for future visit, Ascending aortic aneurysm  DM (diabetes mellitus)  HTN (hypertension)  CKD (chronic kidney disease)  Normocytic anemia  Tertiary syphilis  (HFpEF) heart failure with preserved ejection fraction...  Clinic Follow up, *Est. 10/04/21 3:00:00 CDT, Medicare Wellness Visit, Please schedule for Saturday wellness clinic - first available, Order for future visit, Healthcare maintenance  DM (diabetes mellitus)  HTN (hypertension)  CKD (chronic kidney disease)  Normocyt...  Clinic Follow-Up Nurse Visit, *Est. 10/18/21 3:00:00 CDT, BP check, Order for future visit, HTN (hypertension), [facility]   Problem List/Past Medical History  Ongoing  (HFpEF) heart failure with preserved ejection fraction  Ascending aortic aneurysm  CKD (chronic kidney disease)  DM (diabetes mellitus)  HLD (hyperlipidemia)  HTN (hypertension)  Normocytic anemia  Tertiary syphilis  Historical  No qualifying data  Procedure/Surgical History  Denies   Medications  amlodipine 10 mg oral tablet, 10 mg= 1 tab(s), Oral, Daily, 3 refills  aspirin 81 mg oral tablet, 81 mg= 1 tab(s), Oral, Daily, 4 refills  atorvastatin 10 mg oral tablet, 10 mg= 1 tab(s), Oral, Daily, 3 refills  diabetic supplies, See Instructions  Glucometer, See Instructions  Glucometer Test Strips-Easy Touch Healthpro, See  Instructions, 3 refills  kaylan free style, See Instructions, 3 refills  lisinopril 40 mg oral tablet, 40 mg= 1 tab(s), Oral, Daily, 4 refills  metFORMIN 1000 mg oral tablet, See Instructions, 4 refills  Tradjenta 5 mg oral tablet, 5 mg= 1 tab(s), Oral, Daily, 4 refills  Vitamin C 1000 mg oral tablet, 1000 mg= 1 tab(s), Oral, Daily, 4 refills  Vitamin D3 1000 intl units (25 mcg) oral capsule, 1000 IntUnit= 1 cap(s), Oral, Daily, 4 refills  Allergies  No Known Medication Allergies  Social History  Abuse/Neglect  No, No, Yes, 03/25/2021  Alcohol - No Risk, 08/14/2015  Past, 03/25/2021  Employment/School  Retired, Highest education level: High school., 01/20/2021  Exercise - Occasional exercise, 08/25/2015  Exercise duration: 60. Exercise frequency: Daily. Exercise type: Walking, cut grass., 01/20/2021  Financial/Legal Situation  Low income, Social Security, 03/25/2021  Home/Environment  Lives with Spouse. Living situation: Home/Independent. Single family house, 01/20/2021    Never in , 03/25/2021  Nutrition/Health  Low fat, Low sodium, Good, 01/20/2021  Sexual  Sexually active: Yes. Number of current partners 1. Sexual orientation: Straight or heterosexual. Gender Identity Identifies as male., 03/25/2021  Spiritual/Cultural  Sikhism, 01/20/2021  Substance Use - Denies Substance Abuse, 08/14/2015  Never, 01/20/2021  Tobacco - Denies Tobacco Use, 08/14/2015  Never (less than 100 in lifetime), N/A, 03/25/2021  Family History  Diabetes mellitus type 2: Brother.  Hypertension.: Father.  Immunizations  Vaccine Date Status Comments   zoster vaccine, inactivated 06/15/2021 Given    zoster vaccine, inactivated 02/22/2021 Given    COVID-19 MRNA, LNP-S, PF- Pfizer 01/31/2021 Given    COVID-19 MRNA, LNP-S, PF- Pfizer 01/10/2021 Given    influenza virus vaccine, inactivated 10/05/2020 Given    influenza virus vaccine, inactivated 10/07/2019 Given    influenza virus vaccine, inactivated 02/28/2018 Given     tetanus/diphtheria/pertussis, acel(Tdap) 08/14/2017 Given    influenza virus vaccine, inactivated 12/21/2016 Given    pneumococcal 13-valent conjugate vaccine 09/07/2016 Given    pneumococcal 23-polyvalent vaccine 10/13/2015 Given    influenza virus vaccine, inactivated 10/13/2015 Given Other : ?POWER OUTAGE   Health Maintenance  Health Maintenance  ???Pending?(in the next year)  ??? ??OverDue  ??? ? ? ?Diabetes Maintenance-Foot Exam due??08/14/18??and every 1??year(s)  ??? ? ? ?HF-Heart Failure Education due??05/28/19??and every 1??year(s)  ??? ? ? ?HF-LVEF due??09/06/19??and every 1??year(s)  ??? ??Due?  ??? ? ? ?HF-Ejection Fraction Past 13 Months if Hospitalized due??10/04/21??and every 1??year(s)  ??? ? ? ?HF-Fluid Restriction/Low Sodium Diet Education due??10/04/21??and every 1??year(s)  ??? ? ? ?Medicare Annual Wellness Exam due??10/04/21??and every 1??year(s)  ??? ? ? ?Pneumococcal Vaccine due??10/04/21??Unknown Frequency  ??? ??Due In Future?  ??? ? ? ?Obesity Screening not due until??01/01/22??and every 1??year(s)  ??? ? ? ?Advance Directive not due until??01/02/22??and every 1??year(s)  ??? ? ? ?Alcohol Misuse Screening not due until??01/02/22??and every 1??year(s)  ??? ? ? ?Cognitive Screening not due until??01/02/22??and every 1??year(s)  ??? ? ? ?Fall Risk Assessment not due until??01/02/22??and every 1??year(s)  ??? ? ? ?Functional Assessment not due until??01/02/22??and every 1??year(s)  ??? ? ? ?Depression Screening not due until??03/25/22??and every 1??year(s)  ??? ? ? ?Diabetes Maintenance-Eye Exam not due until??03/25/22??and every 1??year(s)  ??? ? ? ?ADL Screening not due until??03/25/22??and every 1??year(s)  ??? ? ? ?Aspirin Therapy for CVD Prevention not due until??06/15/22??and every 1??year(s)  ??? ? ? ?Colorectal Screening not due until??06/18/22??and every 1??year(s)  ??? ? ? ?Diabetes Maintenance-HgbA1c not due until??09/27/22??and every 1??year(s)  ??? ? ? ?Hypertension  Management-BMP not due until??09/27/22??and every 1??year(s)  ???Satisfied?(in the past 1 year)  ??? ??Satisfied?  ??? ? ? ?ADL Screening on??03/25/21.??Satisfied by Toshia Hall  ??? ? ? ?Advance Directive on??02/22/21.??Satisfied by Fabian Gay LPN  ??? ? ? ?Alcohol Misuse Screening on??06/15/21.??Satisfied by Carlos Gutiérrez MD  ??? ? ? ?Aspirin Therapy for CVD Prevention on??06/15/21.??Satisfied by Carlos Gutiérrez MD  ??? ? ? ?Blood Pressure Screening on??10/04/21.??Satisfied by Fabian Gay LPN  ??? ? ? ?Body Mass Index Check on??10/04/21.??Satisfied by Fabian Gay LPN  ??? ? ? ?Cognitive Screening on??02/22/21.??Satisfied by Fabian Gay LPN  ??? ? ? ?Colorectal Screening on??06/18/21.??Satisfied by Khalida Oseguera  ??? ? ? ?Coronary Artery Disease Maintenance-Lipid Lowering Therapy on??10/04/21.??Satisfied by Amber Mittal MD  ??? ? ? ?Depression Screening on??03/25/21.??Satisfied by Toshia Hall  ??? ? ? ?Diabetes Maintenance-HgbA1c on??09/27/21.??Satisfied by Damian Downey.  ??? ? ? ?Diabetes Screening on??09/27/21.??Satisfied by Damian Downey.  ??? ? ? ?Fall Risk Assessment on??06/15/21.??Satisfied by Fabian Gay LPN  ??? ? ? ?Functional Assessment on??02/22/21.??Satisfied by Fabian Gay LPN  ??? ? ? ?HF-ACEI/ARB Prescribed if Clinically Indicated on??10/04/21.??Satisfied by Amber Mittal MD  ??? ? ? ?Hypertension Management-Blood Pressure on??10/04/21.??Satisfied by Fabian Gay LPN  ??? ? ? ?Influenza Vaccine on??10/04/21.??Satisfied by Amber Mittal MD  ??? ? ? ?Lipid Screening on??09/27/21.??Satisfied by Damian Downey.  ??? ? ? ?Obesity Screening on??10/04/21.??Satisfied by Fabian Gay LPN  ??? ? ? ?Zoster Vaccine on??06/15/21.??Satisfied by Fabian Gay LPN  ?

## 2022-05-02 NOTE — HISTORICAL [facility]ERNER
This is a historical note converted from Cele. Formatting and pictures may have been removed.  Please reference Cele for original formatting and attached multimedia. Chief Complaint  follow up  History of Present Illness  70-year-old  male with past medical history significant for diabetes,?hypertension, aortic aneurysm, CKD 2?presents to medicine clinic today for follow up visit/ med refill.?Patient reports compliance with his medication.?  ?   For DM- Patient is currently on metformin 1000 mg twice a day for diabetes.?FBG in 130s, sometimes 140s, binge eats/drinks at night, chocolate nuts/dried fruits. Also drinks juice at night. Afternoons in 110s.  ?   For HTN- lisinopril 20 mg and?Norvasc 5 mg?for hypertension.??He states that his blood pressure is well controlled, runs in?130s at home.??  ?   For his Aneurysm, he?is following a Cardiologist in [facility]. Had CT thorax in 10/2020. States during the visit he was told his aneurysm is stable and did not need any surgical intervention.? He is following up with them for yearly CT to monitor the aneurysm. States he is supposed to go to [facility] this year to follow up in 10/2021. Repeat CT with them.  Review of Systems  review of systems negative except as stated above.  Physical Exam  Vitals & Measurements  T:?37.2? ?C (Oral)? HR:?58(Peripheral)? RR:?18? BP:?147/83?  HT:?177.00?cm? WT:?71.700?kg? BMI:?22.89?  General: Alert. Responsive. Not in?acute distress. Afebrile.  HEENT: Normocephalic, Atraumatic, No scleral icterus, Oral mucosa moist.  Neck: No JVD  Pulm: CTAB. No wheezes or crackles. symmetrical chest expansion.  Cardio:?RRR. no appreciable murmurs/gallops.  Abdomen: BS+, soft, non-distended, non-tender  Extremity:? no LE edema. good equal pulses felt bilaterally in all extremities.  Neurologic: alert and oriented x 3. Responds to questions/commands appropriately.  MSK: No obvious deformities. Moving all extremities  purposefully.  Skin: Warm, dry and without rashes.  ?   Diabetic foot exam: intact pulses bilaterally, no calluses, no ulcers, intact sensations bilaterally, intact DTRs.??  Assessment/Plan  Aneurysm in ascending aorta- Stable  Possibly tertiary syphilis manifestation  4.6 cm on CT thorax in 9/2018  4.7 cm on CT thorax in 10/2019  10/2020 CT done in [facility], stable per pt, need to get records. will consult CM.  echocardiogram 9/2018-EF 60%  advised on good control of BP (systolic goal 110-120)  following a Cardiologist in [facility]. Next visit on 10/2021 with repeat CT.  ?   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   HTN  well controlled at home 120-130s, but needs better control d/t aneurysm possibly 110-120s  cont lisinopril 20 mg daily. Will increase Norvasc?to 10?mg daily  advised to keep a BP log, and to follow?low salt diet  ?   DM- improving  A1c?6.8 (on 8/2018)-->7 (1/2020)--> 7.5 (9/2020)--> 7.7 (2/2021)--> 7.1 (6/2021)  urine microalbumin/creatinine wnl in 2019,, 2020. Currently?on ACEI  Continue on metformin to 1000 mg bid  diabetic eye exam- referred for fundus exam  diabetic foot exam in clinic today- as above  advised on maintaining a cbg log an following diabetic diet and daily exercise  Advised?to stop binge eating and drinking at night  ?   CKD stage 2  TAMMIE 2/2 poor hydration, - resolved  At baseline poor PO water intake, has been encouraged to drink more every clinic visit  avoid nephrotoxic meds  follows in renal clinic  ?   HFpEF  echo 9/2018-EF 60%  cont aspirin, acei, statin  holding bb due to hx of bradycardia  ?   Chronic mild leukopenia  Will get PBS  ?  Normocytic anemia  Iron panel , B12 , folate wnl  Likely 2/2 CKD  ?  Health maintainence  Received Prevnar in 2016  Received Pneumovax in 2017  Received Tdap in 2014  Received Zoster on 2/22/21 and  6/15/21  FOBT in 2018 positive. Colonoscopy done on 2017/2018 (pt cannot recall), no records here, repeat in 5 yrs. FIT 2/2020 negative. Repeat FIT ordered.  Patient seen and examined,?reviewed with the resident,?agree with?assessment?and?plan.  ?  F/U in?4 months with labs  FIT  Records from [place] ?  Labs today - RPR and PBS  Referrals  Clinic Follow up, *Est. 10/15/21 3:00:00 CDT, Order for future visit, HTN (hypertension)  Ascending aortic aneurysm  (HFpEF) heart failure with preserved ejection fraction  CKD (chronic kidney disease)  DM (diabetes mellitus)  Hyperlipemia  Normocytic anemia  Te...  Clinic Follow up, *Est. 06/15/21 3:00:00 CDT, Medicare Wellness Visit, Please shcedule Saturday wellness clinic - first available, Order for future visit, HTN (hypertension)  Ascending aortic aneurysm  CKD (chronic kidney disease)  (HFpEF) heart failure with preserv...   Problem List/Past Medical History  Ongoing  (HFpEF) heart failure with preserved ejection fraction  Ascending aortic aneurysm  CKD (chronic kidney disease)  DM (diabetes mellitus)  HTN (hypertension)  Hyperlipemia  Normocytic anemia  Tertiary syphilis  Historical  No qualifying data  Procedure/Surgical History  Denies   Medications  amlodipine 10 mg oral tablet, 10 mg= 1 tab(s), Oral, Daily, 3 refills  aspirin 81 mg oral tablet, 81 mg= 1 tab(s), Oral, Daily, 4 refills  atorvastatin 10 mg oral tablet, See Instructions, 3 refills  diabetic supplies, See Instructions  Glucometer, See Instructions  Glucometer Test Strips-Easy Touch Healthpro, See Instructions, 3 refills  kaylan free style, See Instructions, 3 refills  lisinopril 20 mg oral tablet, 20 mg= 1 tab(s), Oral, Daily, 3 refills  metFORMIN 1000 mg oral tablet, See Instructions, 4 refills  Vitamin C 1000 mg oral tablet, 1000 mg= 1 tab(s), Oral, Daily, 3 refills  Vitamin D3 1000 intl units (25 mcg) oral capsule, 1000 IntUnit= 1 cap(s), Oral, Daily, 4 refills  Allergies  No Known  Medication Allergies  Social History  Abuse/Neglect  No, No, Yes, 03/25/2021  Alcohol - No Risk, 08/14/2015  Past, 03/25/2021  Employment/School  Retired, Highest education level: High school., 01/20/2021  Exercise - Occasional exercise, 08/25/2015  Exercise duration: 60. Exercise frequency: Daily. Exercise type: Walking, cut grass., 01/20/2021  Financial/Legal Situation  Low income, Social Security, 03/25/2021  Home/Environment  Lives with Spouse. Living situation: Home/Independent. Single family house, 01/20/2021    Never in , 03/25/2021  Nutrition/Health  Low fat, Low sodium, Good, 01/20/2021  Sexual  Sexually active: Yes. Number of current partners 1. Sexual orientation: Straight or heterosexual. Gender Identity Identifies as male., 03/25/2021  Spiritual/Cultural  Lutheran, 01/20/2021  Substance Use - Denies Substance Abuse, 08/14/2015  Never, 01/20/2021  Tobacco - Denies Tobacco Use, 08/14/2015  Never (less than 100 in lifetime), N/A, 03/25/2021  Family History  Diabetes mellitus type 2: Brother.  Hypertension.: Father.  Immunizations  Vaccine Date Status Comments   zoster vaccine, inactivated 02/22/2021 Given    COVID-19 MRNA, LNP-S, PF- Pfizer 01/31/2021 Given    COVID-19 MRNA, LNP-S, PF- Pfizer 01/10/2021 Given    influenza virus vaccine, inactivated 10/05/2020 Given    influenza virus vaccine, inactivated 10/07/2019 Given    influenza virus vaccine, inactivated 02/28/2018 Given    tetanus/diphtheria/pertussis, acel(Tdap) 08/14/2017 Given    influenza virus vaccine, inactivated 12/21/2016 Given    pneumococcal 13-valent conjugate vaccine 09/07/2016 Given    pneumococcal 23-polyvalent vaccine 10/13/2015 Given    influenza virus vaccine, inactivated 10/13/2015 Given Other : ?POWER OUTAGE   Health Maintenance  Health Maintenance  ???Pending?(in the next year)  ??? ??OverDue  ??? ? ? ?Diabetes Maintenance-Foot Exam due??08/14/18??and every 1??year(s)  ??? ? ? ?HF-Heart Failure Education  due??05/28/19??and every 1??year(s)  ??? ? ? ?HF-LVEF due??09/06/19??and every 1??year(s)  ??? ? ? ?Alcohol Misuse Screening due??01/02/21??and every 1??year(s)  ??? ? ? ?Aspirin Therapy for CVD Prevention due??01/28/21??and every 1??year(s)  ??? ? ? ?Colorectal Screening due??02/04/21??and every 1??year(s)  ??? ??Due?  ??? ? ? ?HF-Ejection Fraction Past 13 Months if Hospitalized due??06/15/21??and every 1??year(s)  ??? ? ? ?HF-Fluid Restriction/Low Sodium Diet Education due??06/15/21??and every 1??year(s)  ??? ? ? ?Medicare Annual Wellness Exam due??06/15/21??and every 1??year(s)  ??? ? ? ?Pneumococcal Vaccine due??06/15/21??Unknown Frequency  ??? ? ? ?Zoster Vaccine due??06/15/21??Unknown Frequency  ??? ??Due In Future?  ??? ? ? ?Influenza Vaccine not due until??10/01/21??and every 1??day(s)  ??? ? ? ?Obesity Screening not due until??01/01/22??and every 1??year(s)  ??? ? ? ?Advance Directive not due until??01/02/22??and every 1??year(s)  ??? ? ? ?Cognitive Screening not due until??01/02/22??and every 1??year(s)  ??? ? ? ?Fall Risk Assessment not due until??01/02/22??and every 1??year(s)  ??? ? ? ?Functional Assessment not due until??01/02/22??and every 1??year(s)  ??? ? ? ?HF-ACEI/ARB Prescribed if Clinically Indicated not due until??02/22/22??and every 1??year(s)  ??? ? ? ?Depression Screening not due until??03/25/22??and every 1??year(s)  ??? ? ? ?ADL Screening not due until??03/25/22??and every 1??year(s)  ??? ? ? ?Diabetes Maintenance-Eye Exam not due until??03/28/22??and every 1??year(s)  ??? ? ? ?Diabetes Maintenance-HgbA1c not due until??06/07/22??and every 1??year(s)  ??? ? ? ?Hypertension Management-BMP not due until??06/07/22??and every 1??year(s)  ???Satisfied?(in the past 1 year)  ??? ??Satisfied?  ??? ? ? ?ADL Screening on??03/25/21.??Satisfied by Toshia Hall  ??? ? ? ?Advance Directive on??02/22/21.??Satisfied by Fabian Gay LPN  ??? ? ? ?Blood Pressure Screening on??06/15/21.??Satisfied by  Fabian Gay LPN  ??? ? ? ?Body Mass Index Check on??06/15/21.??Satisfied by Fabian Gay LPN  ??? ? ? ?Cognitive Screening on??02/22/21.??Satisfied by Fabian Gay LPN  ??? ? ? ?Coronary Artery Disease Maintenance-Lipid Lowering Therapy on??02/22/21.??Satisfied by Amber Mittal MD  ??? ? ? ?Depression Screening on??03/25/21.??Satisfied by Toshia Hall  ??? ? ? ?Diabetes Maintenance-HgbA1c on??06/07/21.??Satisfied by Khalida Oseguera  ??? ? ? ?Diabetes Screening on??06/07/21.??Satisfied by Khalida Oseguera  ??? ? ? ?Fall Risk Assessment on??06/15/21.??Satisfied by Fabian Gay LPN  ??? ? ? ?Functional Assessment on??02/22/21.??Satisfied by Fabian Gay LPN  ??? ? ? ?HF-ACEI/ARB Prescribed if Clinically Indicated on??02/22/21.??Satisfied by Amber Mittal MD  ??? ? ? ?Hypertension Management-Blood Pressure on??06/15/21.??Satisfied by Fabian Gay LPN  ??? ? ? ?Influenza Vaccine on??02/22/21.??Satisfied by Fabian Gay LPN  ??? ? ? ?Lipid Screening on??09/29/20.??Satisfied by Sandee Parish  ??? ? ? ?Obesity Screening on??06/15/21.??Satisfied by Fabian Gay LPN  ??? ? ? ?Zoster Vaccine on??02/22/21.??Satisfied by Fabian Gay LPN  ?

## 2022-05-02 NOTE — HISTORICAL [facility]ER
This is a historical note converted from Cele. Formatting and pictures may have been removed.  Please reference Cele for original formatting and attached multimedia. Chief Complaint  F/U, refills  History of Present Illness  68-year-old  male with past medical history significant for diabetes,?hypertension, aortic aneurysm?presents to medicine clinic today for follow up visit/ med refill.? Patient is currently on metformin 1000 mg twice a day for diabetes,?lisinopril 20 mg and?Norvasc 5 mg?for hypertension. ?Patient reports compliance with his medication.? He states that his blood pressure is well controlled, runs in 110s/130s at home.??States he does not measure his BG at home anymore.??Diabetic eye exam?- follows ophthalmology for it.?Fecal immunoassay?was pos, pt was referred for colonoscopy which he had, he was told that 3 polyps were found that were removed, path was benign, he was told to get a repeat colonoscopy in 5 years from 2017. ?Patient states he is not sure if the colonoscopy was in 2017 or 2018 after the FOBT was?2018 was positive. Denies any other complaints. Denies chest pain, SOB, palpitations, HYMAN,?wheezing, LE edema. Denies fever/chills/night sweats. Denies nausea, vomiting, abdominal pain. Denies sx of peripheral neuropathy.  ?   For his Aneurysm, he started following a Cardiologist in [facility]. States during the visit he was told his aneurysm is stable and did not need any surgical intervention. His BP is good with current regimen. He is following up with them for yearly CT to monitor the aneurysm. States he will ask them to forward the documents to us.  ?  Review of Systems  review of systems negative except as stated above.  Physical Exam  Vitals & Measurements  T:?36.7? ?C (Oral)? HR:?53(Peripheral)? RR:?20? BP:?157/88?  HT:?175?cm? WT:?75.5?kg? BMI:?24.65?  General: Alert. Responsive. Not in?acute distress. Afebrile.  HEENT: Normocephalic, Atraumatic, No scleral  icterus, Oral mucosa moist.  Neck: No JVD  Pulm: CTAB. No wheezes or crackles. symmetrical chest expansion.  Cardio:?RRR. no appreciable murmurs/gallops.  Abdomen: BS+, soft, non-distended, non-tender  Extremity:? no LE edema. good equal pulses felt bilaterally in all extremities.  Neurologic: alert and oriented x 3. Responds to questions/commands appropriately.  MSK: No obvious deformities. Moving all extremities purposefully.  Skin: Warm, dry and without rashes.  ?   Diabetic foot exam: intact pulses bilaterally, no calluses, no ulcers, intact sensations bilaterally, intact DTRs. Normal monofilament test.?  ?  Assessment/Plan  Aneurysm in ascending aorta- Stable  4.6 cm on CT thorax in 9/2018  4.7 cm on CT thorax in 10/2019  echocardiogram 9/2018-EF 60%  advised on good control of BP (systolic goal 110-120)  following a Cardiologist in [facility], they recommended to keep current BP regimen. I asked him to fax health records from them.  ?   HTN  BP elevated today  At home SBP runs in 110-130  cont lisinopril to 20 mg, Norvasc 5 mg qday  advised to keep a BP log, low salt diet  ?   DM  A1c?6.8 on 8/1/2018, repeat today  urine microalbumin/creatinine wnl in 2019, on ACEI  on metformin to 1000 mg bid  diabetic eye exam-2/2018, follows optho  diabetic foot exam in clinic today-normal  advised on maintaining a cbg log  ?   CKD stage 2  avoid nephrotoxic meds,  follows in renal clinic  ?   HFpEF  echo 9/2018-EF 60%  will repeat echo  cont aspirin, acei, statin,  holding bb due to hx of bradycardia  ?   Normocytic anemia  Iron panel , B12 , folate wnl  ?   Health maintainence  Received Prevnar in 2016  Received Pneumovax in 2017  Received Tdap in 2014  Patient believes he received the zoster vaccine.  Flu shot 10/19  FOBT in 2018 positive. Colonoscopy done on 2017/2018 (pt cannot recall), no records here, repeat in 5 yrs. Doing FOBT today  ?  ?  F/U in?4 months.  Referrals  Clinic Follow up, *Est. 05/28/20 3:00:00  CDT, Order for future visit, (HFpEF) heart failure with preserved ejection fraction  HTN (hypertension)  Hyperlipemia  DM (diabetes mellitus)  Ascending aortic aneurysm  CKD (chronic kidney disease), [facility]   Problem List/Past Medical History  Ongoing  (HFpEF) heart failure with preserved ejection fraction  DM (diabetes mellitus)  HTN (hypertension)  Hyperlipemia  Historical  No qualifying data  Procedure/Surgical History  Denies   Medications  amlodipine 5 mg oral tablet, See Instructions, 3 refills  aspirin 81 mg oral Delayed Release (EC) tablet, 81 mg= 1 tab(s), Oral, Daily  aspirin 81 mg oral tablet, 81 mg= 1 tab(s), Oral, Daily, 4 refills  atorvastatin 10 mg oral tablet, See Instructions, 4 refills  diabetic supplies, See Instructions  Glucometer, See Instructions  Glucometer Test Strips-Easy Touch Healthpro, See Instructions, 3 refills  kaylan free style, See Instructions, 3 refills  lisinopril 20 mg oral tablet, See Instructions, 4 refills  metFORMIN 1000 mg oral tablet, See Instructions  Vitamin C 1000 mg oral tablet, 1000 mg= 1 tab(s), Oral, Daily, 3 refills  Allergies  No Known Medication Allergies  Social History  Abuse/Neglect  No, No, Yes, 01/28/2020  Alcohol - No Risk, 08/14/2015  Past, 02/27/2019  Employment/School  Unemployed, 04/13/2016  Exercise - Occasional exercise, 08/25/2015  Self assessment: Good condition., 10/13/2015  Home/Environment  Lives with Spouse. Living situation: Home/Independent. Home equipment: Glucose monitoring. Alcohol abuse in household: No. Substance abuse in household: No. Smoker in household: No. Injuries/Abuse/Neglect in household: No. Feels unsafe at home: No. Safe place to go: Yes. Family/Friends available for support: Yes. Concern for family members at home: No. Major illness in household: No. Financial concerns: No. TV/Computer concerns: No. Risks in environment: Pets/Animal exposure., 10/13/2015  Nutrition/Health  Regular, Diet restrictions: Low Sweets.,  02/27/2019  Sexual  Sexually active: No. Sexual orientation: Straight or heterosexual. Gender Identity Identifies as male., 05/13/2019  Spiritual/Cultural  [employer]aritan, 11/18/2019  Substance Use - Denies Substance Abuse, 08/14/2015  Never, 04/13/2016  Tobacco - Denies Tobacco Use, 08/14/2015  Never (less than 100 in lifetime), N/A, 01/28/2020  Immunizations  Vaccine Date Status Comments   influenza virus vaccine, inactivated 10/07/2019 Given    influenza virus vaccine, inactivated 02/28/2018 Given    tetanus/diphtheria/pertussis, acel(Tdap) 08/14/2017 Given    influenza virus vaccine, inactivated 12/21/2016 Given    pneumococcal 13-valent conjugate vaccine 09/07/2016 Given    pneumococcal 23-polyvalent vaccine 10/13/2015 Given    influenza virus vaccine, inactivated 10/13/2015 Given Other : ?POWER OUTAGE   Health Maintenance  Health Maintenance  ???Pending?(in the next year)  ??? ??OverDue  ??? ? ? ?Pneumococcal Vaccine due??and every?  ??? ? ? ?Diabetes Maintenance-Foot Exam due??08/14/18??and every 1??year(s)  ??? ? ? ?HF-Heart Failure Education due??05/28/19??and every 1??year(s)  ??? ? ? ?Colorectal Screening (Senior Wellness) due??09/04/19??and every 1??year(s)  ??? ??Due?  ??? ? ? ?Alcohol Misuse Screening due??01/01/20??and every 1??year(s)  ??? ? ? ?Cognitive Screening due??01/01/20??and every 1??year(s)  ??? ? ? ?Functional Assessment due??01/01/20??and every 1??year(s)  ??? ? ? ?HF-Ejection Fraction Past 13 Months if Hospitalized due??01/28/20??and every 1??year(s)  ??? ? ? ?HF-Fluid Restriction/Low Sodium Diet Education due??01/28/20??Variable frequency  ??? ? ? ?Zoster Vaccine due??01/28/20??and every 100??year(s)  ??? ??Refused?  ??? ? ? ?Advance Directive due??01/01/21??and every 1??year(s)  ??? ??Due In Future?  ??? ? ? ?Diabetes Maintenance-Eye Exam not due until??07/29/20??and every 1??year(s)  ??? ? ? ?HF-LVEF not due until??09/05/20??and every 2??year(s)  ??? ? ? ?Diabetes Maintenance-HgbA1c not due  until??10/13/20??and every 1??year(s)  ??? ? ? ?Diabetes Maintenance-Fasting Lipid Profile not due until??10/13/20??and every 1??year(s)  ??? ? ? ?Hypertension Management-BMP not due until??11/17/20??and every 1??year(s)  ??? ? ? ?Aspirin Therapy for CVD Prevention not due until??11/18/20??and every 1??year(s)  ??? ? ? ?HF-ACEI/ARB Prescribed if Clinically Indicated not due until??12/01/20??and every 1??year(s)  ??? ? ? ?Fall Risk Assessment not due until??01/01/21??and every 1??year(s)  ??? ? ? ?Geriatric Depression Screening not due until??01/01/21??and every 1??year(s)  ??? ? ? ?Obesity Screening not due until??01/01/21??and every 1??year(s)  ??? ? ? ?Hypertension Management-Blood Pressure not due until??01/27/21??and every 1??year(s)  ???Satisfied?(in the past 1 year)  ??? ??Satisfied?  ??? ? ? ?ADL Screening on??01/28/20.??Satisfied by So Moran LPN  ??? ? ? ?Advance Directive on??01/28/20.??Satisfied by So Moran LPN  ??? ? ? ?Aspirin Therapy for CVD Prevention on??11/18/19.  ??? ? ? ?Blood Pressure Screening on??01/28/20.??Satisfied by So Moran LPN  ??? ? ? ?Body Mass Index Check on??01/28/20.??Satisfied by So Moran LPN  ??? ? ? ?Cognitive Screening on??10/07/19.??Satisfied by Saige Castro LPN  ??? ? ? ?Coronary Artery Disease Maintenance-Antiplatelet Agent Prescribed on??11/18/19.  ??? ? ? ?Depression Screening on??01/28/20.??Satisfied by So Moran LPN  ??? ? ? ?Diabetes Maintenance-HgbA1c on??10/14/19.??Satisfied by Carolyn Clement  ??? ? ? ?Diabetes Screening on??11/18/19.??Satisfied by Sandee Parish  ??? ? ? ?Fall Risk Assessment on??01/28/20.??Satisfied by So Moran LPN  ??? ? ? ?Functional Assessment on??02/27/19.??Satisfied by Brian Dubon RN  ??? ? ? ?Geriatric Depression Screening on??01/28/20.??Satisfied by So Moran LPN  ??? ? ? ?HF-ACEI/ARB Prescribed if Clinically Indicated on??12/01/19.??Satisfied by Cayla  Elda JUÁREZ  ??? ? ? ?Hypertension Management-Blood Pressure on??01/28/20.??Satisfied by So Moran LPN  ??? ? ? ?Influenza Vaccine on??10/07/19.??Satisfied by Saige Castro LPN  ??? ? ? ?Lipid Screening on??10/14/19.??Satisfied by Carolyn Clement  ??? ? ? ?Obesity Screening on??01/28/20.??Satisfied by So Moran LPN  ??? ??Refused?  ??? ? ? ?Advance Directive on??01/28/20.??Recorded by So Moran LPN??Reason: Patient Refuses  ?

## 2022-05-02 NOTE — HISTORICAL [facility] CERNER
This is a historical note converted from Cele. Formatting and pictures may have been removed.  Please reference Cermicky for original formatting and attached multimedia. Chief Complaint  F/U. Med refills.  History of Present Illness  68-year-old  male with past medical history significant for diabetes,?hypertension, aortic aneurysm?presents to medicine clinic today for initial visit.? Patient is currently on metformin 1000 mg twice a day for diabetes,?lisinopril 20 mg and?Norvasc 5 mg?for hypertension. ?Patient reports compliance with his medication.? He states that his blood pressure is well controlled. ?Was?States that he started eating rice and FBGs has been in 150s.??States his cough resolved after the abx treatment. Diabetic eye exam?was done in 2, 2018.?Fecal immunoassay?was pos, pt was referred for colonoscopy which he had, he was told that 3 polyps were found that were removed, path was benign, he was told to get a repeat colonoscopy in 5 years. ?Denies any other complaints.  Review of Systems  Constitutional : No Fever, No chills, No sweats, No?weakness or?fatigue.  Skin : No jaundice, no rash, no pruritus.  Eye : No recent vision problems, no blurred vision or double vision.  ENMT : No sore throat, no nasal congestion.  Respiratory : No?Shortness of breath, no orthopnea, + cough, no hemoptysis.  Cardiovascular : No chest pain, no palpitations, no syncope, no peripheral edema.  Gastrointestinal : No Abdominal pain, No nausea,?No vomiting, No diarrhea.  Genitourinary? No dysuria, no hematuria, No frequency or urgency.  Endocrine: No polyuria, no polydipsia, no heat or cold intolerance.  Hematologic/lymphatic: No bruising/bleeding tendency.  Musculoskeletal :+noJoint pain, No muscle pain, No claudication.  Neurologic : No headache, no dizziness, No focal weakness, No tingling or numbness.  Psychiatric : No anxiety, no depression.  Physical Exam  Vitals & Measurements  T:?37.0? ?C (Oral)?  HR:?71(Peripheral)? RR:?18? BP:?149/84?  HT:?175?cm? WT:?78.7?kg? BMI:?25.7?  General: No acute distress.  Eye: Extraocular movements are intact, Normal conjunctiva  HENT: Normal hearing, Oral mucosa is moist, no pharyngeal erythema  Neck: Supple, Non-tender, No jugular venous distention  Respiratory: mild crackles bilaterally, No W/C/R, Respirations are non-labored, Breath sounds are equal, Symmetrical chest wall expansion.  Cardiovascular: Regular rhythm, S1, S2, No murmur, No gallop, Normal peripheral perfusion,  Gastrointestinal: Soft, Non-tender, bowel sounds present  Musculoskeletal: No tenderness, no swelling  Integumentary: Warm, Moist, No pallor,  Neurologic: Alert, Oriented, gross sensation intact, strength symmetric bilaterally, No gross focal deficits, Cranial Nerves II-XII are grossly intact.  Cognition and Speech: Oriented, Speech clear and coherent, Functional cognition intact.  Psychiatric: Cooperative, Appropriate mood & affect, Non-suicidal  Diabetic foot exam: Intact pulses bilaterally, intact deep tendon reflexes bilaterally, intact sensations?bilaterally, no calluses, no ulcers  Assessment/Plan  HTN (hypertension)?I10  Ordered:  Clinic Follow up, *Est. 05/27/19 3:00:00 CDT, Order for future visit, Diabetes  HTN (hypertension)  HLD (hyperlipidemia)  Aortic aneurysm, [facility]  Ferritin, Routine collect, 02/27/19 14:51:00 CST, Blood, Order for future visit, Stop date 02/27/19 14:51:00 CST, Lab Collect, Diabetes  HTN (hypertension)  HLD (hyperlipidemia)  Aortic aneurysm, 02/27/19 14:51:00 CST  Folate Level, Routine collect, 02/27/19 14:52:00 CST, Blood, Order for future visit, Stop date 02/27/19 14:52:00 CST, Lab Collect, Diabetes  HTN (hypertension)  HLD (hyperlipidemia)  Aortic aneurysm, 02/27/19 14:52:00 CST  Hemoglobin A1C [facility], Routine collect, 02/27/19 14:52:00 CST, Blood, Order for future visit, Stop date 02/27/19 14:52:00 CST, Lab Collect, Diabetes  HTN (hypertension)  HLD  (hyperlipidemia)  Aortic aneurysm, 02/27/19 14:52:00 CST  Iron Binding Capacity Total - Iron Profile, Routine collect, 02/27/19 14:51:00 CST, Blood, Order for future visit, Stop date 02/27/19 14:51:00 CST, Lab Collect, Diabetes  HTN (hypertension)  HLD (hyperlipidemia)  Aortic aneurysm, 02/27/19 14:51:00 CST  Lipid Panel, Routine collect, 02/27/19 14:50:00 CST, Blood, Order for future visit, Stop date 02/27/19 14:50:00 CST, Lab Collect, Diabetes  HTN (hypertension)  HLD (hyperlipidemia)  Aortic aneurysm, 02/27/19 14:50:00 CST  Microalbum/Creatinine Ratio Urine (Microalb/Creat), Routine collect, Urine, Order for future visit, 02/27/19 14:54:00 CST, Stop date 02/27/19 14:54:00 CST, Nurse collect, Diabetes  HTN (hypertension)  HLD (hyperlipidemia)  Microalbum/Creatinine Ratio Urine (Microalb/Creat), Routine collect, Urine, Order for future visit, 02/27/19 14:54:00 CST, Stop date 02/27/19 14:54:00 CST, Nurse collect, Diabetes  HTN (hypertension)  HLD (hyperlipidemia)  Aortic aneurysm  Peripheral Smear Review, Routine collect, 02/27/19 14:52:00 CST, Blood, Order for future visit, Stop date 02/27/19 14:52:00 CST, Lab Collect, Diabetes  HTN (hypertension)  HLD (hyperlipidemia)  Aortic aneurysm, 02/27/19 14:52:00 CST  Vitamin B12 Level, Routine collect, 02/27/19 14:51:00 CST, Blood, Order for future visit, Stop date 02/27/19 14:51:00 CST, Lab Collect, Diabetes  HTN (hypertension)  HLD (hyperlipidemia)  Aortic aneurysm, 02/27/19 14:51:00 CST  ?  Orders:  amLODIPine, 5 mg = 1 tab(s), Oral, Daily, # 90 tab(s), 4 Refill(s), Pharmacy: [facility]  aspirin, 81 mg = 1 tab(s), Oral, Daily, # 30 tab(s), 4 Refill(s), Pharmacy: [facility]  atorvastatin, 10 mg = 1 tab(s), Oral, Daily, # 30 tab(s), 3 Refill(s), Pharmacy: [facility]  lisinopril, 20 mg = 1 tab(s), Oral, Daily, # 90 tab(s), 5 Refill(s), Pharmacy: [facility]  metFORMIN, 1,000 mg = 1 tab(s), Oral, BID, #  180 tab(s), 3 Refill(s), Pharmacy: [facility]  ?  1. Aneurysm in ascending aorta  4.6 cm on CT thorax in 9/2018  echocardiogram 9/2018-EF 60%  advised on good control of BP (systolic goal 105-120)  ?   2. HTN  BP elevated today  cont lisinopril to 20 mg, norvasc 5 mg qday  advised to maintain a BP log and shaq;l back in 2 weeks, if BP still elevated will increase lisinopril dose  advised to keep a BP log, low salt diet  ?   3. DM  A1c?6.8 on 8/1/2018, repeat today, will get urine microalbumin/craetinine  on metformin to 1000 mg bid  diabetic eye exam-2/2018  u pr/cr 96.7  diabetic foot exam in clinic today  advised on maintaining a cbg log, pt will call in 2 weeks with readings, if elevated, will iincrease metformin dose  ?   4. CKD stage 2  ?? avoid nephrotoxic meds,  ?? follows in renal clinic  ?   5. HFpEF  ??? echo 9/2018-EF 60%  ??? will repeat echo  ??? cont aspirin, acei, will prescribe statin, hold bb due to hx of bradycardia  ??? will prescribe lasix prn  ?   6. pos fobt, colonoscopy showed polyps  ??? per pt benign  ??? requested report  ?   7. Normocytic anemia  ??? ordered iron profile, ferritin, b12, folate, peripheral smear  ?   8. Health maintainence  Received prevnar in 2016  Received pneumovax in 2017  Received Tdap in 2014  Patient believes he received the zoster vaccine.  ?  ?  F/U in 3 months.   Problem List/Past Medical History  Ongoing  Diabetes mellitus  DM (diabetes mellitus)  HTN (hypertension)  HTN - Hypertension  Historical  No qualifying data  Procedure/Surgical History  Denies   Medications  aspirin 81 mg oral tablet, 81 mg= 1 tab(s), Oral, Daily, 4 refills  atorvastatin 10 mg oral tablet, 10 mg= 1 tab(s), Oral, Daily, 3 refills  diabetic supplies, See Instructions  Glucometer, See Instructions  kaylan free style, See Instructions, 3 refills  lisinopril 20 mg oral tablet, 20 mg= 1 tab(s), Oral, Daily, 5 refills  metFORMIN 1000 mg oral tablet, 1000 mg= 1 tab(s), Oral, BID, 3  refills  Norvasc 5 mg oral tablet, 5 mg= 1 tab(s), Oral, Daily, 4 refills  Vitamin C 1000 mg oral tablet, 1000 mg= 1 tab(s), Oral, Daily, 3 refills  Allergies  No Known Medication Allergies  Social History  Alcohol - No Risk, 08/14/2015  Past, 02/27/2019  Employment/School  Unemployed, 04/13/2016  Exercise - Occasional exercise, 08/25/2015  Self assessment: Good condition., 10/13/2015  Home/Environment  Lives with Spouse. Living situation: Home/Independent. Home equipment: Glucose monitoring. Alcohol abuse in household: No. Substance abuse in household: No. Smoker in household: No. Injuries/Abuse/Neglect in household: No. Feels unsafe at home: No. Safe place to go: Yes. Family/Friends available for support: Yes. Concern for family members at home: No. Major illness in household: No. Financial concerns: No. TV/Computer concerns: No. Risks in environment: Pets/Animal exposure., 10/13/2015  Nutrition/Health  Regular, Diet restrictions: Low Sweets., 02/27/2019  Substance Abuse - Denies Substance Abuse, 08/14/2015  Never, 04/13/2016  Tobacco - Denies Tobacco Use, 08/14/2015  Never (less than 100 in lifetime), N/A, Ready to change: No. Previous treatment: None. Smokeless Tobacco Use: Never., 02/27/2019  Immunizations  Vaccine Date Status Comments   influenza virus vaccine, inactivated 02/28/2018 Given    tetanus/diphtheria/pertussis, acel(Tdap) 08/14/2017 Given    influenza virus vaccine, inactivated 12/21/2016 Given    pneumococcal 13-valent conjugate vaccine 09/07/2016 Given    pneumococcal 23-polyvalent vaccine 10/13/2015 Given    influenza virus vaccine, inactivated 10/13/2015 Given Other : ?POWER OUTAGE   Health Maintenance  Health Maintenance  ???Pending?(in the next year)  ??? ??OverDue  ??? ? ? ?Coronary Artery Disease Maintenance-Antiplatelet Agent Prescribed due??and every?  ??? ? ? ?Coronary Artery Disease Maintenance-Lipid Lowering Therapy due??and every?  ??? ? ? ?Pneumococcal Vaccine due??and every?  ??? ? ?  ?Diabetes Maintenance-Foot Exam due??08/14/18??and every 1??year(s)  ??? ? ? ?Alcohol Misuse Screening due??08/14/18??and every 1??year(s)  ??? ? ? ?Diabetes Maintenance-Fasting Lipid Profile due??02/16/19??and every 1??year(s)  ??? ? ? ?HF-Heart Failure Education due??02/16/19??and every 1??year(s)  ??? ? ? ?Smoking Cessation due??02/16/19??and every 1??year(s)  ??? ??Due?  ??? ? ? ?Diabetes Maintenance-Microalbumin due??02/27/19??Variable frequency  ??? ? ? ?Hypertension Management-Education due??02/27/19??and every 1??year(s)  ??? ? ? ?Zoster Vaccine due??02/27/19??and every 100??year(s)  ??? ??Due In Future?  ??? ? ? ?Diabetes Maintenance-Eye Exam not due until??06/11/19??and every 1??year(s)  ??? ? ? ?Diabetes Maintenance-HgbA1c not due until??08/23/19??and every 1??year(s)  ??? ? ? ?Colorectal Screening (Senior Wellness) not due until??09/04/19??and every 1??year(s)  ??? ? ? ?Hypertension Management-BMP not due until??09/06/19??and every 1??year(s)  ??? ? ? ?Diabetes Maintenance-Urine Dipstick not due until??09/06/19??and every 1??year(s)  ??? ? ? ?Diabetes Maintenance-Serum Creatinine not due until??09/06/19??and every 1??year(s)  ??? ? ? ?Advance Directive not due until??09/17/19??and every 1??year(s)  ???Satisfied?(in the past 1 year)  ??? ??Satisfied?  ??? ? ? ?ADL Screening on??02/27/19.??Satisfied by Brian Dubon RN  ??? ? ? ?Advance Directive on??09/17/18.??Satisfied by Nikhil Camacho RN  ??? ? ? ?Aspirin Therapy for CVD Prevention on??02/27/19.??Satisfied by Elda Kulkarni MD  ??? ? ? ?Blood Pressure Screening on??02/27/19.??Satisfied by Brian Dubon RN  ??? ? ? ?Body Mass Index Check on??02/27/19.??Satisfied by Brian Dubon RN  ??? ? ? ?Cognitive Screening on??02/27/19.??Satisfied by Brian Dubon RN  ??? ? ? ?Colorectal Screening on??09/04/18.??Satisfied by Alexys Rodriguez  ??? ? ? ?Colorectal Screening ([facility]) on??09/04/18.??Satisfied by Jennifer,  Alexys Espinoza  ??? ? ? ?Coronary Artery Disease Maintenance-Lipid Lowering Therapy on??02/27/19.??Satisfied by Elda Kulkarni MD  ??? ? ? ?Depression Screening on??02/27/19.??Satisfied by Brian Dubon RN  ??? ? ? ?Diabetes Maintenance-Serum Creatinine on??09/06/18.??Satisfied by Carolyn Clement  ??? ? ? ?Diabetes Maintenance-Urine Dipstick on??09/06/18.??Satisfied by Vilma Prakash  ??? ? ? ?Diabetes Maintenance-HgbA1c on??08/23/18.??Satisfied by Wilda Garcia  ??? ? ? ?Diabetes Maintenance-Eye Exam on??06/11/18.??Satisfied by Pedro Becerril  ??? ? ? ?Diabetes Screening on??09/06/18.??Satisfied by Carolyn Clement  ??? ? ? ?Fall Risk Assessment on??02/27/19.??Satisfied by Brian Dubon RN  ??? ? ? ?Functional Assessment on??02/27/19.??Satisfied by Brian Dubon RN  ??? ? ? ?Geriatric Depression Screening on??02/27/19.??Satisfied by Brian Dubon RN  ??? ? ? ?Hypertension Management-Blood Pressure on??02/27/19.??Satisfied by Brian Dubon RN  ??? ? ? ?Hypertension Management-BMP on??09/06/18.??Satisfied by Carolyn Clement  ??? ? ? ?Influenza Vaccine on??02/28/18.??Satisfied by Yumiko Santana RN  ??? ? ? ?Obesity Screening on??02/27/19.??Satisfied by Brian Dubon RN  ?  ?      Reviewed notes, labs and imaging if any. discussed assessment and plan with resident and agree with all the above findings.  No additional comments

## 2022-05-03 DIAGNOSIS — N18.2 CKD STAGE G2/A2, GFR 60-89 AND ALBUMIN CREATININE RATIO 30-299 MG/G: Primary | ICD-10-CM

## 2022-05-05 RX ORDER — ATORVASTATIN CALCIUM 40 MG/1
40 TABLET, FILM COATED ORAL DAILY
COMMUNITY
Start: 2022-02-09 | End: 2023-03-21 | Stop reason: SDUPTHER

## 2022-05-05 RX ORDER — METFORMIN HYDROCHLORIDE 1000 MG/1
1000 TABLET ORAL 2 TIMES DAILY
COMMUNITY
Start: 2022-02-12 | End: 2023-03-21 | Stop reason: SDUPTHER

## 2022-05-05 RX ORDER — IBUPROFEN 100 MG/5ML
1000 SUSPENSION, ORAL (FINAL DOSE FORM) ORAL DAILY
COMMUNITY
Start: 2021-10-04 | End: 2023-03-21 | Stop reason: SDUPTHER

## 2022-05-05 RX ORDER — LINAGLIPTIN 5 MG/1
5 TABLET, FILM COATED ORAL DAILY
COMMUNITY
Start: 2022-03-04 | End: 2022-11-28 | Stop reason: SDUPTHER

## 2022-05-05 RX ORDER — LISINOPRIL 20 MG/1
20 TABLET ORAL DAILY
COMMUNITY
Start: 2022-04-18 | End: 2022-11-28 | Stop reason: SDUPTHER

## 2022-05-05 RX ORDER — ACYCLOVIR 400 MG/1
TABLET ORAL
COMMUNITY
Start: 2022-02-07 | End: 2022-06-14

## 2022-05-05 RX ORDER — AMLODIPINE BESYLATE 10 MG/1
10 TABLET ORAL DAILY
COMMUNITY
Start: 2022-04-11 | End: 2022-11-28 | Stop reason: SDUPTHER

## 2022-05-05 RX ORDER — PREDNISOLONE ACETATE 10 MG/ML
SUSPENSION/ DROPS OPHTHALMIC
COMMUNITY
Start: 2022-03-18 | End: 2022-06-14

## 2022-05-06 ENCOUNTER — OFFICE VISIT (OUTPATIENT)
Dept: OPHTHALMOLOGY | Facility: CLINIC | Age: 72
End: 2022-05-06
Payer: MEDICARE

## 2022-05-06 VITALS — WEIGHT: 152.13 LBS | BODY MASS INDEX: 22.53 KG/M2 | HEIGHT: 69 IN

## 2022-05-06 DIAGNOSIS — B00.52 HERPES SIMPLEX VIRUS (HSV) STROMAL KERATITIS OF LEFT EYE: Primary | ICD-10-CM

## 2022-05-06 PROCEDURE — 99213 OFFICE O/P EST LOW 20 MIN: CPT | Mod: PBBFAC,PO | Performed by: OPHTHALMOLOGY

## 2022-05-06 NOTE — PROGRESS NOTES
Assessment /Plan     For exam results, see Encounter Report.    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 Viral Keratitis, left eye   - Presented with corneal edema and D folds. 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  Pts PCP notified.   - Started on acyclovir 400mg PO 5x daily   - Interval exam 3/4/22: VA improved to 20/25. No recurrent corneal edema. AC quiet. IOP wnl. Patient remains asymptomatic.   - 5/6/22: Patient denies symptoms. Using PF once daily. No evidence of recurrence at this time.   - Off acyclovir   - Stop PF   - PFATs 4-6x daily   - Counseled patient on return precautions     2. H/o Corneal Ulcer with metallic FB OS     - Stable, 2x2 mm stromal scar with minimal corneal thinning without epi defect or infiltrate     - Stressed using eye protection       3. Combined form of age-related cataract, both eyes   - NVS, CTM      - Note, will likely need course of acyclovir pre-operatively to prevent HSV re-activation as above       4. DM without retinopathy    - Well controlled, on metformin, stressed BS/BP control    - A1c 7.1%, no retinopathy on examination    - monitor     RTC 3-4 months K/AC check, sooner if needed

## 2022-06-01 DIAGNOSIS — E11.40 TYPE 2 DIABETES MELLITUS WITH DIABETIC NEUROPATHY, WITHOUT LONG-TERM CURRENT USE OF INSULIN: Primary | ICD-10-CM

## 2022-06-01 RX ORDER — ASPIRIN 81 MG/1
81 TABLET ORAL DAILY
COMMUNITY
End: 2023-03-21 | Stop reason: SDUPTHER

## 2022-06-01 NOTE — TELEPHONE ENCOUNTER
----- Message from Kiana Linton sent at 6/1/2022  8:27 AM CDT -----  Regarding: Medication/jagdeep  Patient is requesting glucometer strips. [facility]

## 2022-06-02 NOTE — TELEPHONE ENCOUNTER
Patient made aware rx. Refilled and sent to [facility] pharmacy. Pt. verbalized understanding.

## 2022-06-07 ENCOUNTER — TELEPHONE (OUTPATIENT)
Dept: NEPHROLOGY | Facility: CLINIC | Age: 72
End: 2022-06-07
Payer: MEDICARE

## 2022-06-13 ENCOUNTER — LAB VISIT (OUTPATIENT)
Dept: LAB | Facility: HOSPITAL | Age: 72
End: 2022-06-13
Attending: STUDENT IN AN ORGANIZED HEALTH CARE EDUCATION/TRAINING PROGRAM
Payer: MEDICARE

## 2022-06-13 DIAGNOSIS — N18.9 CHRONIC KIDNEY DISEASE, UNSPECIFIED CKD STAGE: ICD-10-CM

## 2022-06-13 LAB
ALBUMIN SERPL-MCNC: 3.7 GM/DL (ref 3.4–4.8)
ALBUMIN/GLOB SERPL: 1.3 RATIO (ref 1.1–2)
ALP SERPL-CCNC: 50 UNIT/L (ref 40–150)
ALT SERPL-CCNC: 14 UNIT/L (ref 0–55)
APPEARANCE UR: CLEAR
AST SERPL-CCNC: 14 UNIT/L (ref 5–34)
BACTERIA #/AREA URNS AUTO: ABNORMAL /HPF
BILIRUB UR QL STRIP.AUTO: NEGATIVE MG/DL
BILIRUBIN DIRECT+TOT PNL SERPL-MCNC: 0.4 MG/DL
BUN SERPL-MCNC: 21.9 MG/DL (ref 8.4–25.7)
CALCIUM SERPL-MCNC: 9.5 MG/DL (ref 8.8–10)
CHLORIDE SERPL-SCNC: 108 MMOL/L (ref 98–107)
CO2 SERPL-SCNC: 25 MMOL/L (ref 23–31)
COLOR UR AUTO: ABNORMAL
CREAT SERPL-MCNC: 1.09 MG/DL (ref 0.73–1.18)
GLOBULIN SER-MCNC: 2.9 GM/DL (ref 2.4–3.5)
GLUCOSE SERPL-MCNC: 131 MG/DL (ref 82–115)
GLUCOSE UR QL STRIP.AUTO: NORMAL MG/DL
HYALINE CASTS #/AREA URNS LPF: ABNORMAL /LPF
KETONES UR QL STRIP.AUTO: NEGATIVE MG/DL
LEUKOCYTE ESTERASE UR QL STRIP.AUTO: NEGATIVE UNIT/L
MUCOUS THREADS URNS QL MICRO: ABNORMAL /LPF
NITRITE UR QL STRIP.AUTO: NEGATIVE
PH UR STRIP.AUTO: 5.5 [PH]
POTASSIUM SERPL-SCNC: 4 MMOL/L (ref 3.5–5.1)
PROT SERPL-MCNC: 6.6 GM/DL (ref 5.8–7.6)
PROT UR QL STRIP.AUTO: NEGATIVE MG/DL
RBC #/AREA URNS AUTO: ABNORMAL /HPF
RBC UR QL AUTO: NEGATIVE UNIT/L
SODIUM SERPL-SCNC: 142 MMOL/L (ref 136–145)
SP GR UR STRIP.AUTO: 1.02
SQUAMOUS #/AREA URNS LPF: ABNORMAL /HPF
UROBILINOGEN UR STRIP-ACNC: NORMAL MG/DL
WBC #/AREA URNS AUTO: ABNORMAL /HPF

## 2022-06-13 PROCEDURE — 84156 ASSAY OF PROTEIN URINE: CPT

## 2022-06-13 PROCEDURE — 81001 URINALYSIS AUTO W/SCOPE: CPT

## 2022-06-13 PROCEDURE — 36415 COLL VENOUS BLD VENIPUNCTURE: CPT

## 2022-06-13 PROCEDURE — 80053 COMPREHEN METABOLIC PANEL: CPT

## 2022-06-14 ENCOUNTER — OFFICE VISIT (OUTPATIENT)
Dept: NEPHROLOGY | Facility: CLINIC | Age: 72
End: 2022-06-14
Payer: MEDICARE

## 2022-06-14 VITALS
WEIGHT: 154.81 LBS | HEIGHT: 69 IN | BODY MASS INDEX: 22.93 KG/M2 | TEMPERATURE: 98 F | DIASTOLIC BLOOD PRESSURE: 80 MMHG | SYSTOLIC BLOOD PRESSURE: 148 MMHG | OXYGEN SATURATION: 100 % | HEART RATE: 60 BPM | RESPIRATION RATE: 20 BRPM

## 2022-06-14 DIAGNOSIS — I10 HYPERTENSION, UNSPECIFIED TYPE: ICD-10-CM

## 2022-06-14 DIAGNOSIS — R73.01 IMPAIRED FASTING GLUCOSE: ICD-10-CM

## 2022-06-14 DIAGNOSIS — N18.2 CKD STAGE G2/A1, GFR 60-89 AND ALBUMIN CREATININE RATIO <30 MG/G: Primary | ICD-10-CM

## 2022-06-14 PROBLEM — I25.10 CORONARY ARTERY DISEASE INVOLVING NATIVE CORONARY ARTERY OF NATIVE HEART WITHOUT ANGINA PECTORIS: Status: ACTIVE | Noted: 2021-11-15

## 2022-06-14 PROBLEM — Q23.1 BICUSPID AORTIC VALVE: Status: ACTIVE | Noted: 2021-11-15

## 2022-06-14 PROBLEM — Q23.81 BICUSPID AORTIC VALVE: Status: ACTIVE | Noted: 2021-11-15

## 2022-06-14 PROBLEM — E11.9 TYPE 2 DIABETES MELLITUS: Status: ACTIVE | Noted: 2019-12-16

## 2022-06-14 PROBLEM — I71.9 AORTIC ANEURYSM: Status: ACTIVE | Noted: 2019-12-16

## 2022-06-14 PROCEDURE — 99213 OFFICE O/P EST LOW 20 MIN: CPT | Mod: S$PBB,,, | Performed by: NURSE PRACTITIONER

## 2022-06-14 PROCEDURE — 99215 OFFICE O/P EST HI 40 MIN: CPT | Mod: PBBFAC | Performed by: NURSE PRACTITIONER

## 2022-06-14 PROCEDURE — 99213 PR OFFICE/OUTPT VISIT, EST, LEVL III, 20-29 MIN: ICD-10-PCS | Mod: S$PBB,,, | Performed by: NURSE PRACTITIONER

## 2022-06-14 NOTE — PROGRESS NOTES
"[facility] Nephrology Clinic Note  Chief Complaint   Patient presents with    Chronic Kidney Disease     Follow up      History of Present Illness  Mr. Espinoza is a 71-year-old  male with past medical history of chronic kidney disease, diabetes, hypertension, aortic aneurysm, and cataract.  Patient presents for follow-up appointment in Nephrology Clinic today, denies complaints.    Review of Systems  Twelve point review of systems conducted, negative except as stated in history of present illness.    Review of patient's allergies indicates:  No Known Allergies    Past Medical History:   Past Medical History:   Diagnosis Date    Cataract     CKD (chronic kidney disease)     DM (diabetes mellitus)     Hypertension        Procedure History: History reviewed. No pertinent surgical history.    Family History: family history includes Diabetes in his brother; Hypertension in his father.    Social History:  reports that he has never smoked. He has never used smokeless tobacco. He reports previous alcohol use. He reports that he does not use drugs.    Physical Exam:   BP (!) 148/80 (BP Location: Left arm, Patient Position: Sitting, BP Method: Medium (Automatic))   Pulse 60   Temp 98.1 °F (36.7 °C) (Oral)   Resp 20   Ht 5' 9.29" (1.76 m)   Wt 70.2 kg (154 lb 12.8 oz)   SpO2 100%   BMI 22.67 kg/m²  Body mass index is 22.67 kg/m².  General appearance: Patient is in no acute distress.  Skin: No rashes or wounds.  HEENT: PERRLA, EOMI, no scleral icterus, no JVD. Neck is supple.  Chest: Respirations are unlabored. Lungs sounds are clear.   Heart: S1, S2.   Abdomen: Benign.  : Deferred.  Extremities: No edema, peripheral pulses are palpable.   Neuro: No focal deficits.     Home Medications:  Current Outpatient Medications   Medication Sig    amLODIPine (NORVASC) 10 MG tablet Take 10 mg by mouth once daily. for 90 days    ascorbic acid, vitamin C, (VITAMIN C) 1000 MG tablet Take 1,000 mg by mouth once daily. "    aspirin (ECOTRIN) 81 MG EC tablet Take 81 mg by mouth once daily.    atorvastatin (LIPITOR) 40 MG tablet Take 40 mg by mouth once daily.    blood sugar diagnostic Strp Use to check blood sugar once a day    lisinopriL (PRINIVIL,ZESTRIL) 20 MG tablet Take 20 mg by mouth once daily. for 90 days    metFORMIN (GLUCOPHAGE) 1000 MG tablet Take 1,000 mg by mouth 2 (two) times daily.    TRADJENTA 5 mg Tab tablet Take 5 mg by mouth once daily.    acyclovir (ZOVIRAX) 400 MG tablet TAKE 1 TABLET BY MOUTH 5 TIMES DAILY    prednisoLONE acetate (PRED FORTE) 1 % DrpS INSTILL 1 DROP IN THE LEFT EYE TWICE DAILY     No current facility-administered medications for this visit.        Laboratory Data:   Recent Results (from the past 336 hour(s))   Comprehensive Metabolic Panel    Collection Time: 06/13/22  6:48 AM   Result Value Ref Range    Sodium Level 142 136 - 145 mmol/L    Potassium Level 4.0 3.5 - 5.1 mmol/L    Chloride 108 (H) 98 - 107 mmol/L    Carbon Dioxide 25 23 - 31 mmol/L    Glucose Level 131 (H) 82 - 115 mg/dL    Blood Urea Nitrogen 21.9 8.4 - 25.7 mg/dL    Creatinine 1.09 0.73 - 1.18 mg/dL    Calcium Level Total 9.5 8.8 - 10.0 mg/dL    Protein Total 6.6 5.8 - 7.6 gm/dL    Albumin Level 3.7 3.4 - 4.8 gm/dL    Globulin 2.9 2.4 - 3.5 gm/dL    Albumin/Globulin Ratio 1.3 1.1 - 2.0 ratio    Bilirubin Total 0.4 <=1.5 mg/dL    Alkaline Phosphatase 50 40 - 150 unit/L    Alanine Aminotransferase 14 0 - 55 unit/L    Aspartate Aminotransferase 14 5 - 34 unit/L    Estimated GFR- >60 mls/min/1.73/m2   Protein/Creatinine Ratio, Urine    Collection Time: 06/13/22  6:48 AM   Result Value Ref Range    Urine Protein Level <6.8 mg/dL    Urine Creatinine 117.3 63.0 - 166.0 mg/dL    Urine Protein/Creatinine Ratio <58.0 <=200.0 mg/gm Cr   Urinalysis    Collection Time: 06/13/22  6:48 AM   Result Value Ref Range    Color, UA Light-Yellow (A) Yellow, Colorless, Other, Clear    Appearance, UA Clear Clear    Specific  Gravity, UA 1.022     pH, UA 5.5 5.0, 5.5, 6.0, 6.5, 7.0, 7.5, 8.0, 8.5    Protein, UA Negative Negative, 300  mg/dL    Glucose, UA Normal Negative, Normal mg/dL    Ketones, UA Negative Negative, +1, +2, +3, +4, +5, >=160, >=80 mg/dL    Blood, UA Negative Negative unit/L    Bilirubin, UA Negative Negative mg/dL    Urobilinogen, UA Normal 0.2, 1.0, Normal mg/dL    Nitrites, UA Negative Negative    Leukocyte Esterase, UA Negative Negative, 75  unit/L    WBC, UA 0-5 None Seen, 0-2, 3-5, 0-5 /HPF    Bacteria, UA None Seen None Seen /HPF    Squamous Epithelial Cells, UA None Seen None Seen /HPF    Mucous, UA Trace (A) None Seen /LPF    Hyaline Casts, UA 0-2 (A) None Seen /lpf    RBC, UA 0-5 None Seen, 0-2, 3-5, 0-5 /HPF     Imaging:  Retroperitoneal ultrasound  Clinical history is chronic kidney disease, renal cyst.  This is compared to a CT scan of the chest from 5/1/2018  The right kidney measures 9.4 x 4.5 x 6.6 cm. There is a cortical cyst present measuring 3.3 x 2.8 x 3.2 cm. There is no hydronephrosis.   The left kidney measures 12.8 x 5.8 x 6.1 cm. There are multiple cyst present. The 2 largest measures 4.6 x 4.4 x 4.1 cm and 3.2 x 3.8 x 4.6 cm. There is no hydronephrosis.  IMPRESSION: Bilateral renal cyst.    Electronically Signed By: Salbador Cedillo MD  Date/Time Signed: 08/23/2018 13:21    Impression and Plan     CKD stage G2/A1, GFR 60-89 and albumin creatinine ratio <30 mg/g  -     Comprehensive Metabolic Panel; Future; Expected date: 06/07/2022  -     Protein/Creatinine Ratio, Urine; Future; Expected date: 06/07/2022  -     Urinalysis; Future; Expected date: 06/07/2022  -     Comprehensive Metabolic Panel; Future; Expected date: 12/14/2022  -     Protein/Creatinine Ratio, Urine; Future; Expected date: 12/14/2022  -     Urinalysis; Future; Expected date: 12/14/2022  -     Hemoglobin A1C; Future; Expected date: 12/14/2022  Renal indices are stable, no significant proteinuria noted.  Previous ultrasound was  unremarkable.  Likely diabetic kidney disease.  Continue risk factor management and TATI blockade.  Continue:  -follow 2 g a day dietary sodium restriction  -controlled diabetes (goal A1c less than 7%)  -control high blood pressure (goal blood pressure is less than 130/80, please check blood pressure twice a week and bring blood pressure logs to office visit)  -exercise at least 30 minutes a day, 5 days a week  -maintain healthy weight  -decrease or stop alcohol use  -do not smoke  -stay well hydrated (drink water only, avoid juices, sweet tea, and sodas)  -ask about staying up-to-date on vaccinations (flu vaccine, pneumonia vaccine, hepatitis B vaccine)  -avoid excessive use of NSAIDs (ibuprofen, naproxen, Aleve, Advil, Toradol, Mobic), take Tylenol as needed for headache or mild pain  -take cholesterol lowering medications if prescribed (LDL goal less than 100)    Follow-up with the primary care provider as scheduled.  Return to subspecialty nephrology (kidney) clinic with routine labs in 6 months    Hypertension, unspecified type  Blood pressure is at goal for age.  Continue current medication regimen and 2 g a day dietary sodium restriction.    BMI 22.0-22.9, adult  Continue well-balanced diet.    Impaired fasting glucose   -     Hemoglobin A1C; Future; Expected date: 12/14/2022

## 2022-06-16 ENCOUNTER — OFFICE VISIT (OUTPATIENT)
Dept: INTERNAL MEDICINE | Facility: CLINIC | Age: 72
End: 2022-06-16
Payer: MEDICARE

## 2022-06-16 VITALS
TEMPERATURE: 98 F | SYSTOLIC BLOOD PRESSURE: 130 MMHG | BODY MASS INDEX: 22.7 KG/M2 | DIASTOLIC BLOOD PRESSURE: 74 MMHG | HEART RATE: 52 BPM | WEIGHT: 153.25 LBS | RESPIRATION RATE: 18 BRPM | HEIGHT: 69 IN

## 2022-06-16 DIAGNOSIS — Z12.11 SCREENING FOR COLORECTAL CANCER: Primary | ICD-10-CM

## 2022-06-16 DIAGNOSIS — Z12.12 SCREENING FOR COLORECTAL CANCER: Primary | ICD-10-CM

## 2022-06-16 PROCEDURE — 99214 OFFICE O/P EST MOD 30 MIN: CPT | Mod: PBBFAC

## 2022-06-16 NOTE — PROGRESS NOTES
[facility]  [address] [facility] [address]      PATIENT NAME: Duran Espinoza  : 1950  DATE: 22  MRN: [id number]      Billing Provider: RESIDENT Wilson, [facility]  Level of Service:   Patient PCP Information     Provider PCP Type    Amber Mittal MD General          Reason for Visit / Chief Complaint: Follow-up (Denies any concerns at this time)       History of Present Illness / Problem Focused Workflow     71-year-old  male with past medical history significant for diabetes, hypertension, aortic aneurysm, CKD 2 presents to medicine clinic today for follow up visit/ med refill. Patient reports compliance with his medication.     Today-patient presents for follow-up. No new acute complaints. Occasionally misses home meds.  No need for refills. Next [facility] appt in 2022.       Review of Systems     10-point ROS performed and negative except as above.      Medical / Social / Family History     Past Medical History:   Diagnosis Date    Cataract     CKD (chronic kidney disease)     DM (diabetes mellitus)     Hypertension        No past surgical history on file.    Social History    reports that he has never smoked. He has never used smokeless tobacco. He reports previous alcohol use. He reports that he does not use drugs.    Family History  's family history includes Diabetes in his brother; Hypertension in his father.    Medications and Allergies     Medications  Outpatient Medications Marked as Taking for the 22 encounter (Office Visit) with RESIDENT Wilson, [facility]   Medication Sig Dispense Refill    amLODIPine (NORVASC) 10 MG tablet Take 10 mg by mouth once daily. for 90 days      ascorbic acid, vitamin C, (VITAMIN C) 1000 MG tablet Take 1,000 mg by mouth once daily.      aspirin (ECOTRIN) 81 MG EC tablet Take 81 mg by mouth once daily.      atorvastatin (LIPITOR) 40 MG  tablet Take 40 mg by mouth once daily.      blood sugar diagnostic Strp Use to check blood sugar once a day 100 each 3    lisinopriL (PRINIVIL,ZESTRIL) 20 MG tablet Take 20 mg by mouth once daily. for 90 days      metFORMIN (GLUCOPHAGE) 1000 MG tablet Take 1,000 mg by mouth 2 (two) times daily.      TRADJENTA 5 mg Tab tablet Take 5 mg by mouth once daily.         Allergies  Review of patient's allergies indicates:  No Known Allergies    Physical Examination     Vitals:    06/16/22 0757   BP: 130/74   Pulse: (!) 52   Resp: 18   Temp: 97.9 °F (36.6 °C)         General: No acute distress  HEENT: Normocephalic, atraumatic. No scleral icterus.   Cardiovascular: Regular rate. Regular rhythm. Normal S1 & S2 w/out murmurs, rubs or gallops.  Pulmonary: Bilateral symmetric chest rise. Non-labored, CTAB. No wheezes, No crackles/rhonchi.   Abdominal:  Soft. nondistended. Bowel sounds present. No obvious masses. Not tender to palpation.   Extremities: No clubbing, cyanosis. No LE edema. Pulses good and equal bilaterally.   Skin:  Warm & dry. No rashes or skin lesions.   Neuro:   Awake, alert, & oriented to person, place & time. Responds to questions and follows simple commands appropriately. No focal deficits.   Psychiatric: Mood and affect normal         Results   Reviewed recent labs.      Assessment and Plan (including Health Maintenance)     Aneurysm in ascending aorta- Stable  Possibly tertiary syphilis manifestation  4.6 cm on CT thorax in 9/2018  4.7 cm on CT thorax in 10/2019  echocardiogram 9/2018-EF 60%  advised on good control of BP (systolic goal 110-120)  following a Cardiologist in [place]. Last visit on 11/2021, everything good per patient follow-up in this coming November.  Have been trying to get records from [facility] [place] multiple times without any success. Pt reported that he even told them to fax the notes/CT scan over to our clinic during his 11/2021 visit. Will try to get records again today. Consent  for records release obtained.     Tertiary syphilis  Syphilis positive on 2/2021- dils 2, repeat on 6/2021 with dils 0  h/o Aortic aneurysm, possibly tertiary involvement  s/p 3x Bicillin injections completed    HTN  well controlled  cont Norvasc to 10 mg daily, lisinopril 20 mg daily  advised to keep a BP log, and to follow low salt diet    DM  Well controlled  urine microalbumin/creatinine wnl in 2019, 2020. Currently on ACEI  Continue on metformin to 1000 mg bid, Tradjenta 5mg daily.  diabetic eye exam- follows optho   diabetic foot exam in clinic today- as above  advised on maintaining a cbg log an following diabetic diet and daily exercise  Advised to stop binge eating and drinking at night    CKD stage 2  TAMMIE 2/2 poor hydration, - resolved  At baseline poor PO water intake, has been encouraged to drink more every clinic visit  avoid nephrotoxic meds  follows in renal clinic    HFpEF  echo 9/2018-EF 60%  cont aspirin, acei, statin  holding bb due to hx of bradycardia    Chronic mild leukopenia, 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 ethnic leukopenia   Negative HIV, hep panel    Normocytic anemia  Iron panel , B12 , folate wnl  Likely 2/2 CKD    Hypercalcemia-resolved  Normal BMP, vit D and PTH    Health maintainence  Received Prevnar in 2016  Received Pneumovax in 2017  Received Tdap in 2014  Received Zoster on 2/22/21 and 6/15/21  Flu shot UTD  COVIS x2 doses done  FOBT in 2018 positive. Colonoscopy done on 2017/2018 (pt cannot recall), no records here, repeat in 5 yrs. FIT 2/2020 negative. 6/2021 neg. Ordered cologuard  LDCT- Never smoked        Health Maintenance Due   Topic Date Due    Hepatitis C Screening  Never done    Foot Exam  Never done    Colorectal Cancer Screening  Never done    Shingles Vaccine (3 of 3) 04/19/2021    Diabetes Urine Screening  09/29/2021    Hemoglobin A1c  03/27/2022         Health Maintenance Topics with due status: Not Due       Topic Last Completion Date    TETANUS VACCINE 08/14/2017    Lipid  Panel 11/23/2021    Eye Exam 05/06/2022       No follow-ups on file.     Future Appointments   Date Time Provider Department Center   8/8/2022  7:30 AM [facility] [place]    12/14/2022 10:15 AM [facility]            Signature:  Amber Mittal MD  [facility]  [address] [facility] [address]    Date of encounter: 6/16/22

## 2022-06-16 NOTE — PROGRESS NOTES
Attending Physician Addendum  Pt seen and discussed with medicine resident in clinic  Care provided is appropriate  Agree with above ASSESSMENT AND PLAN

## 2022-07-13 LAB — NONINV COLON CA DNA+OCC BLD SCRN STL QL: NEGATIVE

## 2022-07-15 ENCOUNTER — TELEPHONE (OUTPATIENT)
Dept: INTERNAL MEDICINE | Facility: CLINIC | Age: 72
End: 2022-07-15
Payer: MEDICARE

## 2022-07-15 NOTE — TELEPHONE ENCOUNTER
----- Message from Alejandra Vasquez sent at 7/15/2022  9:48 AM CDT -----  Regarding: Dr. Mittal  Pt requesting test results on [facility].  Please Advise.    Thanks

## 2022-08-08 ENCOUNTER — OFFICE VISIT (OUTPATIENT)
Dept: OPHTHALMOLOGY | Facility: CLINIC | Age: 72
End: 2022-08-08
Payer: MEDICARE

## 2022-08-08 VITALS — WEIGHT: 154.31 LBS | BODY MASS INDEX: 22.85 KG/M2 | HEIGHT: 69 IN

## 2022-08-08 DIAGNOSIS — B00.52 HERPES SIMPLEX VIRUS (HSV) STROMAL KERATITIS OF LEFT EYE: Primary | ICD-10-CM

## 2022-08-08 PROCEDURE — 99213 OFFICE O/P EST LOW 20 MIN: CPT | Mod: PBBFAC,PO

## 2022-08-08 NOTE — PROGRESS NOTES
Assessment/Plan    1. Hx of Viral Keratitis, left eye   - Presented with corneal edema and D folds. 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  Pts PCP notified.   - Started on acyclovir 400mg PO 5x daily   - Interval exam 3/4/22: VA improved to 20/25. No recurrent corneal edema. AC quiet. IOP wnl. Patient remains asymptomatic.   - 5/6/22: Patient denies symptoms. Using PF once daily. No evidence of recurrence at this time.   - Off acyclovir and pred forte   - PFATs 4-6x daily   - Counseled patient on return precautions     2. H/o Corneal Ulcer with metallic FB OS     - Stable, 2x2 mm stromal scar with minimal corneal thinning without epi defect or infiltrate     - Stressed using eye protection       3. Combined form of age-related cataract, both eyes   - NVS, CTM      - Note, will likely need course of acyclovir pre-operatively to prevent HSV re-activation as above       4. DM without retinopathy    - Well controlled, on metformin, stressed BS/BP control    - A1c 7.1%, no retinopathy on examination. Next DFE due 12/2022    - monitor     RTC 4 months DFE

## 2022-08-24 ENCOUNTER — HOSPITAL ENCOUNTER (EMERGENCY)
Facility: HOSPITAL | Age: 72
Discharge: HOME | End: 2022-08-25
Attending: STUDENT IN AN ORGANIZED HEALTH CARE EDUCATION/TRAINING PROGRAM
Payer: MEDICARE

## 2022-08-24 DIAGNOSIS — S61.211A LACERATION OF LEFT INDEX FINGER WITHOUT FOREIGN BODY WITHOUT DAMAGE TO NAIL, INITIAL ENCOUNTER: Primary | ICD-10-CM

## 2022-08-24 PROCEDURE — 63600175 PHARM REV CODE 636 W HCPCS: Performed by: STUDENT IN AN ORGANIZED HEALTH CARE EDUCATION/TRAINING PROGRAM

## 2022-08-24 PROCEDURE — 90471 IMMUNIZATION ADMIN: CPT | Performed by: STUDENT IN AN ORGANIZED HEALTH CARE EDUCATION/TRAINING PROGRAM

## 2022-08-24 PROCEDURE — 99284 EMERGENCY DEPT VISIT MOD MDM: CPT | Mod: 25

## 2022-08-24 PROCEDURE — 90715 TDAP VACCINE 7 YRS/> IM: CPT | Performed by: STUDENT IN AN ORGANIZED HEALTH CARE EDUCATION/TRAINING PROGRAM

## 2022-08-24 PROCEDURE — 12001 RPR S/N/AX/GEN/TRNK 2.5CM/<: CPT

## 2022-08-24 RX ORDER — SULFAMETHOXAZOLE AND TRIMETHOPRIM 800; 160 MG/1; MG/1
1 TABLET ORAL 2 TIMES DAILY
Qty: 14 TABLET | Refills: 0 | Status: SHIPPED | OUTPATIENT
Start: 2022-08-24 | End: 2022-08-31

## 2022-08-24 RX ORDER — LIDOCAINE HYDROCHLORIDE 10 MG/ML
5 INJECTION, SOLUTION EPIDURAL; INFILTRATION; INTRACAUDAL; PERINEURAL
Status: DISCONTINUED | OUTPATIENT
Start: 2022-08-24 | End: 2022-08-25 | Stop reason: HOSPADM

## 2022-08-24 RX ADMIN — TETANUS TOXOID, REDUCED DIPHTHERIA TOXOID AND ACELLULAR PERTUSSIS VACCINE, ADSORBED 0.5 ML: 5; 2.5; 8; 8; 2.5 SUSPENSION INTRAMUSCULAR at 10:08

## 2022-08-25 VITALS
DIASTOLIC BLOOD PRESSURE: 72 MMHG | SYSTOLIC BLOOD PRESSURE: 145 MMHG | TEMPERATURE: 98 F | HEART RATE: 72 BPM | BODY MASS INDEX: 22.96 KG/M2 | HEIGHT: 69 IN | WEIGHT: 155 LBS | OXYGEN SATURATION: 100 % | RESPIRATION RATE: 18 BRPM

## 2022-08-25 NOTE — ED PROVIDER NOTES
Encounter Date: 8/24/2022       History     Chief Complaint   Patient presents with    Laceration     Laceration to left medial first finger while cutting metal around 4pm.  Hemostasis obtained PTA.  Jagged lac redressed.       Duran Espinoza is a 71 y.o. male who presents to the ED with complaints of a left indext finger laceration that started 6 hours ago after he cut his finger on a piece of metal. He states his tetanus vaccine is not up to date. He reports full range of motion in that finger. No bleeding currently.        The history is provided by the patient. No  was used.     Review of patient's allergies indicates:  No Known Allergies  Past Medical History:   Diagnosis Date    Cataract     CKD (chronic kidney disease)     DM (diabetes mellitus)     Hypertension      History reviewed. No pertinent surgical history.  Family History   Problem Relation Age of Onset    Hypertension Father     Diabetes Brother      Social History     Tobacco Use    Smoking status: Never Smoker    Smokeless tobacco: Never Used   Substance Use Topics    Alcohol use: Not Currently    Drug use: Never     Review of Systems   Constitutional: Negative for chills, fatigue and fever.   HENT: Negative for congestion, ear pain, sinus pain and sore throat.    Eyes: Negative for pain.   Respiratory: Negative for cough, chest tightness and shortness of breath.    Cardiovascular: Negative for chest pain.   Gastrointestinal: Negative for abdominal pain, constipation, diarrhea, nausea and vomiting.   Genitourinary: Negative for dysuria.   Musculoskeletal: Negative for back pain, joint swelling and myalgias.   Skin: Positive for wound. Negative for color change and rash.   Neurological: Negative for dizziness and weakness.   Psychiatric/Behavioral: Negative for behavioral problems and confusion.       Physical Exam     Initial Vitals [08/24/22 2208]   BP Pulse Resp Temp SpO2   (!) 160/84 60 17 98.4 °F (36.9 °C) 99 %       MAP       --         Physical Exam    Vitals reviewed.  Constitutional: He appears well-developed and well-nourished.   HENT:   Head: Normocephalic and atraumatic.   Nose: Nose normal.   Eyes: Conjunctivae and EOM are normal. Pupils are equal, round, and reactive to light.   Neck: Neck supple.   Normal range of motion.  Cardiovascular: Normal rate, regular rhythm, normal heart sounds and intact distal pulses.   No murmur heard.  Pulmonary/Chest: Breath sounds normal. No respiratory distress. He has no wheezes. He has no rhonchi. He has no rales.   Abdominal: Abdomen is soft. Bowel sounds are normal. He exhibits no distension. There is no abdominal tenderness.   Musculoskeletal:         General: Tenderness (left index finger tenderness overlying laceration, see skin exam) present. Normal range of motion.      Cervical back: Normal range of motion and neck supple.     Neurological: He is alert.   Skin: Skin is warm. Capillary refill takes less than 2 seconds. Laceration (L index finger dorsal aspect, distal finger tip, no nail involvement, linear measuring approximately 1.5 cm) noted.   Psychiatric: He has a normal mood and affect. Thought content normal.         ED Course   Lac Repair    Date/Time: 8/24/2022 11:49 PM  Performed by: Yas Sofia PA-C  Authorized by: Emeterio Osborn MD     Consent:     Consent obtained:  Verbal    Consent given by:  Patient    Risks, benefits, and alternatives were discussed: yes      Risks discussed:  Infection, pain, need for additional repair, poor cosmetic result, tendon damage, poor wound healing and nerve damage    Alternatives discussed:  No treatment  Universal protocol:     Procedure explained and questions answered to patient or proxy's satisfaction: yes      Relevant documents present and verified: yes      Test results available: yes      Imaging studies available: yes      Required blood products, implants, devices, and special equipment available: yes       Site/side marked: yes      Immediately prior to procedure, a time out was called: yes      Patient identity confirmed:  Verbally with patient  Anesthesia:     Anesthesia method:  Local infiltration    Local anesthetic:  Lidocaine 1% w/o epi  Laceration details:     Location:  Finger    Finger location:  L index finger    Length (cm):  1.5  Pre-procedure details:     Preparation:  Patient was prepped and draped in usual sterile fashion  Exploration:     Limited defect created (wound extended): no      Imaging outcome: foreign body not noted      Wound exploration: wound explored through full range of motion      Contaminated: no    Treatment:     Area cleansed with:  Saline and povidone-iodine    Amount of cleaning:  Standard    Irrigation solution:  Sterile water    Irrigation volume:  50 ml    Irrigation method:  Syringe    Visualized foreign bodies/material removed: no      Debridement:  None    Undermining:  None    Scar revision: no    Skin repair:     Repair method:  Sutures    Suture size:  4-0    Suture material:  Prolene    Suture technique:  Simple interrupted    Number of sutures:  7  Approximation:     Approximation:  Close  Repair type:     Repair type:  Simple  Post-procedure details:     Dressing:  Antibiotic ointment and adhesive bandage    Procedure completion:  Tolerated well, no immediate complications      Labs Reviewed - No data to display       Imaging Results          X-Ray Finger 2 or More Views Right (Preliminary result)  Result time 08/24/22 23:50:56    ED Interpretation by Yas Sofia PA-C (08/24/22 23:50:56, [facility])    No fracture or foreign body noted                               Medications   LIDOcaine (PF) 10 mg/ml (1%) injection 50 mg (has no administration in time range)   Tdap (BOOSTRIX) vaccine injection 0.5 mL (0.5 mLs Intramuscular Given 8/24/22 2252)                 ED Course as of 08/24/22 7949   Wed Aug 24, 2022   4574  Reassessed patient at this time. Resting comfortably in the exam chair. Tolerated suture repair. Discussed diagosis and treatment plan with him. Instructed him to return to the ED in 10 days for suture removal. He verbalized understanding. Strict return precautions given. Stable for discharge.  [VJ]      ED Course User Index  [VJ] Yas Sofia PA-C             Clinical Impression:   Final diagnoses:  [S61.211A] Laceration of left index finger without foreign body without damage to nail, initial encounter (Primary)          ED Disposition Condition    Discharge Stable        ED Prescriptions     Medication Sig Dispense Start Date End Date Auth. Provider    sulfamethoxazole-trimethoprim 800-160mg (BACTRIM DS) 800-160 mg Tab Take 1 tablet by mouth 2 (two) times daily. for 7 days 14 tablet 8/24/2022 8/31/2022 Yas Sofia PA-C        Follow-up Information     Follow up With Specialties Details Why Contact Info    Amber Mittal MD Internal Medicine   [address] [place] [id number]  [phone]      [facility] Emergency Medicine In 3 days As needed, If symptoms worsen [address] [address] [phone]           Yas Sofia PA-C  08/24/22 9753       Yas Sofia PA-C  08/24/22 4317

## 2022-08-25 NOTE — DISCHARGE INSTRUCTIONS
Wash with antibacterial soap and water. Keep clean and dry. Return to the ED In 10 days for suture removal.

## 2022-08-25 NOTE — ED NOTES
Bed: FT 04  Expected date:   Expected time:   Means of arrival:   Comments:   Left voice mail for parent to call back.

## 2022-09-02 ENCOUNTER — HOSPITAL ENCOUNTER (EMERGENCY)
Facility: HOSPITAL | Age: 72
Discharge: HOME | End: 2022-09-02
Attending: STUDENT IN AN ORGANIZED HEALTH CARE EDUCATION/TRAINING PROGRAM
Payer: MEDICARE

## 2022-09-02 VITALS
SYSTOLIC BLOOD PRESSURE: 112 MMHG | HEIGHT: 69 IN | WEIGHT: 153 LBS | TEMPERATURE: 98 F | RESPIRATION RATE: 16 BRPM | BODY MASS INDEX: 22.66 KG/M2 | OXYGEN SATURATION: 98 % | DIASTOLIC BLOOD PRESSURE: 70 MMHG | HEART RATE: 54 BPM

## 2022-09-02 DIAGNOSIS — Z48.02 VISIT FOR SUTURE REMOVAL: Primary | ICD-10-CM

## 2022-09-02 PROCEDURE — 99281 EMR DPT VST MAYX REQ PHY/QHP: CPT

## 2022-09-03 NOTE — ED PROVIDER NOTES
Encounter Date: 9/2/2022       History     Chief Complaint   Patient presents with    Suture / Staple Removal     7 sutures in L index finger, placed 8/24/22     71-year-old male presenting today for stitches removal.  He had the stitches placed a week ago.  It is in his left index finger.  He has no other complaints at this time.    The history is provided by the patient. No  was used.   General Illness   The current episode started just prior to arrival. The problem occurs rarely. The problem has been unchanged. Nothing relieves the symptoms. Nothing aggravates the symptoms. Pertinent negatives include no fever, no abdominal pain, no nausea, no vomiting, no congestion, no headaches, no cough, no shortness of breath, no discharge and no eye redness.   Review of patient's allergies indicates:  No Known Allergies  Past Medical History:   Diagnosis Date    Cataract     CKD (chronic kidney disease)     DM (diabetes mellitus)     Hypertension      No past surgical history on file.  Family History   Problem Relation Age of Onset    Hypertension Father     Diabetes Brother      Social History     Tobacco Use    Smoking status: Never    Smokeless tobacco: Never   Substance Use Topics    Alcohol use: Not Currently    Drug use: Never     Review of Systems   Constitutional:  Negative for activity change, chills and fever.   HENT:  Negative for congestion and facial swelling.    Eyes:  Negative for discharge and redness.   Respiratory:  Negative for cough and shortness of breath.    Cardiovascular:  Negative for chest pain and leg swelling.   Gastrointestinal:  Negative for abdominal distention, abdominal pain, nausea and vomiting.   Genitourinary:  Negative for dysuria and hematuria.   Musculoskeletal:  Negative for gait problem and neck stiffness.   Skin:  Negative for color change and pallor.   Neurological:  Negative for dizziness and headaches.     Physical Exam     Initial Vitals [09/02/22 2201]   BP  Pulse Resp Temp SpO2   112/70 (!) 54 16 97.9 °F (36.6 °C) 98 %      MAP       --         Physical Exam    Nursing note and vitals reviewed.  Constitutional: He appears well-developed. He is not diaphoretic. No distress.   HENT:   Head: Normocephalic.   Eyes: Right eye exhibits no discharge. Left eye exhibits no discharge.   Neck: Neck supple.   Normal range of motion.  Cardiovascular:  Normal rate and regular rhythm.           Pulmonary/Chest: No respiratory distress. He has no wheezes.   Abdominal: Abdomen is soft. Bowel sounds are normal. He exhibits no distension. There is no abdominal tenderness. There is no rebound.   Musculoskeletal:         General: No tenderness. Normal range of motion.      Cervical back: Normal range of motion and neck supple.     Neurological: He is alert and oriented to person, place, and time.   Skin: Skin is warm and dry.   Sutures in place on left index finger.  No signs of infection at this time.       ED Course   Procedures  Labs Reviewed - No data to display       Imaging Results    None          Medications - No data to display  Medical Decision Making:   ED Management:  Pt presents to the ED with complaints of suture removal    Evaluated patient emergency department.  He was stable well appearing.  He was here for suture removals.  He had no signs of infection on the finger.  His sutures removed.  He is being discharged home stable condition at this time.                      Clinical Impression:   Final diagnoses:  [Z48.02] Visit for suture removal (Primary)      ED Disposition Condition    Discharge Stable          ED Prescriptions    None       Follow-up Information       Follow up With Specialties Details Why Contact Info    Amber Mittal MD Internal Medicine   [address]  [place] [address]  [phone]               Emeterio Osborn MD  09/02/22 9200

## 2022-11-28 ENCOUNTER — OFFICE VISIT (OUTPATIENT)
Dept: INTERNAL MEDICINE | Facility: CLINIC | Age: 72
End: 2022-11-28
Payer: MEDICARE

## 2022-11-28 VITALS
DIASTOLIC BLOOD PRESSURE: 72 MMHG | TEMPERATURE: 98 F | BODY MASS INDEX: 24.16 KG/M2 | WEIGHT: 163.13 LBS | RESPIRATION RATE: 18 BRPM | HEART RATE: 52 BPM | HEIGHT: 69 IN | SYSTOLIC BLOOD PRESSURE: 134 MMHG | OXYGEN SATURATION: 98 %

## 2022-11-28 DIAGNOSIS — I25.10 CORONARY ARTERY DISEASE INVOLVING NATIVE CORONARY ARTERY OF NATIVE HEART WITHOUT ANGINA PECTORIS: ICD-10-CM

## 2022-11-28 DIAGNOSIS — E11.9 TYPE 2 DIABETES MELLITUS WITHOUT COMPLICATION, WITHOUT LONG-TERM CURRENT USE OF INSULIN: ICD-10-CM

## 2022-11-28 DIAGNOSIS — Z23 NEED FOR VACCINATION: Primary | ICD-10-CM

## 2022-11-28 DIAGNOSIS — Z00.00 HEALTHCARE MAINTENANCE: ICD-10-CM

## 2022-11-28 DIAGNOSIS — I10 HYPERTENSION, UNSPECIFIED TYPE: ICD-10-CM

## 2022-11-28 DIAGNOSIS — I71.9 AORTIC ANEURYSM, UNSPECIFIED PORTION OF AORTA, UNSPECIFIED WHETHER RUPTURED: ICD-10-CM

## 2022-11-28 DIAGNOSIS — B00.52 HERPES SIMPLEX VIRUS (HSV) STROMAL KERATITIS OF LEFT EYE: ICD-10-CM

## 2022-11-28 DIAGNOSIS — E55.9 VITAMIN D DEFICIENCY: ICD-10-CM

## 2022-11-28 PROCEDURE — G0008 ADMIN INFLUENZA VIRUS VAC: HCPCS | Mod: PBBFAC

## 2022-11-28 PROCEDURE — 99214 OFFICE O/P EST MOD 30 MIN: CPT | Mod: PBBFAC,25

## 2022-11-28 RX ORDER — CHOLECALCIFEROL (VITAMIN D3) 25 MCG
1000 TABLET ORAL DAILY
COMMUNITY
End: 2023-03-21 | Stop reason: SDUPTHER

## 2022-11-28 RX ORDER — LINAGLIPTIN 5 MG/1
5 TABLET, FILM COATED ORAL DAILY
Qty: 90 TABLET | Refills: 2 | Status: SHIPPED | OUTPATIENT
Start: 2022-11-28 | End: 2023-03-21 | Stop reason: SDUPTHER

## 2022-11-28 RX ORDER — LISINOPRIL 20 MG/1
20 TABLET ORAL DAILY
Qty: 90 TABLET | Refills: 2 | Status: SHIPPED | OUTPATIENT
Start: 2022-11-28 | End: 2023-03-21 | Stop reason: SDUPTHER

## 2022-11-28 RX ORDER — AMLODIPINE BESYLATE 10 MG/1
10 TABLET ORAL DAILY
Qty: 90 TABLET | Refills: 3 | Status: SHIPPED | OUTPATIENT
Start: 2022-11-28 | End: 2023-03-21 | Stop reason: SDUPTHER

## 2022-11-28 NOTE — PROGRESS NOTES
[facility] INTERNAL MEDICINE  OUTPATIENT OFFICE VISIT NOTE    SUBJECTIVE:      Chief Complaint: Follow-up (Medication Refills. Flu Vaccine)       HPI: Duran Espinoza is a 72 y.o. yo male w/ PMH of  has a past medical history of Cataract, CKD (chronic kidney disease), DM (diabetes mellitus), and Hypertension., who presents for follow up and medication refill.    Today, patient reports he is doing well. States he injured his left index finger requiring stitches which has since healed. Denies any other complaints. Requesting refill of amlodipine, lisinopril, and tradjenta.      Past Medical History:   has a past medical history of Cataract, CKD (chronic kidney disease), DM (diabetes mellitus), and Hypertension.     Past Surgical History:   has no past surgical history on file.     Family History:  family history includes Diabetes in his brother; Hypertension in his father.     Social History:   reports that he has never smoked. He has never used smokeless tobacco. He reports that he does not currently use alcohol. He reports that he does not use drugs.     Allergies:  has No Known Allergies.     Home Medications:  Prior to Admission medications    Medication Sig Start Date End Date Taking? Authorizing Provider   ascorbic acid, vitamin C, (VITAMIN C) 1000 MG tablet Take 1,000 mg by mouth once daily. 10/4/21  Yes Historical Provider   aspirin (ECOTRIN) 81 MG EC tablet Take 81 mg by mouth once daily.   Yes Historical Provider   atorvastatin (LIPITOR) 40 MG tablet Take 40 mg by mouth once daily. 2/9/22  Yes Historical Provider   blood sugar diagnostic Strp Use to check blood sugar once a day 6/1/22  Yes Amber Mittal MD   metFORMIN (GLUCOPHAGE) 1000 MG tablet Take 1,000 mg by mouth 2 (two) times daily. 2/12/22  Yes Historical Provider   vitamin D (VITAMIN D3) 1000 units Tab Take 1,000 Units by mouth once daily.   Yes Historical Provider   amLODIPine (NORVASC) 10 MG tablet Take 10 mg by mouth once daily. for 90 days 4/11/22  "11/28/22 Yes Historical Provider   lisinopriL (PRINIVIL,ZESTRIL) 20 MG tablet Take 20 mg by mouth once daily. for 90 days 4/18/22 11/28/22 Yes Historical Provider   TRADJENTA 5 mg Tab tablet Take 5 mg by mouth once daily. 3/4/22 11/28/22 Yes Historical Provider   amLODIPine (NORVASC) 10 MG tablet Take 1 tablet (10 mg total) by mouth once daily. for 90 days 11/28/22 11/23/23  Lennie Casillas MD   lisinopriL (PRINIVIL,ZESTRIL) 20 MG tablet Take 1 tablet (20 mg total) by mouth once daily. for 90 days 11/28/22 8/25/23  Lennie Casillas MD   TRADJENTA 5 mg Tab tablet Take 1 tablet (5 mg total) by mouth once daily. 11/28/22 8/25/23  Lennie Casillas MD       ROS:  Constitutional: no fever, fatigue, weakness  Eye: no vision loss, eye redness, drainage, or pain  ENMT: no sore throat, ear pain, sinus pain/congestion, nasal congestion/drainage  Respiratory: no cough, no wheezing, no shortness of breath  Cardiovascular: no chest pain, no palpitations, no edema  Gastrointestinal: no nausea, vomiting, or diarrhea. No abdominal pain  Genitourinary: no dysuria, no urinary frequency or urgency, no hematuria  Hema/Lymph: no abnormal bruising or bleeding  Endocrine: no heat or cold intolerance, no excessive thirst or excessive urination  Musculoskeletal: no muscle or joint pain, left index finger paresthesia and tingling  Integumentary: no skin rash or abnormal lesion  Neurologic: no headache, no dizziness, no weakness or numbness         OBJECTIVE:     Vital signs:   BP (!) 144/79 (BP Location: Left arm, Patient Position: Sitting, BP Method: Large (Automatic))   Pulse (!) 52   Temp 98.4 °F (36.9 °C) (Oral)   Resp 18   Ht 5' 9.02" (1.753 m)   Wt 74 kg (163 lb 2.3 oz)   SpO2 98%   BMI 24.08 kg/m²      Physical Examination:  General: Patient well-appearing, in no distress   Eye: EOMI, clear conjunctiva, eyelids normal  HENT: Head-normocephalic and atraumatic  Neck: full range of motion  Respiratory: no increased WOB or respiratory " distress, breathing comfortably on room air  Cardiovascular: regular rate, 2+ radial  pulses  Gastrointestinal: soft, non-tender, non-distended  Musculoskeletal: full range of motion of all extremities/spine without limitation or discomfort, healed left index finger lesion  Integumentary: no rashes or skin lesions present  Neurologic: no signs of peripheral neurological deficit, motor/sensory function intact  Psychiatric:  alert and oriented, cognitive function intact, cooperative with exam, good eye contact, judgement and insight intact, mood and affect full range.    Diabetic Foot Exam: sensorimotor function intact, no ulcers or other foot lesions, thin calluses on foot dorsum more prominent at the heels, normal filament exam, 1+ DP pulses bilaterally, skin warm and dry      Labs:  CMP:   Lab Results   Component Value Date    GLUCOSE 131 (H) 06/13/2022    CALCIUM 9.5 06/13/2022    ALBUMIN 3.7 06/13/2022     06/13/2022    K 4.0 06/13/2022    CO2 25 06/13/2022    BUN 21.9 06/13/2022    CREATININE 1.09 06/13/2022    ALKPHOS 50 06/13/2022    ALT 14 06/13/2022    AST 14 06/13/2022    BILITOT 0.4 06/13/2022      CBC:   Lab Results   Component Value Date    WBC 4.4 (L) 11/05/2021    HGB 13.4 (L) 11/05/2021    HCT 42.0 11/05/2021    MCV 97.0 11/05/2021    RDW 13.2 11/05/2021     FLP:   Lab Results   Component Value Date    CHOL 137 11/23/2021    HDL 62 (H) 11/23/2021    LDL 68.00 11/23/2021    TRIG 37 11/23/2021    TOTALCHOLEST 2 11/23/2021     DM:   Lab Results   Component Value Date    HGBA1C 7.1 (H) 09/27/2021    .1 09/27/2021    CREATININE 1.09 06/13/2022    CREATRANDUR 117.3 06/13/2022    PROTEINURINE <6.8 06/13/2022     Anemia:   Lab Results   Component Value Date    IRON 105 02/27/2019    TIBC 318 02/27/2019    FERRITIN 86.6 02/27/2019    [id number] 293 09/29/2020    FOLATE 14.7 02/27/2019         ASSESSMENT & PLAN:     Aneurysm in ascending aorta- Stable  Possibly tertiary syphilis manifestation  4.6  cm on CT thorax in 9/2018  4.7 cm on CT thorax in 10/2019  echocardiogram 9/2018-EF 60%  advised on good control of BP (systolic goal 110-120)  following a Cardiologist in [place]. Last visit on 11/2021.  Per patient at 11/2022 [facility] visit, [facility] states aneurysm is stable per routine imaging with no indication for surgery. Will need to acquire records which is been quite difficult. Patient states he will call [facility] to inquire about his next appt and obtain physical records during his next visit.     Tertiary syphilis  Syphilis positive on 2/2021- dils 2, repeat on 6/2021 with dils 0  h/o Aortic aneurysm, possibly tertiary involvement  s/p 3x Bicillin injections completed    HTN  well controlled  cont Norvasc to 10 mg daily, lisinopril 20 mg daily  advised to keep a BP log, and to follow low salt diet    DM  Well controlled  urine microalbumin/creatinine wnl in 2019, 2020. Currently on ACEI  Continue on metformin to 1000 mg bid, Tradjenta 5mg daily.  diabetic eye exam- follows optho, last seen 8/8/2022  diabetic foot exam in clinic today 11/28/2022- as above  advised on maintaining a cbg log an following diabetic diet and daily exercise    CKD stage 2  At baseline poor PO water intake, has been encouraged to drink more every clinic visit  avoid nephrotoxic meds  follows in renal clinic, last seen 6/14/2022    HFpEF  echo 9/2018-EF 60%  cont aspirin, acei, statin  holding bb due to hx of bradycardia    Chronic mild leukopenia, 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 ethnic leukopenia   Negative HIV, hep panel    Normocytic anemia  Iron panel , B12 , folate wnl  Likely 2/2 CKD    Hypercalcemia-resolved  Normal BMP, vit D and PTH    Healthcare maintenance  Continue Aspirin 81 mg daily.    labs (CBC, CMP, FLP, A1c, TSH, vitD): ordered today 11/28/2022  HIV/Hep panel/syphilis: HIV/Hep neg in 10/2021, Syph reactive 6/2021 s/p treatment  Pneumococcal vaccine: UTD, PSV23 2021, PSV13 2016  Tdap: UTD, 2022  Zoster vaccine: UTD,  2021  Flu: UTD, 11/2022  FIT/colonoscopy: cologuard negative 7/2022  Lung cancer screening (LDCT age 50-80): na  AAA screening (men, age 65-75, smoking history): never smoker      Return to clinic in 3 month(s).      Lennie Casillas MD  [facility], -  11/28/2022

## 2022-12-01 ENCOUNTER — APPOINTMENT (OUTPATIENT)
Dept: LAB | Facility: HOSPITAL | Age: 72
End: 2022-12-01
Attending: NURSE PRACTITIONER
Payer: MEDICARE

## 2022-12-01 PROCEDURE — 84156 ASSAY OF PROTEIN URINE: CPT

## 2022-12-01 PROCEDURE — 81001 URINALYSIS AUTO W/SCOPE: CPT

## 2022-12-14 ENCOUNTER — OFFICE VISIT (OUTPATIENT)
Dept: NEPHROLOGY | Facility: CLINIC | Age: 72
End: 2022-12-14
Payer: MEDICARE

## 2022-12-14 VITALS
BODY MASS INDEX: 23.37 KG/M2 | WEIGHT: 157.81 LBS | OXYGEN SATURATION: 98 % | HEART RATE: 63 BPM | DIASTOLIC BLOOD PRESSURE: 82 MMHG | TEMPERATURE: 99 F | RESPIRATION RATE: 18 BRPM | SYSTOLIC BLOOD PRESSURE: 140 MMHG | HEIGHT: 69 IN

## 2022-12-14 DIAGNOSIS — H61.20 EXCESSIVE CERUMEN IN EAR CANAL, UNSPECIFIED LATERALITY: ICD-10-CM

## 2022-12-14 DIAGNOSIS — H91.90 HEARING LOSS, UNSPECIFIED HEARING LOSS TYPE, UNSPECIFIED LATERALITY: ICD-10-CM

## 2022-12-14 DIAGNOSIS — N18.2 CKD STAGE G2/A1, GFR 60-89 AND ALBUMIN CREATININE RATIO <30 MG/G: Primary | ICD-10-CM

## 2022-12-14 DIAGNOSIS — I10 PRIMARY HYPERTENSION: ICD-10-CM

## 2022-12-14 DIAGNOSIS — N39.41 URGE INCONTINENCE OF URINE: ICD-10-CM

## 2022-12-14 DIAGNOSIS — R35.0 URINARY FREQUENCY: ICD-10-CM

## 2022-12-14 PROCEDURE — 99215 OFFICE O/P EST HI 40 MIN: CPT | Mod: PBBFAC | Performed by: NURSE PRACTITIONER

## 2022-12-14 PROCEDURE — 99214 PR OFFICE/OUTPT VISIT, EST, LEVL IV, 30-39 MIN: ICD-10-PCS | Mod: S$PBB,,, | Performed by: NURSE PRACTITIONER

## 2022-12-14 PROCEDURE — 99214 OFFICE O/P EST MOD 30 MIN: CPT | Mod: S$PBB,,, | Performed by: NURSE PRACTITIONER

## 2022-12-14 RX ORDER — CIPROFLOXACIN 500 MG/1
500 TABLET ORAL 2 TIMES DAILY
Qty: 28 TABLET | Refills: 0 | Status: SHIPPED | OUTPATIENT
Start: 2022-12-14 | End: 2022-12-28

## 2022-12-14 RX ORDER — TAMSULOSIN HYDROCHLORIDE 0.4 MG/1
0.4 CAPSULE ORAL DAILY
Qty: 90 CAPSULE | Refills: 3 | Status: SHIPPED | OUTPATIENT
Start: 2022-12-14 | End: 2023-03-21 | Stop reason: SDUPTHER

## 2022-12-14 NOTE — PROGRESS NOTES
"[facility]  Nephrology Clinic Note    Chief complaint: Chronic Kidney Disease (RTC, took meds, c/o left ear hearing loss for 2 days-maybe needs cleaning, feels like water inside)    History of present illness:   Duran Espinoza is a 72 y.o. Black or  male with past medical history of chronic kidney disease, diabetes, hypertension, aortic aneurysm, and cataract.  Patient presents for follow-up appointment in Nephrology Clinic today, c/o left sided hearing loss, urinary frequency, urgency and occasional incontinence.     Review of Systems  12 point review of systems conducted, negative except as stated in the history of present illness.    Allergies: Patient has No Known Allergies.     Past Medical History:  has a past medical history of Cataract, CKD (chronic kidney disease), DM (diabetes mellitus), and Hypertension.    Procedure History:  has a past surgical history that includes stitches (09/14/2022).    Family History: family history includes Diabetes in his brother; Hypertension in his father.    Social History:  reports that he has never smoked. He has never used smokeless tobacco. He reports that he does not currently use alcohol. He reports that he does not use drugs.    Physical exam  BP (!) 140/82 (BP Location: Right arm, Patient Position: Sitting, BP Method: Medium (Manual))   Pulse 63   Temp 98.6 °F (37 °C) (Oral)   Resp 18   Ht 5' 8.9" (1.75 m)   Wt 71.6 kg (157 lb 12.8 oz)   SpO2 98%   BMI 23.37 kg/m²   General appearance: Patient is in no acute distress.  Skin: No rashes or wounds.  HEENT: PERRLA, EOMI, no scleral icterus, no JVD. Neck is supple. + ear wax in the canal, unable to visualize TMs  Chest: Respirations are unlabored. Lungs sounds are clear.   Heart: S1, S2.   Abdomen: Benign.  : Deferred.  Extremities: No edema, peripheral pulses are palpable.   Neuro: No focal deficits.     Home Medications:    Current Outpatient Medications:     " amLODIPine (NORVASC) 10 MG tablet, Take 1 tablet (10 mg total) by mouth once daily. for 90 days, Disp: 90 tablet, Rfl: 3    ascorbic acid, vitamin C, (VITAMIN C) 1000 MG tablet, Take 1,000 mg by mouth once daily., Disp: , Rfl:     aspirin (ECOTRIN) 81 MG EC tablet, Take 81 mg by mouth once daily., Disp: , Rfl:     atorvastatin (LIPITOR) 40 MG tablet, Take 40 mg by mouth once daily., Disp: , Rfl:     blood sugar diagnostic Strp, Use to check blood sugar once a day, Disp: 100 each, Rfl: 3    lisinopriL (PRINIVIL,ZESTRIL) 20 MG tablet, Take 1 tablet (20 mg total) by mouth once daily. for 90 days, Disp: 90 tablet, Rfl: 2    metFORMIN (GLUCOPHAGE) 1000 MG tablet, Take 1,000 mg by mouth 2 (two) times daily., Disp: , Rfl:     TRADJENTA 5 mg Tab tablet, Take 1 tablet (5 mg total) by mouth once daily., Disp: 90 tablet, Rfl: 2    vitamin D (VITAMIN D3) 1000 units Tab, Take 1,000 Units by mouth once daily., Disp: , Rfl:     ciprofloxacin HCl (CIPRO) 500 MG tablet, Take 1 tablet (500 mg total) by mouth 2 (two) times daily. for 14 days, Disp: 28 tablet, Rfl: 0    tamsulosin (FLOMAX) 0.4 mg Cap, Take 1 capsule (0.4 mg total) by mouth once daily., Disp: 90 capsule, Rfl: 3    Laboratory data    Hematology  Lab Results   Component Value Date    WBC 4.5 11/28/2022    HGB 12.6 (L) 11/28/2022    HCT 38.4 (L) 11/28/2022     11/28/2022     Chemistry  Lab Results   Component Value Date     11/28/2022    K 4.2 11/28/2022    CHLORIDE 108 (H) 11/28/2022    BUN 18.9 11/28/2022    CREATININE 1.07 11/28/2022    EGFRNORACEVR >60 11/28/2022    GLUCOSE 134 (H) 11/28/2022    CALCIUM 10.0 11/28/2022    ALKPHOS 50 11/28/2022    LABPROT 7.0 11/28/2022    ALBUMIN 3.8 11/28/2022    BILIDIR 0.2 11/05/2021    IBILI 0.20 11/05/2021    AST 14 11/28/2022    ALT 16 11/28/2022    MG 2.1 07/21/2020    PHOS 3.1 11/05/2021      Urine:  Lab Results   Component Value Date    COLORUA Colorless (A) 12/01/2022    APPEARANCEUA Clear 12/01/2022    SGUA  1.004 12/01/2022    PHUA 6.0 12/01/2022    PROTEINUA Negative 12/01/2022    GLUCOSEUA Normal 12/01/2022    KETONESUA Negative 12/01/2022    BLOODUA Negative 12/01/2022    NITRITESUA 1+ (A) 12/01/2022    LEUKOCYTESUR Negative 12/01/2022    RBCUA 0-5 12/01/2022    WBCUA 0-5 12/01/2022    BACTERIA None Seen 12/01/2022    SQEPUA None Seen 12/01/2022    HYALINECASTS None Seen 12/01/2022    CREATRANDUR 16.7 (L) 12/01/2022    PROTEINURINE <6.8 12/01/2022         Lab Results   Component Value Date    HGBA1C 6.9 11/28/2022    GLUCOSE 134 (H) 11/28/2022     Lab Results   Component Value Date    PTH 69.7 10/04/2021    UCQFEGFX61EN 42.2 11/28/2022       Lab Results   Component Value Date    IRON 105 02/27/2019    TIBC 318 02/27/2019    TRANS 262.0 02/27/2019    LABIRON 33.0 02/27/2019    FERRITIN 86.6 02/27/2019    FOLATE 14.7 02/27/2019    WBPVCAMM33 293 09/29/2020       Lab Results   Component Value Date    HIV Nonreactive 10/04/2021    HEPCAB Nonreactive 10/04/2021    HEPBSURFAG Nonreactive 10/04/2021         Imaging  US Retroperitoneal Limited 08/23/2018    Narrative  Retroperitoneal ultrasound    Clinical history is chronic kidney disease, renal cyst.    This is compared to a CT scan of the chest from 5/1/2018    The right kidney measures 9.4 x 4.5 x 6.6 cm. There is a cortical cyst  present measuring 3.3 x 2.8 x 3.2 cm. There is no hydronephrosis.    The left kidney measures 12.8 x 5.8 x 6.1 cm. There are multiple cyst  present. The 2 largest measures 4.6 x 4.4 x 4.1 cm and 3.2 x 3.8 x 4.6  cm. There is no hydronephrosis.    IMPRESSION: Bilateral renal cyst.      Electronically Signed By: Nguyen JUÁREZ, Salbaodr KING  Date/Time Signed: 08/23/2018 13:21      Impression and plan    CKD stage G2/A1, GFR 60-89 and albumin creatinine ratio <30 mg/g  Renal indices are stable. Continue risk factor management and periodic monitoring, rtc in 6 months.     Primary hypertension  Blood pressure reading is at goal, continue current  antihypertensive regimen and 2 g a day dietary sodium restriction.      BMI 24.0-24.9, adult  Continue well balanced diet.     Urinary frequency  -     ciprofloxacin HCl (CIPRO) 500 MG tablet; Take 1 tablet (500 mg total) by mouth 2 (two) times daily. for 14 days  Dispense: 28 tablet; Refill: 0  -     tamsulosin (FLOMAX) 0.4 mg Cap; Take 1 capsule (0.4 mg total) by mouth once daily.  Dispense: 90 capsule; Refill: 3  -     Ambulatory referral/consult to Urology; Future; Expected date: 01/14/2023  Will start an alpha blocker, treat UTI empirically with Cipro, and refer to Urology.     Urge incontinence of urine  -     Ambulatory referral/consult to Urology; Future; Expected date: 01/14/2023  Will start an alpha blocker, treat UTI empirically with Cipro, and refer to Urology.     Hearing loss, unspecified hearing loss type, unspecified laterality  -     Ambulatory referral/consult to ENT; Future; Expected date: 12/28/2022  Will refer to ENT.    Excessive cerumen in ear canal, unspecified laterality  -     Ambulatory referral/consult to ENT; Future; Expected date: 12/28/2022  Will refer to ENT.      12/14/2022  Gracie Rodriguez NP  [facility] Nephrology

## 2022-12-28 ENCOUNTER — OFFICE VISIT (OUTPATIENT)
Dept: OTOLARYNGOLOGY | Facility: CLINIC | Age: 72
End: 2022-12-28
Payer: MEDICARE

## 2022-12-28 VITALS — SYSTOLIC BLOOD PRESSURE: 152 MMHG | TEMPERATURE: 99 F | DIASTOLIC BLOOD PRESSURE: 86 MMHG | HEART RATE: 53 BPM

## 2022-12-28 DIAGNOSIS — H61.20 EXCESSIVE CERUMEN IN EAR CANAL, UNSPECIFIED LATERALITY: ICD-10-CM

## 2022-12-28 DIAGNOSIS — H61.23 BILATERAL HEARING LOSS DUE TO CERUMEN IMPACTION: ICD-10-CM

## 2022-12-28 PROCEDURE — 69210 REMOVE IMPACTED EAR WAX UNI: CPT | Mod: PBBFAC | Performed by: OTOLARYNGOLOGY

## 2022-12-28 PROCEDURE — 99214 OFFICE O/P EST MOD 30 MIN: CPT | Mod: PBBFAC | Performed by: OTOLARYNGOLOGY

## 2022-12-28 NOTE — PROGRESS NOTES
Patient Name: Duran Espinoza   YOB: 1950     Chief Complaint:   Chief Complaint   Patient presents with    referral: Cerumen Impaction / Hearing Loss        History of Present Illness:  December 28, 2022:   72-year-old male referred for evaluation of hearing loss and cerumen impaction.  Proximally 1 year ago the patient had temporary loss of hearing in his right ear which she described as up sensation.  After a period of time hearing loss resolved any pain no further attention to it.  Approximately 4-5 weeks ago he developed similar symptoms in his left ear and this also subsequently improved without any intervention.  He discuss this with his nephrologist who is seen on December 14th 2022, and upon examination he was noted to have bilateral cerumen impactions.  He does not use Q-tips to clean his ears.  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  He does not have any associated ear pain or otorrhea.  He feels his hearing is fairly good otherwise.  No other complaints today.    Past Medical History:  Past Medical History:   Diagnosis Date    Cataract     CKD (chronic kidney disease)     DM (diabetes mellitus)     Hypertension      Past Surgical History:   Procedure Laterality Date    stitches  09/14/2022    left fore finger       Review of Systems:  Unremarkable except as mentioned above.    Current Medications:  Current Outpatient Medications   Medication Sig    amLODIPine (NORVASC) 10 MG tablet Take 1 tablet (10 mg total) by mouth once daily. for 90 days    ascorbic acid, vitamin C, (VITAMIN C) 1000 MG tablet Take 1,000 mg by mouth once daily.    aspirin (ECOTRIN) 81 MG EC tablet Take 81 mg by mouth once daily.    atorvastatin (LIPITOR) 40 MG tablet Take 40 mg by mouth once daily.    blood sugar diagnostic Strp Use to check blood sugar once a day    ciprofloxacin HCl (CIPRO) 500 MG tablet Take 1 tablet (500 mg total) by mouth 2 (two) times daily. for 14  days    lisinopriL (PRINIVIL,ZESTRIL) 20 MG tablet Take 1 tablet (20 mg total) by mouth once daily. for 90 days    metFORMIN (GLUCOPHAGE) 1000 MG tablet Take 1,000 mg by mouth 2 (two) times daily.    tamsulosin (FLOMAX) 0.4 mg Cap Take 1 capsule (0.4 mg total) by mouth once daily.    TRADJENTA 5 mg Tab tablet Take 1 tablet (5 mg total) by mouth once daily.    vitamin D (VITAMIN D3) 1000 units Tab Take 1,000 Units by mouth once daily.     No current facility-administered medications for this visit.        Allergies:  Review of patient's allergies indicates:  No Known Allergies     Physical Exam:  Vital signs:   Vitals:    12/28/22 0901 12/28/22 0905   BP: (!) 164/83 (!) 152/86   BP Location: Right arm Right arm   Patient Position: Sitting Sitting   BP Method: Large (Automatic) Large (Manual)   Pulse: (!) 53    Temp: 98.7 °F (37.1 °C)    TempSrc: Oral    General:  Well-developed well-nourished male in no acute distress.  Voice is normal.  Head and face:  Normocephalic.  No facial lesions.  No temporomandibular joint tenderness or click.  Ears: (microscopic exam) in both ears the patient does have dense cerumen impactions completely obstructing the ear canals.  These were removed using a combination of curette and alligator forceps with subsequent improvement in patient's hearing and fullness.  Canals are otherwise unremarkable.  Tympanic membranes nonerythematous.  No middle ear effusions.  Auricles are developed normally bilaterally.  Eyes:  Extraocular muscles intact.  No nystagmus.  No exophthalmos or enophthalmos.  Neurologic:  Alert and oriented.  Cranial nerves 2-12 are grossly normal.      Assessment/Plan:  Bilateral temporary hearing loss secondary to cerumen impaction-resolved.      Plan:  Recommended to the patient that he use ear muffs or some other type of hearing protection on a routine basis while working.  Patient will be seen for follow-up p.r.n..      Frankie Jovel M.D.

## 2023-02-02 ENCOUNTER — LAB VISIT (OUTPATIENT)
Dept: LAB | Facility: HOSPITAL | Age: 73
End: 2023-02-02
Attending: NURSE PRACTITIONER
Payer: MEDICARE

## 2023-02-02 ENCOUNTER — OFFICE VISIT (OUTPATIENT)
Dept: UROLOGY | Facility: CLINIC | Age: 73
End: 2023-02-02
Payer: MEDICARE

## 2023-02-02 VITALS
HEART RATE: 56 BPM | HEIGHT: 69 IN | SYSTOLIC BLOOD PRESSURE: 142 MMHG | BODY MASS INDEX: 23.73 KG/M2 | DIASTOLIC BLOOD PRESSURE: 79 MMHG | WEIGHT: 160.19 LBS | RESPIRATION RATE: 18 BRPM | OXYGEN SATURATION: 99 % | TEMPERATURE: 98 F

## 2023-02-02 DIAGNOSIS — N40.1 BENIGN PROSTATIC HYPERPLASIA WITH URINARY FREQUENCY: ICD-10-CM

## 2023-02-02 DIAGNOSIS — R35.0 URINARY FREQUENCY: ICD-10-CM

## 2023-02-02 DIAGNOSIS — N39.41 URGE INCONTINENCE OF URINE: ICD-10-CM

## 2023-02-02 DIAGNOSIS — R35.0 BENIGN PROSTATIC HYPERPLASIA WITH URINARY FREQUENCY: ICD-10-CM

## 2023-02-02 LAB — PSA SERPL-MCNC: 2.44 NG/ML

## 2023-02-02 PROCEDURE — 84153 ASSAY OF PSA TOTAL: CPT

## 2023-02-02 PROCEDURE — 99215 OFFICE O/P EST HI 40 MIN: CPT | Mod: PBBFAC | Performed by: NURSE PRACTITIONER

## 2023-02-02 PROCEDURE — 99213 PR OFFICE/OUTPT VISIT, EST, LEVL III, 20-29 MIN: ICD-10-PCS | Mod: S$PBB,,, | Performed by: NURSE PRACTITIONER

## 2023-02-02 PROCEDURE — 36415 COLL VENOUS BLD VENIPUNCTURE: CPT

## 2023-02-02 PROCEDURE — 99213 OFFICE O/P EST LOW 20 MIN: CPT | Mod: S$PBB,,, | Performed by: NURSE PRACTITIONER

## 2023-02-02 NOTE — PROGRESS NOTES
Chief Complaint:   Chief Complaint   Patient presents with    Urinary Incontinence       HPI: Duran Espinoza is a 72 y.o. male referred to Urology for urinary incontinence and frequency.  Patient has past medical history of chronic kidney disease, diabetes, hypertension, aortic aneurysm.  Patient seen by PCP in placed on tamsulosin 0.4 mg p.o. daily for urinary frequency, urgency and occasional incontinence. Patient urine stream improved, denies hesitancy, straining, interruption of stream, dribbling, frequency, nocturia. NABOR: As described below. Family history of urologic pathology & personal history of stones.     PSA now, continue Flomax, F/U 6 months with PSA. Instructed patient on Timed voiding every 2-3 hours, double voiding, avoiding bladder irritants, such as alcohol, citrus foods & drinks, caffeine drinks energy drinks, spicy foods, sodas, increase water intake.  Instructed patient to avoid drinking fluid 2 hours prior to bedtime and throughout the night along with voiding immediately prior to bedtime.    Allergies:  Review of patient's allergies indicates:  No Known Allergies    Medications:  Current Outpatient Medications   Medication Sig Dispense Refill    amLODIPine (NORVASC) 10 MG tablet Take 1 tablet (10 mg total) by mouth once daily. for 90 days 90 tablet 3    ascorbic acid, vitamin C, (VITAMIN C) 1000 MG tablet Take 1,000 mg by mouth once daily.      aspirin (ECOTRIN) 81 MG EC tablet Take 81 mg by mouth once daily.      atorvastatin (LIPITOR) 40 MG tablet Take 40 mg by mouth once daily.      blood sugar diagnostic Strp Use to check blood sugar once a day 100 each 3    lisinopriL (PRINIVIL,ZESTRIL) 20 MG tablet Take 1 tablet (20 mg total) by mouth once daily. for 90 days 90 tablet 2    metFORMIN (GLUCOPHAGE) 1000 MG tablet Take 1,000 mg by mouth 2 (two) times daily.      tamsulosin (FLOMAX) 0.4 mg Cap Take 1 capsule (0.4 mg total) by mouth once daily. 90 capsule 3    TRADJENTA 5 mg Tab tablet Take 1  tablet (5 mg total) by mouth once daily. 90 tablet 2    vitamin D (VITAMIN D3) 1000 units Tab Take 1,000 Units by mouth once daily.       No current facility-administered medications for this visit.       Review of Systems:  General: No fever, chills, fatigability, or weight loss.  Skin: No rashes, itching, or changes in color or texture of skin.  Chest: Denies HYMAN, cyanosis, wheezing, cough, and sputum production.  Abdomen: Appetite fine. No weight loss. Denies diarrhea, abdominal pain, hematemesis, or blood in stool.  Musculoskeletal: No joint stiffness or swelling. Denies back pain.  : As above.  All other review of systems negative.    PMH:  Past Medical History:   Diagnosis Date    Cataract     CKD (chronic kidney disease)     DM (diabetes mellitus)     Hypertension        PSH:  Past Surgical History:   Procedure Laterality Date    stitches  09/14/2022    left fore finger       FamHx:  Family History   Problem Relation Age of Onset    Hypertension Father     Diabetes Brother        SocHx:  Social History     Socioeconomic History    Marital status:     Number of children: 2   Tobacco Use    Smoking status: Never    Smokeless tobacco: Never   Substance and Sexual Activity    Alcohol use: Not Currently    Drug use: Never    Sexual activity: Not Currently     Social Determinants of Health     Financial Resource Strain: Low Risk     Difficulty of Paying Living Expenses: Not hard at all   Food Insecurity: No Food Insecurity    Worried About Running Out of Food in the Last Year: Never true    Ran Out of Food in the Last Year: Never true   Transportation Needs: No Transportation Needs    Lack of Transportation (Medical): No    Lack of Transportation (Non-Medical): No   Physical Activity: Inactive    Days of Exercise per Week: 0 days    Minutes of Exercise per Session: 0 min   Stress: No Stress Concern Present    Feeling of Stress : Not at all   Social Connections: Moderately Integrated    Frequency of  Communication with Friends and Family: More than three times a week    Frequency of Social Gatherings with Friends and Family: Never    Attends Holiness Services: More than 4 times per year    Active Member of Clubs or Organizations: No    Attends Club or Organization Meetings: Never    Marital Status:    Housing Stability: Low Risk     Unable to Pay for Housing in the Last Year: No    Number of Places Lived in the Last Year: 1    Unstable Housing in the Last Year: No       Physical Exam:  Vitals:    02/02/23 0920   BP: (!) 142/79   Pulse:    Resp:    Temp:      General: A&Ox3, no apparent distress, no deformities  Neck: No masses, normal thyroid  Lungs: CTA angela, no use of accessory muscles  Heart: RRR, no arrhythmias  Abdomen: Soft, NT, ND, no masses, no hernias, no hepatosplenomegaly  Lymphatic: Neck and groin nodes negative  Skin: The skin is warm and dry. No jaundice.  Ext: No c/c/e.    GUMale: Test desc angela, no abnormalities of epididymus. Penis circumcised, with normal penile and scrotal skin. Meatus normal. Normal rectal tone, no hemorrhoids. Prostate: 2 finger breadth wide, smooth, soft, nontender, no nodules or masses appreciated. SV not palpable. Perineum and anus normal.        Impression: BPH      Plan: Instructed patient continue tamsulosin 0.4 mg p.o. daily.  Instructed patient on timed voiding every 2-3 hrs, double voiding, avoidance of bladder irritants such as alcohol, citrus foods, chocolate, caffeinated drinks, energy drinks, spicy foods, sugar, caffeine products, sodas. Instructed patient to avoid drinking fluids 1-2 hours prior to bedtime & void immediately before bedtime.

## 2023-02-02 NOTE — PROGRESS NOTES
Patient seen by Jaime Stanford NP. PSA to be done now and RTC in 6 months with repeat PSA. Discharge instructions given verbal and written.

## 2023-02-09 ENCOUNTER — OFFICE VISIT (OUTPATIENT)
Dept: OPHTHALMOLOGY | Facility: CLINIC | Age: 73
End: 2023-02-09
Payer: MEDICARE

## 2023-02-09 VITALS — HEIGHT: 69 IN | WEIGHT: 160 LBS | BODY MASS INDEX: 23.7 KG/M2

## 2023-02-09 DIAGNOSIS — E11.319 DIABETIC RETINOPATHY OF BOTH EYES WITHOUT MACULAR EDEMA ASSOCIATED WITH TYPE 2 DIABETES MELLITUS, UNSPECIFIED RETINOPATHY SEVERITY: Primary | ICD-10-CM

## 2023-02-09 DIAGNOSIS — B00.52 HERPES SIMPLEX VIRUS (HSV) STROMAL KERATITIS OF LEFT EYE: ICD-10-CM

## 2023-02-09 DIAGNOSIS — H25.813 COMBINED FORM OF AGE-RELATED CATARACT, BOTH EYES: ICD-10-CM

## 2023-02-09 DIAGNOSIS — H35.412 LATTICE DEGENERATION OF LEFT RETINA: ICD-10-CM

## 2023-02-09 DIAGNOSIS — H43.812 PVD (POSTERIOR VITREOUS DETACHMENT), LEFT: ICD-10-CM

## 2023-02-09 PROCEDURE — 99213 OFFICE O/P EST LOW 20 MIN: CPT | Mod: PBBFAC,PO

## 2023-02-09 NOTE — PROGRESS NOTES
HPI     Diabetic Eye Exam     Additional comments: DFE          Last edited by Sharla Juan RN on 2/9/2023  7:42 AM.            Assessment /Plan     For exam results, see Encounter Report.    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 Hx of Viral Keratitis, left eye   - Presented with corneal edema and D folds. 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  Pts PCP notified.   - Started on acyclovir 400mg PO 5x daily   - Interval exam 3/4/22: VA improved to 20/25. No recurrent corneal edema. AC quiet. IOP wnl. Patient remains asymptomatic.   - 5/6/22: Patient denies symptoms. Using PF once daily. No evidence of recurrence at this time.   - Off acyclovir and pred forte   - PFATs 4-6x daily   - Counseled patient on return precautions     2. H/o Corneal Ulcer with metallic FB OS     - Stable, 2x2 mm stromal scar with minimal corneal thinning without epi defect or infiltrate     - Stressed using eye protection       3. Combined form of age-related cataract, both eyes   - Approaching VS, eval next visit    - Note, will likely need course of acyclovir pre-operatively to prevent HSV re-activation as above       4. DM without retinopathy    - Well controlled, on metformin, stressed BS/BP control    - A1c 7.1%, no retinopathy on examination.     - CTM w/ annual DFE (due 02/2024)    - monitor    5. Lattice Degen,OS  - Large area sup, no h/t/d   - CTM     6. PVD, OS  - No sx  - RD precautions discussed   - CTM      RTC 1yr for Mrx/BAT, annual DFE,or sooner PRN

## 2023-03-21 ENCOUNTER — OFFICE VISIT (OUTPATIENT)
Dept: INTERNAL MEDICINE | Facility: CLINIC | Age: 73
End: 2023-03-21
Payer: MEDICARE

## 2023-03-21 VITALS
OXYGEN SATURATION: 100 % | TEMPERATURE: 99 F | RESPIRATION RATE: 18 BRPM | HEART RATE: 58 BPM | WEIGHT: 166.81 LBS | BODY MASS INDEX: 24.63 KG/M2 | SYSTOLIC BLOOD PRESSURE: 122 MMHG | DIASTOLIC BLOOD PRESSURE: 72 MMHG

## 2023-03-21 DIAGNOSIS — E11.9 TYPE 2 DIABETES MELLITUS WITHOUT COMPLICATION, WITHOUT LONG-TERM CURRENT USE OF INSULIN: ICD-10-CM

## 2023-03-21 DIAGNOSIS — I10 HYPERTENSION, UNSPECIFIED TYPE: ICD-10-CM

## 2023-03-21 DIAGNOSIS — D64.9 NORMOCYTIC ANEMIA: Primary | ICD-10-CM

## 2023-03-21 DIAGNOSIS — I71.9 AORTIC ANEURYSM, UNSPECIFIED PORTION OF AORTA, UNSPECIFIED WHETHER RUPTURED: ICD-10-CM

## 2023-03-21 DIAGNOSIS — I25.10 CORONARY ARTERY DISEASE INVOLVING NATIVE CORONARY ARTERY OF NATIVE HEART WITHOUT ANGINA PECTORIS: ICD-10-CM

## 2023-03-21 DIAGNOSIS — R35.0 URINARY FREQUENCY: ICD-10-CM

## 2023-03-21 PROCEDURE — 99213 OFFICE O/P EST LOW 20 MIN: CPT | Mod: PBBFAC

## 2023-03-21 RX ORDER — INSULIN PUMP SYRINGE, 3 ML
EACH MISCELLANEOUS
COMMUNITY

## 2023-03-21 RX ORDER — ASPIRIN 81 MG/1
81 TABLET ORAL DAILY
Qty: 90 TABLET | Refills: 3 | Status: SHIPPED | OUTPATIENT
Start: 2023-03-21 | End: 2024-03-20

## 2023-03-21 RX ORDER — LINAGLIPTIN 5 MG/1
5 TABLET, FILM COATED ORAL DAILY
Qty: 90 TABLET | Refills: 3 | Status: SHIPPED | OUTPATIENT
Start: 2023-03-21 | End: 2024-03-26 | Stop reason: SDUPTHER

## 2023-03-21 RX ORDER — ATORVASTATIN CALCIUM 40 MG/1
40 TABLET, FILM COATED ORAL DAILY
Qty: 90 TABLET | Refills: 3 | Status: SHIPPED | OUTPATIENT
Start: 2023-03-21 | End: 2024-03-20

## 2023-03-21 RX ORDER — METFORMIN HYDROCHLORIDE 1000 MG/1
1000 TABLET ORAL 2 TIMES DAILY
Qty: 180 TABLET | Refills: 3 | Status: SHIPPED | OUTPATIENT
Start: 2023-03-21 | End: 2024-03-20

## 2023-03-21 RX ORDER — IBUPROFEN 100 MG/5ML
1000 SUSPENSION, ORAL (FINAL DOSE FORM) ORAL DAILY
Qty: 30 TABLET | Refills: 5 | Status: SHIPPED | OUTPATIENT
Start: 2023-03-21 | End: 2023-09-17

## 2023-03-21 RX ORDER — AMLODIPINE BESYLATE 10 MG/1
10 TABLET ORAL DAILY
Qty: 90 TABLET | Refills: 3 | Status: SHIPPED | OUTPATIENT
Start: 2023-03-21 | End: 2024-04-02 | Stop reason: SDUPTHER

## 2023-03-21 RX ORDER — LISINOPRIL 20 MG/1
20 TABLET ORAL DAILY
Qty: 90 TABLET | Refills: 3 | Status: SHIPPED | OUTPATIENT
Start: 2023-03-21 | End: 2023-10-17 | Stop reason: SDUPTHER

## 2023-03-21 RX ORDER — CHOLECALCIFEROL (VITAMIN D3) 25 MCG
1000 TABLET ORAL DAILY
Qty: 90 TABLET | Refills: 3 | Status: SHIPPED | OUTPATIENT
Start: 2023-03-21 | End: 2024-03-20

## 2023-03-21 RX ORDER — TAMSULOSIN HYDROCHLORIDE 0.4 MG/1
0.4 CAPSULE ORAL DAILY
Qty: 90 CAPSULE | Refills: 3 | Status: SHIPPED | OUTPATIENT
Start: 2023-03-21 | End: 2023-08-15 | Stop reason: SDUPTHER

## 2023-03-21 NOTE — PROGRESS NOTES
[facility] INTERNAL MEDICINE  OUTPATIENT OFFICE VISIT NOTE    SUBJECTIVE:      Chief Complaint: Follow-up (Medication Refill- Flomax- at [facility])       HPI: Duran Espinoza is a 72 y.o. yo male w/ PMH of  has a past medical history of Cataract, CKD (chronic kidney disease), DM (diabetes mellitus), and Hypertension., who presents for follow up and medication refill.    Today, patient reports he is doing well. States he plans on scheduling an appointment with [facility] for monitoring of his ascending aortic aneurysm. Patient also requested Flomax and Tradjenta be sent to [facility] pharmacy due to cost, while other prescriptions are went to [facility] in [place].    Past Medical History:   has a past medical history of Cataract, CKD (chronic kidney disease), DM (diabetes mellitus), and Hypertension.     Past Surgical History:   has a past surgical history that includes stitches (09/14/2022).     Family History:  family history includes Diabetes in his brother; Hypertension in his father.     Social History:   reports that he has never smoked. He has never used smokeless tobacco. He reports that he does not currently use alcohol. He reports that he does not use drugs.     Allergies:  has No Known Allergies.     Home Medications:  Prior to Admission medications    Medication Sig Start Date End Date Taking? Authorizing Provider   ascorbic acid, vitamin C, (VITAMIN C) 1000 MG tablet Take 1,000 mg by mouth once daily. 10/4/21  Yes Historical Provider   aspirin (ECOTRIN) 81 MG EC tablet Take 81 mg by mouth once daily.   Yes Historical Provider   atorvastatin (LIPITOR) 40 MG tablet Take 40 mg by mouth once daily. 2/9/22  Yes Historical Provider   blood sugar diagnostic Strp Use to check blood sugar once a day 6/1/22  Yes Amber Mittal MD   metFORMIN (GLUCOPHAGE) 1000 MG tablet Take 1,000 mg by mouth 2 (two) times daily. 2/12/22  Yes Historical Provider   vitamin D (VITAMIN D3) 1000 units Tab Take 1,000 Units by mouth once daily.   Yes  Historical Provider   amLODIPine (NORVASC) 10 MG tablet Take 10 mg by mouth once daily. for 90 days 4/11/22 11/28/22 Yes Historical Provider   lisinopriL (PRINIVIL,ZESTRIL) 20 MG tablet Take 20 mg by mouth once daily. for 90 days 4/18/22 11/28/22 Yes Historical Provider   TRADJENTA 5 mg Tab tablet Take 5 mg by mouth once daily. 3/4/22 11/28/22 Yes Historical Provider   amLODIPine (NORVASC) 10 MG tablet Take 1 tablet (10 mg total) by mouth once daily. for 90 days 11/28/22 11/23/23  Lennie Casillas MD   lisinopriL (PRINIVIL,ZESTRIL) 20 MG tablet Take 1 tablet (20 mg total) by mouth once daily. for 90 days 11/28/22 8/25/23  Lennie Casillas MD   TRADJENTA 5 mg Tab tablet Take 1 tablet (5 mg total) by mouth once daily. 11/28/22 8/25/23  eLnnie Casillas MD       ROS:  Constitutional: no fever, fatigue, weakness  Eye: no vision loss, eye redness, drainage, or pain  ENMT: no sore throat, ear pain, sinus pain/congestion, nasal congestion/drainage  Respiratory: no cough, no wheezing, no shortness of breath  Cardiovascular: no chest pain, no palpitations, no edema  Gastrointestinal: no nausea, vomiting, or diarrhea. No abdominal pain  Genitourinary: no dysuria, no urinary frequency or urgency, no hematuria  Hema/Lymph: no abnormal bruising or bleeding  Endocrine: no heat or cold intolerance, no excessive thirst or excessive urination  Musculoskeletal: no muscle or joint pain, left index finger paresthesia and tingling  Integumentary: no skin rash or abnormal lesion  Neurologic: no headache, no dizziness, no weakness or numbness         OBJECTIVE:     Vital signs:   /72 (BP Location: Left arm, Patient Position: Sitting, BP Method: Medium (Automatic))   Pulse (!) 58   Temp 98.6 °F (37 °C) (Oral)   Resp 18   Wt 75.7 kg (166 lb 12.8 oz)   SpO2 100%   BMI 24.63 kg/m²      Physical Examination:  General: Patient well-appearing, in no distress   Eye: EOMI, clear conjunctiva, eyelids normal  HENT: Head-normocephalic and  atraumatic  Neck: full range of motion  Respiratory: no increased WOB or respiratory distress, breathing comfortably on room air  Cardiovascular: regular rate, 2+ radial  pulses  Gastrointestinal: soft, non-tender, non-distended  Musculoskeletal: full range of motion of all extremities/spine without limitation or discomfort, healed left index finger lesion  Integumentary: no rashes or skin lesions present  Neurologic: no signs of peripheral neurological deficit, motor/sensory function intact  Psychiatric:  alert and oriented, cognitive function intact, cooperative with exam, good eye contact, judgement and insight intact, mood and affect full range.    (Diabetic Foot Exam 11/28/2022: sensorimotor function intact, no ulcers or other foot lesions, thin calluses on foot dorsum more prominent at the heels, normal filament exam, 1+ DP pulses bilaterally, skin warm and dry)      Labs:  CMP:   Lab Results   Component Value Date    GLUCOSE 134 (H) 11/28/2022    CALCIUM 10.0 11/28/2022    ALBUMIN 3.8 11/28/2022     11/28/2022    K 4.2 11/28/2022    CO2 27 11/28/2022    BUN 18.9 11/28/2022    CREATININE 1.07 11/28/2022    ALKPHOS 50 11/28/2022    ALT 16 11/28/2022    AST 14 11/28/2022    BILITOT 0.3 11/28/2022      CBC:   Lab Results   Component Value Date    WBC 4.5 11/28/2022    HGB 12.6 (L) 11/28/2022    HCT 38.4 (L) 11/28/2022    MCV 93.4 11/28/2022    RDW 13.2 11/28/2022     FLP:   Lab Results   Component Value Date    CHOL 145 11/28/2022    HDL 64 (H) 11/28/2022    LDL 76.00 11/28/2022    TRIG 23 (L) 11/28/2022    TOTALCHOLEST 2 11/28/2022     DM:   Lab Results   Component Value Date    HGBA1C 6.9 11/28/2022    .3 11/28/2022    CREATININE 1.07 11/28/2022    CREATRANDUR 16.7 (L) 12/01/2022    PROTEINURINE <6.8 12/01/2022     Anemia:   Lab Results   Component Value Date    IRON 105 02/27/2019    TIBC 318 02/27/2019    FERRITIN 86.6 02/27/2019    FXKWADSL19 293 09/29/2020    FOLATE 14.7 02/27/2019          ASSESSMENT & PLAN:     Aneurysm in ascending aorta- Stable  Possibly tertiary syphilis manifestation  4.6 cm on CT thorax in 9/2018  4.7 cm on CT thorax in 10/2019  echocardiogram 9/2018-EF 60%  advised on good control of BP (systolic goal 110-120)  following a Cardiologist in [facility]. Last visit on 11/2021.  Per patient at 11/2022 [facility] visit, MD Arauz states aneurysm is stable per routine imaging with no indication for surgery.   Per patient at 3/2023 [facility] Visit, he will schedule an appt with MD Arauz, had records at home from last [facility] visit, but forgot to bring them in, if not scheduled by next clinic appt-send for imaging here    Tertiary syphilis- treated  Syphilis positive on 2/2021- dils 2, repeat on 6/2021 with dils 0  h/o Aortic aneurysm, possibly tertiary involvement  s/p 3x Bicillin injections completed    HTN  well controlled  cont Norvasc to 10 mg daily, lisinopril 20 mg daily  advised to keep a BP log, and to follow low salt diet    DM  Well controlled  A1c 6.9% in 11/2022, repeat today 3/21/2023  urine microalbumin/creatinine wnl in 11/2022. Currently on ACEI  Continue on metformin to 1000 mg bid, Tradjenta 5mg daily.  diabetic eye exam- follows ophtho, last seen 2/9/2023, no retinopathy  diabetic foot exam in clinic 11/28/2022- as above  advised on maintaining a cbg log an following diabetic diet and daily exercise    CKD stage 2  At baseline poor PO water intake, has been encouraged to drink more every clinic visit  avoid nephrotoxic meds  follows in renal clinic, last seen 12/14/2022    HFpEF  echo 9/2018-EF 60%  cont aspirin, acei, statin  holding bb due to hx of bradycardia    Chronic mild leukopenia, 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 ethnic leukopenia   Negative HIV, hep panel    Normocytic anemia  Iron panel , B12 , folate wnl  Likely 2/2 CKD    Hypercalcemia-resolved  Normal BMP, vit D and PTH    BPH with urinary incontinency  PSA wnl 2/2023  Referred to Urology, seen 2/2/2023  Continue  "tamsulosin 0.4 mg daily, per Urology: patient instructed on "timed voiding every 2-3 hrs, double voiding, avoidance of bladder irritants such as alcohol, citrus foods, chocolate, caffeinated drinks, energy drinks, spicy foods, sugar, caffeine products, sodas. Instructed patient to avoid drinking fluids 1-2 hours prior to bedtime & void immediately before bedtime."      Healthcare maintenance  Continue Aspirin 81 mg daily.   HM labs (CBC, CMP, FLP, A1c, TSH, vitD): UTD 11/28/2022  HIV/Hep panel/syphilis: HIV/Hep neg in 10/2021, Syph reactive 6/2021 s/p treatment  Pneumococcal vaccine: UTD, PSV23 2021, PSV13 2016  Tdap: UTD, 2022  Zoster vaccine: UTD, 2021  Flu: UTD, 11/2022  FIT/colonoscopy: cologuard negative 7/2022  Lung cancer screening (LDCT age 50-80): na  AAA screening (men, age 65-75, smoking history): never smoker      Return to clinic in 3 month(s).      Lennie Casillas MD  [facility] Medicine, -1  3/21/2023    "

## 2023-03-29 DIAGNOSIS — D50.9 IRON DEFICIENCY ANEMIA, UNSPECIFIED IRON DEFICIENCY ANEMIA TYPE: Primary | ICD-10-CM

## 2023-03-29 RX ORDER — FERROUS SULFATE 325(65) MG
325 TABLET ORAL EVERY OTHER DAY
Qty: 45 TABLET | Refills: 1 | Status: SHIPPED | OUTPATIENT
Start: 2023-03-29 | End: 2023-10-16

## 2023-03-29 NOTE — PROGRESS NOTES
Telephone Encounter    Spoke to Mr. Espinoza about calling [facility] to schedule an appointment for follow up for aortic aneurysm. Also informed him of iron deficiency, will send supplement every other day and repeat labs in a few months.    Lennie Casillas MD  [facility] Medicine, HO-1  3/29/2023

## 2023-04-12 LAB
CREAT UR-MCNC: 117.3 MG/DL (ref 63–166)
PROT UR STRIP-MCNC: <6.8 MG/DL

## 2023-06-09 ENCOUNTER — LAB VISIT (OUTPATIENT)
Dept: LAB | Facility: HOSPITAL | Age: 73
End: 2023-06-09
Attending: NURSE PRACTITIONER
Payer: MEDICARE

## 2023-06-09 DIAGNOSIS — N18.2 CKD STAGE G2/A1, GFR 60-89 AND ALBUMIN CREATININE RATIO <30 MG/G: ICD-10-CM

## 2023-06-09 LAB
ALBUMIN SERPL-MCNC: 4.1 G/DL (ref 3.4–4.8)
ALBUMIN/GLOB SERPL: 1.3 RATIO (ref 1.1–2)
ALP SERPL-CCNC: 52 UNIT/L (ref 40–150)
ALT SERPL-CCNC: 17 UNIT/L (ref 0–55)
APPEARANCE UR: CLEAR
AST SERPL-CCNC: 17 UNIT/L (ref 5–34)
BACTERIA #/AREA URNS AUTO: ABNORMAL /HPF
BILIRUB UR QL STRIP.AUTO: NEGATIVE MG/DL
BILIRUBIN DIRECT+TOT PNL SERPL-MCNC: 0.4 MG/DL
BUN SERPL-MCNC: 27.5 MG/DL (ref 8.4–25.7)
CALCIUM SERPL-MCNC: 9.9 MG/DL (ref 8.8–10)
CHLORIDE SERPL-SCNC: 104 MMOL/L (ref 98–107)
CO2 SERPL-SCNC: 27 MMOL/L (ref 23–31)
COLOR UR: ABNORMAL
CREAT SERPL-MCNC: 1.3 MG/DL (ref 0.73–1.18)
CREAT UR-MCNC: 171.8 MG/DL (ref 63–166)
GFR SERPLBLD CREATININE-BSD FMLA CKD-EPI: 58 MLS/MIN/1.73/M2
GLOBULIN SER-MCNC: 3.2 GM/DL (ref 2.4–3.5)
GLUCOSE SERPL-MCNC: 164 MG/DL (ref 82–115)
GLUCOSE UR QL STRIP.AUTO: NORMAL MG/DL
HYALINE CASTS #/AREA URNS LPF: ABNORMAL /LPF
KETONES UR QL STRIP.AUTO: NEGATIVE MG/DL
LEUKOCYTE ESTERASE UR QL STRIP.AUTO: NEGATIVE UNIT/L
MUCOUS THREADS URNS QL MICRO: ABNORMAL /LPF
NITRITE UR QL STRIP.AUTO: NEGATIVE
PH UR STRIP.AUTO: 5.5 [PH]
POTASSIUM SERPL-SCNC: 4.4 MMOL/L (ref 3.5–5.1)
PROT SERPL-MCNC: 7.3 GM/DL (ref 5.8–7.6)
PROT UR QL STRIP.AUTO: NEGATIVE MG/DL
PROT UR STRIP-MCNC: 6.9 MG/DL
RBC #/AREA URNS AUTO: ABNORMAL /HPF
RBC UR QL AUTO: NEGATIVE UNIT/L
SODIUM SERPL-SCNC: 136 MMOL/L (ref 136–145)
SP GR UR STRIP.AUTO: 1.02
SQUAMOUS #/AREA URNS LPF: ABNORMAL /HPF
URINE PROTEIN/CREATININE RATIO (OHS): 0
UROBILINOGEN UR STRIP-ACNC: NORMAL MG/DL
WBC #/AREA URNS AUTO: ABNORMAL /HPF

## 2023-06-09 PROCEDURE — 36415 COLL VENOUS BLD VENIPUNCTURE: CPT

## 2023-06-09 PROCEDURE — 80053 COMPREHEN METABOLIC PANEL: CPT

## 2023-06-09 PROCEDURE — 82570 ASSAY OF URINE CREATININE: CPT

## 2023-06-09 PROCEDURE — 81001 URINALYSIS AUTO W/SCOPE: CPT

## 2023-06-13 ENCOUNTER — OFFICE VISIT (OUTPATIENT)
Dept: NEPHROLOGY | Facility: CLINIC | Age: 73
End: 2023-06-13
Payer: MEDICARE

## 2023-06-13 VITALS
SYSTOLIC BLOOD PRESSURE: 120 MMHG | BODY MASS INDEX: 23.16 KG/M2 | HEIGHT: 69 IN | WEIGHT: 156.38 LBS | TEMPERATURE: 98 F | DIASTOLIC BLOOD PRESSURE: 72 MMHG | RESPIRATION RATE: 20 BRPM | OXYGEN SATURATION: 99 % | HEART RATE: 64 BPM

## 2023-06-13 DIAGNOSIS — N18.31 CKD STAGE G3A/A1, GFR 45-59 AND ALBUMIN CREATININE RATIO <30 MG/G: Primary | ICD-10-CM

## 2023-06-13 DIAGNOSIS — I10 PRIMARY HYPERTENSION: ICD-10-CM

## 2023-06-13 DIAGNOSIS — D50.9 IRON DEFICIENCY ANEMIA, UNSPECIFIED IRON DEFICIENCY ANEMIA TYPE: ICD-10-CM

## 2023-06-13 PROCEDURE — 99214 OFFICE O/P EST MOD 30 MIN: CPT | Mod: PBBFAC | Performed by: NURSE PRACTITIONER

## 2023-06-13 PROCEDURE — 99214 PR OFFICE/OUTPT VISIT, EST, LEVL IV, 30-39 MIN: ICD-10-PCS | Mod: S$PBB,,, | Performed by: NURSE PRACTITIONER

## 2023-06-13 PROCEDURE — 99214 OFFICE O/P EST MOD 30 MIN: CPT | Mod: S$PBB,,, | Performed by: NURSE PRACTITIONER

## 2023-06-13 NOTE — PROGRESS NOTES
" [facility]  Nephrology Clinic Note    Chief complaint: Chronic Kidney Disease (Follow up)    History of present illness:   Duran Espinoza is a 72 y.o. Black or  male with past medical history of chronic kidney disease, diabetes, hypertension, aortic aneurysm, and cataract.  Patient presents for follow-up appointment in Nephrology Clinic today. Followed by [facility]rology for urinary incontinence, tells me that LUTS have improved since initiation of Flomax.    Review of Systems  12 point review of systems conducted, negative except as stated in the history of present illness.    Allergies: Patient has No Known Allergies.     Past Medical History:  has a past medical history of Cataract, CKD (chronic kidney disease), DM (diabetes mellitus), and Hypertension.    Procedure History:  has a past surgical history that includes stitches (09/14/2022).    Family History: family history includes Diabetes in his brother; Hypertension in his father.    Social History:  reports that he has never smoked. He has never used smokeless tobacco. He reports that he does not currently use alcohol. He reports that he does not use drugs.    Physical exam  /72 (BP Location: Right arm, Patient Position: Sitting, BP Method: Medium (Automatic))   Pulse 64   Temp 98.3 °F (36.8 °C) (Oral)   Resp 20   Ht 5' 9" (1.753 m)   Wt 70.9 kg (156 lb 6.4 oz)   SpO2 99%   BMI 23.10 kg/m²   General appearance: Patient is in no acute distress.  Skin: No rashes or wounds.  HEENT: PERRLA, EOMI, no scleral icterus, no JVD. Neck is supple.  Chest: Respirations are unlabored. Lungs sounds are clear.   Heart: S1, S2.   Abdomen: Benign.  : Deferred.  Extremities: No edema, peripheral pulses are palpable.   Neuro: No focal deficits.     Home Medications:    Current Outpatient Medications:     amLODIPine (NORVASC) 10 MG tablet, Take 1 tablet (10 mg total) by mouth once daily. for 90 days, Disp: 90 tablet, Rfl: " 3    ascorbic acid, vitamin C, (VITAMIN C) 1000 MG tablet, Take 1 tablet (1,000 mg total) by mouth once daily., Disp: 30 tablet, Rfl: 5    aspirin (ECOTRIN) 81 MG EC tablet, Take 1 tablet (81 mg total) by mouth once daily., Disp: 90 tablet, Rfl: 3    atorvastatin (LIPITOR) 40 MG tablet, Take 1 tablet (40 mg total) by mouth once daily., Disp: 90 tablet, Rfl: 3    blood sugar diagnostic Strp, Use to check blood sugar once a day, Disp: 100 each, Rfl: 3    blood-glucose meter kit, by Other route. Use as instructed, Disp: , Rfl:     ferrous sulfate (IRON) 325 mg (65 mg iron) Tab tablet, Take 1 tablet (325 mg total) by mouth every other day., Disp: 45 tablet, Rfl: 1    lisinopriL (PRINIVIL,ZESTRIL) 20 MG tablet, Take 1 tablet (20 mg total) by mouth once daily. for 90 days, Disp: 90 tablet, Rfl: 3    metFORMIN (GLUCOPHAGE) 1000 MG tablet, Take 1 tablet (1,000 mg total) by mouth 2 (two) times daily., Disp: 180 tablet, Rfl: 3    tamsulosin (FLOMAX) 0.4 mg Cap, Take 1 capsule (0.4 mg total) by mouth once daily., Disp: 90 capsule, Rfl: 3    TRADJENTA 5 mg Tab tablet, Take 1 tablet (5 mg total) by mouth once daily., Disp: 90 tablet, Rfl: 3    vitamin D (VITAMIN D3) 1000 units Tab, Take 1 tablet (1,000 Units total) by mouth once daily., Disp: 90 tablet, Rfl: 3    Laboratory data    Serum  Lab Results   Component Value Date    WBC 4.5 11/28/2022    HGB 12.6 (L) 11/28/2022    HCT 38.4 (L) 11/28/2022     11/28/2022    IRON 54 (L) 03/21/2023    TIBC 282 03/21/2023    TRANS 249 03/21/2023    LABIRON 19 (L) 03/21/2023    FERRITIN 86.6 02/27/2019    FOLATE 14.7 02/27/2019    [id number] 293 09/29/2020     06/09/2023    K 4.4 06/09/2023    CHLORIDE 104 06/09/2023    CO2 27 06/09/2023    BUN 27.5 (H) 06/09/2023    CREATININE 1.30 (H) 06/09/2023    EGFRNORACEVR 58 06/09/2023    GLUCOSE 164 (H) 06/09/2023    CALCIUM 9.9 06/09/2023    ALKPHOS 52 06/09/2023    LABPROT 7.3 06/09/2023    ALBUMIN 4.1 06/09/2023    BILIDIR 0.2  11/05/2021    IBILI 0.20 11/05/2021    AST 17 06/09/2023    ALT 17 06/09/2023    MG 2.1 07/21/2020    PHOS 3.1 11/05/2021      Lab Results   Component Value Date    HGBA1C 6.9 03/21/2023    PTH 69.7 10/04/2021    XMSUASCB08WA 42.2 11/28/2022    HIV Nonreactive 10/04/2021    HEPCAB Nonreactive 10/04/2021    HEPBSURFAG Nonreactive 10/04/2021     Urine:  Lab Results   Component Value Date    COLORUA Light-Yellow 06/09/2023    APPEARANCEUA Clear 06/09/2023    SGUA 1.023 06/09/2023    PHUA 5.5 06/09/2023    PROTEINUA Negative 06/09/2023    GLUCOSEUA Normal 06/09/2023    KETONESUA Negative 06/09/2023    BLOODUA Negative 06/09/2023    NITRITESUA Negative 06/09/2023    LEUKOCYTESUR Negative 06/09/2023    RBCUA 0-5 06/09/2023    WBCUA 0-5 06/09/2023    BACTERIA Trace (A) 06/09/2023    SQEPUA None Seen 06/09/2023    HYALINECASTS None Seen 06/09/2023    CREATRANDUR 171.8 (H) 06/09/2023    PROTEINURINE 6.9 06/09/2023    UPROTCREA 0.0 06/09/2023         Imaging  Retroperitoneal ultrasound 8/23/2018  The right kidney measures 9.4 x 4.5 x 6.6 cm. There is a cortical cyst  present measuring 3.3 x 2.8 x 3.2 cm. There is no hydronephrosis.  The left kidney measures 12.8 x 5.8 x 6.1 cm. There are multiple cyst  present. The 2 largest measures 4.6 x 4.4 x 4.1 cm and 3.2 x 3.8 x 4.6  cm. There is no hydronephrosis.  IMPRESSION: Bilateral renal cyst.    Impression and plan     CKD stage G3a/A1, GFR 45-59 and albumin creatinine ratio <30 mg/g  -     Comprehensive Metabolic Panel; Future; Expected date: 10/01/2023  -     Phosphorus; Future; Expected date: 10/01/2023  -     Protein/Creatinine Ratio, Urine; Future; Expected date: 10/01/2023  -     Urinalysis, Reflex to Urine Culture; Future; Expected date: 10/01/2023  -     Uric Acid; Future; Expected date: 10/01/2023  -     PTH, Intact; Future; Expected date: 10/01/2023  Renal indices are stable. Continue risk factor management and periodic monitoring, Follow up in about 4 months (around  10/13/2023).    Primary hypertension  Blood pressure reading is at goal, continue current antihypertensive regimen and 2 g a day dietary sodium restriction.      Iron deficiency anemia, unspecified iron deficiency anemia type  -     CBC Auto Differential; Future; Expected date: 10/01/2023  -     Ferritin; Future; Expected date: 10/01/2023  -     Iron and TIBC; Future; Expected date: 10/01/2023  Will check iron studies prior to next visit.     BMI 24.0-24.9, adult  Continue well balanced diet.         6/13/2023  Gracie Rodriguez NP  [facility] Nephrology

## 2023-07-28 DIAGNOSIS — R35.0 URINARY FREQUENCY: Primary | ICD-10-CM

## 2023-08-10 ENCOUNTER — OFFICE VISIT (OUTPATIENT)
Dept: INTERNAL MEDICINE | Facility: CLINIC | Age: 73
End: 2023-08-10
Payer: MEDICARE

## 2023-08-10 VITALS
OXYGEN SATURATION: 98 % | WEIGHT: 157.63 LBS | HEART RATE: 51 BPM | DIASTOLIC BLOOD PRESSURE: 76 MMHG | BODY MASS INDEX: 23.27 KG/M2 | SYSTOLIC BLOOD PRESSURE: 139 MMHG | TEMPERATURE: 98 F | RESPIRATION RATE: 18 BRPM

## 2023-08-10 DIAGNOSIS — I25.10 CORONARY ARTERY DISEASE INVOLVING NATIVE CORONARY ARTERY OF NATIVE HEART WITHOUT ANGINA PECTORIS: ICD-10-CM

## 2023-08-10 DIAGNOSIS — Q23.1 BICUSPID AORTIC VALVE: ICD-10-CM

## 2023-08-10 DIAGNOSIS — I10 PRIMARY HYPERTENSION: ICD-10-CM

## 2023-08-10 DIAGNOSIS — E11.9 TYPE 2 DIABETES MELLITUS WITHOUT COMPLICATION, WITHOUT LONG-TERM CURRENT USE OF INSULIN: Primary | ICD-10-CM

## 2023-08-10 DIAGNOSIS — I71.9 AORTIC ANEURYSM, UNSPECIFIED PORTION OF AORTA, UNSPECIFIED WHETHER RUPTURED: ICD-10-CM

## 2023-08-10 PROCEDURE — 99213 OFFICE O/P EST LOW 20 MIN: CPT | Mod: PBBFAC | Performed by: STUDENT IN AN ORGANIZED HEALTH CARE EDUCATION/TRAINING PROGRAM

## 2023-08-10 NOTE — PROGRESS NOTES
Attending Addendum:   Patient seen and examined in clinic. Management and Plan were discussed with resident. Care was reasonable and necessary.   Meghan Valentine MD  [facility]

## 2023-08-10 NOTE — PROGRESS NOTES
[facility] INTERNAL MEDICINE  OUTPATIENT OFFICE VISIT NOTE    SUBJECTIVE:      Chief Complaint: Follow-up       HPI: Duran Espinoza is a 72 y.o. yo male w/ PMH of  has a past medical history of Cataract, CKD (chronic kidney disease), DM (diabetes mellitus), and Hypertension., who presents for follow up and medication refill.    Today, patient denies any complaints. Recently seen in [place] for monitoring of ascending aneurysm. Records received, TTE in 4/2023 with moderately dilated ascending aneurysm. Per patient, stable in size with no plans for surgical intervention.    Past Medical History:   has a past medical history of Cataract, CKD (chronic kidney disease), DM (diabetes mellitus), and Hypertension.     Past Surgical History:   has a past surgical history that includes stitches (09/14/2022).     Family History:  family history includes Diabetes in his brother; Hypertension in his father.     Social History:   reports that he has never smoked. He has never used smokeless tobacco. He reports that he does not currently use alcohol. He reports that he does not use drugs.     Allergies:  has No Known Allergies.     Home Medications:  Prior to Admission medications    Medication Sig Start Date End Date Taking? Authorizing Provider   ascorbic acid, vitamin C, (VITAMIN C) 1000 MG tablet Take 1,000 mg by mouth once daily. 10/4/21  Yes Historical Provider   aspirin (ECOTRIN) 81 MG EC tablet Take 81 mg by mouth once daily.   Yes Historical Provider   atorvastatin (LIPITOR) 40 MG tablet Take 40 mg by mouth once daily. 2/9/22  Yes Historical Provider   blood sugar diagnostic Strp Use to check blood sugar once a day 6/1/22  Yes Amber Mittal MD   metFORMIN (GLUCOPHAGE) 1000 MG tablet Take 1,000 mg by mouth 2 (two) times daily. 2/12/22  Yes Historical Provider   vitamin D (VITAMIN D3) 1000 units Tab Take 1,000 Units by mouth once daily.   Yes Historical Provider   amLODIPine (NORVASC) 10 MG tablet Take 10 mg by mouth once daily.  for 90 days 4/11/22 11/28/22 Yes Historical Provider   lisinopriL (PRINIVIL,ZESTRIL) 20 MG tablet Take 20 mg by mouth once daily. for 90 days 4/18/22 11/28/22 Yes Historical Provider   TRADJENTA 5 mg Tab tablet Take 5 mg by mouth once daily. 3/4/22 11/28/22 Yes Historical Provider   amLODIPine (NORVASC) 10 MG tablet Take 1 tablet (10 mg total) by mouth once daily. for 90 days 11/28/22 11/23/23  Lennie Casillas MD   lisinopriL (PRINIVIL,ZESTRIL) 20 MG tablet Take 1 tablet (20 mg total) by mouth once daily. for 90 days 11/28/22 8/25/23  Lennie Casillas MD   TRADJENTA 5 mg Tab tablet Take 1 tablet (5 mg total) by mouth once daily. 11/28/22 8/25/23  Lennie Casillas MD       ROS:  Constitutional: no fever, fatigue, weakness  Eye: no vision loss, eye redness, drainage, or pain  ENMT: no sore throat, ear pain, sinus pain/congestion, nasal congestion/drainage  Respiratory: no cough, no wheezing, no shortness of breath  Cardiovascular: no chest pain, no palpitations, no edema  Gastrointestinal: no nausea, vomiting, or diarrhea. No abdominal pain  Genitourinary: no dysuria, no urinary frequency or urgency, no hematuria  Hema/Lymph: no abnormal bruising or bleeding  Endocrine: no heat or cold intolerance, no excessive thirst or excessive urination  Musculoskeletal: no muscle or joint pain, left index finger paresthesia and tingling  Integumentary: no skin rash or abnormal lesion  Neurologic: no headache, no dizziness, no weakness or numbness         OBJECTIVE:     Vital signs:   /76 (BP Location: Left arm, Patient Position: Sitting, BP Method: Medium (Automatic))   Pulse (!) 51   Temp 98.4 °F (36.9 °C) (Oral)   Resp 18   Wt 71.5 kg (157 lb 9.6 oz)   SpO2 98%   BMI 23.27 kg/m²      Physical Examination:  General: Patient well-appearing, in no distress   Eye: EOMI, clear conjunctiva, eyelids normal  HENT: Head-normocephalic and atraumatic  Neck: full range of motion  Respiratory: no increased WOB or respiratory distress,  breathing comfortably on room air  Cardiovascular: regular rate, 2+ radial  pulses  Gastrointestinal: soft, non-tender, non-distended  Musculoskeletal: full range of motion of all extremities/spine without limitation or discomfort, healed left index finger lesion  Integumentary: no rashes or skin lesions present  Neurologic: no signs of peripheral neurological deficit, motor/sensory function intact  Psychiatric:  alert and oriented, cognitive function intact, cooperative with exam, good eye contact, judgement and insight intact, mood and affect full range.    (Diabetic Foot Exam 11/28/2022: sensorimotor function intact, no ulcers or other foot lesions, thin calluses on foot dorsum more prominent at the heels, normal filament exam, 1+ DP pulses bilaterally, skin warm and dry)      Labs:  CMP:   Lab Results   Component Value Date    GLUCOSE 164 (H) 06/09/2023    CALCIUM 9.9 06/09/2023    ALBUMIN 4.1 06/09/2023     06/09/2023    K 4.4 06/09/2023    CO2 27 06/09/2023    BUN 27.5 (H) 06/09/2023    CREATININE 1.30 (H) 06/09/2023    ALKPHOS 52 06/09/2023    ALT 17 06/09/2023    AST 17 06/09/2023    BILITOT 0.4 06/09/2023      CBC:   Lab Results   Component Value Date    WBC 4.5 11/28/2022    HGB 12.6 (L) 11/28/2022    HCT 38.4 (L) 11/28/2022    MCV 93.4 11/28/2022    RDW 13.2 11/28/2022     FLP:   Lab Results   Component Value Date    CHOL 145 11/28/2022    HDL 64 (H) 11/28/2022    LDL 76.00 11/28/2022    TRIG 23 (L) 11/28/2022    TOTALCHOLEST 2 11/28/2022     DM:   Lab Results   Component Value Date    HGBA1C 6.9 03/21/2023    .3 03/21/2023    CREATININE 1.30 (H) 06/09/2023    CREATRANDUR 171.8 (H) 06/09/2023    PROTEINURINE 6.9 06/09/2023     Anemia:   Lab Results   Component Value Date    IRON 54 (L) 03/21/2023    TIBC 282 03/21/2023    FERRITIN 86.6 02/27/2019    DPVOOGOP24 293 09/29/2020    FOLATE 14.7 02/27/2019         ASSESSMENT & PLAN:     Aneurysm in ascending aorta- Stable  Possibly tertiary  "syphilis manifestation  4.6 cm on CT thorax in 9/2018  4.7 cm on CT thorax in 10/2019  echocardiogram 9/2018-EF 60%  advised on good control of BP (systolic goal 110-120)  following a Cardiologist in [place]. Last visit on 11/2021.  [place] [facility] [place] states aneurysm is stable per routine imaging with no indication for surgery.   Seen by Kurtis Pollock in 4/2023: TTE with moderate dilated ascending aorta, stable with no plans for surgical intervention at this time    Tertiary syphilis- treated  Syphilis positive on 2/2021- dils 2, repeat on 6/2021 with dils 0  h/o Aortic aneurysm, possibly tertiary involvement  s/p 3x Bicillin injections completed    HTN  well controlled  cont Norvasc to 10 mg daily, lisinopril 20 mg daily  advised to keep a BP log, and to follow low salt diet    DM  Well controlled  A1c 6.9% in 11/2022, repeat today 3/21/2023  urine microalbumin/creatinine wnl in 11/2022. Currently on ACEI  Continue on metformin to 1000 mg bid, Tradjenta 5mg daily.  diabetic eye exam- follows ophtho, last seen 2/9/2023, no retinopathy  diabetic foot exam in clinic 11/28/2022- as above  advised on maintaining a cbg log an following diabetic diet and daily exercise    CKD stage 2  At baseline poor PO water intake, has been encouraged to drink more every clinic visit  avoid nephrotoxic meds  follows in renal clinic, last seen 12/14/2022    HFpEF  echo 9/2018-EF 60%  cont aspirin, acei, statin  holding bb due to hx of bradycardia    Chronic mild leukopenia, 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 ethnic leukopenia   Negative HIV, hep panel    Normocytic anemia  Iron panel , B12 , folate wnl  Likely 2/2 CKD    Hypercalcemia-resolved  Normal BMP, vit D and PTH    BPH with urinary incontinency  PSA wnl 2/2023  Referred to Urology, seen 2/2/2023  Continue tamsulosin 0.4 mg daily, per Urology: patient instructed on "timed voiding every 2-3 hrs, double voiding, avoidance of bladder irritants such as alcohol, citrus " "foods, chocolate, caffeinated drinks, energy drinks, spicy foods, sugar, caffeine products, sodas. Instructed patient to avoid drinking fluids 1-2 hours prior to bedtime & void immediately before bedtime."      Healthcare maintenance  Continue Aspirin 81 mg daily.   HM labs (CBC, CMP, FLP, A1c, TSH, vitD): UTD 11/28/2022  HIV/Hep panel/syphilis: HIV/Hep neg in 10/2021, Syph reactive 6/2021 s/p treatment  Pneumococcal vaccine: UTD, PSV23 2021, PSV13 2016  Tdap: UTD, 2022  Zoster vaccine: UTD, 2021  Flu: UTD, 11/2022  FIT/colonoscopy: cologuard negative 7/2022  Lung cancer screening (LDCT age 50-80): na  AAA screening (men, age 65-75, smoking history): never smoker      Return to clinic in 4-5 month(s).      Lennie Casillas MD  \[facility]\"" Internal Medicine, HO-2  8/10/2023     "

## 2023-08-15 ENCOUNTER — OFFICE VISIT (OUTPATIENT)
Dept: UROLOGY | Facility: CLINIC | Age: 73
End: 2023-08-15
Payer: MEDICARE

## 2023-08-15 VITALS
RESPIRATION RATE: 18 BRPM | HEART RATE: 54 BPM | OXYGEN SATURATION: 98 % | SYSTOLIC BLOOD PRESSURE: 135 MMHG | BODY MASS INDEX: 22.96 KG/M2 | WEIGHT: 155 LBS | DIASTOLIC BLOOD PRESSURE: 79 MMHG | HEIGHT: 69 IN

## 2023-08-15 DIAGNOSIS — N40.1 BENIGN PROSTATIC HYPERPLASIA WITH URINARY FREQUENCY: Primary | ICD-10-CM

## 2023-08-15 DIAGNOSIS — R35.0 URINARY FREQUENCY: ICD-10-CM

## 2023-08-15 DIAGNOSIS — R35.0 BENIGN PROSTATIC HYPERPLASIA WITH URINARY FREQUENCY: Primary | ICD-10-CM

## 2023-08-15 PROCEDURE — 99213 OFFICE O/P EST LOW 20 MIN: CPT | Mod: S$PBB,,, | Performed by: NURSE PRACTITIONER

## 2023-08-15 PROCEDURE — 99214 OFFICE O/P EST MOD 30 MIN: CPT | Mod: PBBFAC | Performed by: NURSE PRACTITIONER

## 2023-08-15 PROCEDURE — 99213 PR OFFICE/OUTPT VISIT, EST, LEVL III, 20-29 MIN: ICD-10-PCS | Mod: S$PBB,,, | Performed by: NURSE PRACTITIONER

## 2023-08-15 RX ORDER — TAMSULOSIN HYDROCHLORIDE 0.4 MG/1
0.4 CAPSULE ORAL DAILY
Qty: 90 CAPSULE | Refills: 3 | Status: SHIPPED | OUTPATIENT
Start: 2023-08-15 | End: 2024-02-15

## 2023-08-15 NOTE — PROGRESS NOTES
Chief Complaint:   Chief Complaint   Patient presents with    Follow-up     6 month follow up with PSA. Last visit 02/02/2023 Urinary frequency, Urge incontinence of urine; Referral 12/14/2022 (PSA 1.94 on 08/10/2023)       HPI:  Patient is a 72-year-old male here for six-month follow-up for urinary urgency, urinary frequency, BPH.  Patient continued on tamsulosin 0.4 mg p.o. daily patient denies any urinary frequency, urinary urgency, has good urine stream and without the dysuria, urinary retention, gross hematuria, urinary hesitation.  PSA 1.94.    Allergies:  Review of patient's allergies indicates:  No Known Allergies    Medications:  Current Outpatient Medications   Medication Sig Dispense Refill    amLODIPine (NORVASC) 10 MG tablet Take 1 tablet (10 mg total) by mouth once daily. for 90 days 90 tablet 3    ascorbic acid, vitamin C, (VITAMIN C) 1000 MG tablet Take 1 tablet (1,000 mg total) by mouth once daily. 30 tablet 5    aspirin (ECOTRIN) 81 MG EC tablet Take 1 tablet (81 mg total) by mouth once daily. 90 tablet 3    atorvastatin (LIPITOR) 40 MG tablet Take 1 tablet (40 mg total) by mouth once daily. 90 tablet 3    blood sugar diagnostic Strp Use to check blood sugar once a day 100 each 3    blood-glucose meter kit by Other route. Use as instructed      ferrous sulfate (IRON) 325 mg (65 mg iron) Tab tablet Take 1 tablet (325 mg total) by mouth every other day. 45 tablet 1    lisinopriL (PRINIVIL,ZESTRIL) 20 MG tablet Take 1 tablet (20 mg total) by mouth once daily. for 90 days 90 tablet 3    metFORMIN (GLUCOPHAGE) 1000 MG tablet Take 1 tablet (1,000 mg total) by mouth 2 (two) times daily. 180 tablet 3    tamsulosin (FLOMAX) 0.4 mg Cap Take 1 capsule (0.4 mg total) by mouth once daily. 90 capsule 3    TRADJENTA 5 mg Tab tablet Take 1 tablet (5 mg total) by mouth once daily. 90 tablet 3    vitamin D (VITAMIN D3) 1000 units Tab Take 1 tablet (1,000 Units total) by mouth once daily. 90 tablet 3     No current  facility-administered medications for this visit.       Review of Systems:  General: No fever, chills, fatigability, or weight loss.  Skin: No rashes, itching, or changes in color or texture of skin.  Chest: Denies HYMAN, cyanosis, wheezing, cough, and sputum production.  Abdomen: Appetite fine. No weight loss. Denies diarrhea, abdominal pain, hematemesis, or blood in stool.  Musculoskeletal: No joint stiffness or swelling. Denies back pain.  : As above.  All other review of systems negative.    PMH:  Past Medical History:   Diagnosis Date    Cataract     CKD (chronic kidney disease)     DM (diabetes mellitus)     Hypertension        PSH:  Past Surgical History:   Procedure Laterality Date    stitches  09/14/2022    left fore finger       FamHx:  Family History   Problem Relation Age of Onset    Hypertension Father     Diabetes Brother        SocHx:  Social History     Socioeconomic History    Marital status:     Number of children: 2   Tobacco Use    Smoking status: Never    Smokeless tobacco: Never   Substance and Sexual Activity    Alcohol use: Not Currently    Drug use: Never    Sexual activity: Not Currently     Social Determinants of Health     Financial Resource Strain: Low Risk  (6/16/2022)    Overall Financial Resource Strain (CARDIA)     Difficulty of Paying Living Expenses: Not hard at all   Food Insecurity: No Food Insecurity (6/16/2022)    Hunger Vital Sign     Worried About Running Out of Food in the Last Year: Never true     Ran Out of Food in the Last Year: Never true   Transportation Needs: No Transportation Needs (6/16/2022)    PRAPARE - Transportation     Lack of Transportation (Medical): No     Lack of Transportation (Non-Medical): No   Physical Activity: Inactive (6/16/2022)    Exercise Vital Sign     Days of Exercise per Week: 0 days     Minutes of Exercise per Session: 0 min   Stress: No Stress Concern Present (6/16/2022)    [place] [employer] - Occupational Stress  Questionnaire     Feeling of Stress : Not at all   Social Connections: Moderately Integrated (6/16/2022)    Social Connection and Isolation Panel [NHANES]     Frequency of Communication with Friends and Family: More than three times a week     Frequency of Social Gatherings with Friends and Family: Never     Attends Religion Services: More than 4 times per year     Active Member of Clubs or Organizations: No     Attends Club or Organization Meetings: Never     Marital Status:    Housing Stability: Low Risk  (6/16/2022)    Housing Stability Vital Sign     Unable to Pay for Housing in the Last Year: No     Number of Places Lived in the Last Year: 1     Unstable Housing in the Last Year: No       Physical Exam:  Vitals:    08/15/23 0811   BP: 135/79   Pulse: (!) 54   Resp: 18     General: A&Ox3, no apparent distress, no deformities  Neck: No masses, normal thyroid  Lungs: CTA angela, no use of accessory muscles  Heart: RRR, no arrhythmias  Abdomen: Soft, NT, ND, no masses, no hernias, no hepatosplenomegaly  Lymphatic: Neck and groin nodes negative  Skin: The skin is warm and dry. No jaundice.  Ext: No c/c/e.    [place]:  Patient Deferred NABOR exam.    Labs:     Recent Labs     08/10/23  0950   PSA 1.94            Impression:  1. BPH  2. Urinary urgency  There are no diagnoses linked to this encounter.    Plan: Instructed patient continue tamsulosin 0.4 mg p.o. daily.  RTC 6 months with PSA.  Reinforced patient teaching on timed voiding every 2-3 hrs, double voiding, avoidance of bladder irritants such as alcohol, citrus foods, chocolate, caffeinated drinks, energy drinks, spicy foods, sugar, caffeine products, sodas. Instructed patient to avoid drinking fluids 1-2 hours prior to bedtime & void immediately before bedtime.

## 2023-10-02 ENCOUNTER — TELEPHONE (OUTPATIENT)
Dept: INTERNAL MEDICINE | Facility: CLINIC | Age: 73
End: 2023-10-02

## 2023-10-10 ENCOUNTER — LAB VISIT (OUTPATIENT)
Dept: LAB | Facility: HOSPITAL | Age: 73
End: 2023-10-10
Attending: NURSE PRACTITIONER
Payer: MEDICARE

## 2023-10-10 ENCOUNTER — TELEPHONE (OUTPATIENT)
Dept: NEPHROLOGY | Facility: CLINIC | Age: 73
End: 2023-10-10
Payer: MEDICARE

## 2023-10-10 DIAGNOSIS — N18.31 CKD STAGE G3A/A1, GFR 45-59 AND ALBUMIN CREATININE RATIO <30 MG/G: ICD-10-CM

## 2023-10-10 DIAGNOSIS — D50.9 IRON DEFICIENCY ANEMIA, UNSPECIFIED IRON DEFICIENCY ANEMIA TYPE: ICD-10-CM

## 2023-10-10 LAB
ALBUMIN SERPL-MCNC: 4 G/DL (ref 3.4–4.8)
ALBUMIN/GLOB SERPL: 1.3 RATIO (ref 1.1–2)
ALP SERPL-CCNC: 56 UNIT/L (ref 40–150)
ALT SERPL-CCNC: 25 UNIT/L (ref 0–55)
APPEARANCE UR: CLEAR
AST SERPL-CCNC: 21 UNIT/L (ref 5–34)
BACTERIA #/AREA URNS AUTO: ABNORMAL /HPF
BASOPHILS # BLD AUTO: 0.02 X10(3)/MCL
BASOPHILS NFR BLD AUTO: 0.5 %
BILIRUB SERPL-MCNC: 0.5 MG/DL
BILIRUB UR QL STRIP.AUTO: NEGATIVE
BUN SERPL-MCNC: 19.9 MG/DL (ref 8.4–25.7)
CALCIUM SERPL-MCNC: 10.1 MG/DL (ref 8.8–10)
CHLORIDE SERPL-SCNC: 106 MMOL/L (ref 98–107)
CO2 SERPL-SCNC: 28 MMOL/L (ref 23–31)
COLOR UR AUTO: COLORLESS
CREAT SERPL-MCNC: 1.28 MG/DL (ref 0.73–1.18)
CREAT UR-MCNC: 55 MG/DL (ref 63–166)
EOSINOPHIL # BLD AUTO: 0.13 X10(3)/MCL (ref 0–0.9)
EOSINOPHIL NFR BLD AUTO: 3.2 %
ERYTHROCYTE [DISTWIDTH] IN BLOOD BY AUTOMATED COUNT: 13.4 % (ref 11.5–17)
FERRITIN SERPL-MCNC: 73.91 NG/ML (ref 21.81–274.66)
GFR SERPLBLD CREATININE-BSD FMLA CKD-EPI: 59 MLS/MIN/1.73/M2
GLOBULIN SER-MCNC: 3.2 GM/DL (ref 2.4–3.5)
GLUCOSE SERPL-MCNC: 181 MG/DL (ref 82–115)
GLUCOSE UR QL STRIP.AUTO: NORMAL
HCT VFR BLD AUTO: 40.9 % (ref 42–52)
HGB BLD-MCNC: 13.2 G/DL (ref 14–18)
HYALINE CASTS #/AREA URNS LPF: ABNORMAL /LPF
IMM GRANULOCYTES # BLD AUTO: 0.01 X10(3)/MCL (ref 0–0.04)
IMM GRANULOCYTES NFR BLD AUTO: 0.2 %
IRON SATN MFR SERPL: 27 % (ref 20–50)
IRON SERPL-MCNC: 80 UG/DL (ref 65–175)
KETONES UR QL STRIP.AUTO: NEGATIVE
LEUKOCYTE ESTERASE UR QL STRIP.AUTO: NEGATIVE
LYMPHOCYTES # BLD AUTO: 1.05 X10(3)/MCL (ref 0.6–4.6)
LYMPHOCYTES NFR BLD AUTO: 26.2 %
MCH RBC QN AUTO: 31 PG (ref 27–31)
MCHC RBC AUTO-ENTMCNC: 32.3 G/DL (ref 33–36)
MCV RBC AUTO: 96 FL (ref 80–94)
MONOCYTES # BLD AUTO: 0.43 X10(3)/MCL (ref 0.1–1.3)
MONOCYTES NFR BLD AUTO: 10.7 %
NEUTROPHILS # BLD AUTO: 2.37 X10(3)/MCL (ref 2.1–9.2)
NEUTROPHILS NFR BLD AUTO: 59.2 %
NITRITE UR QL STRIP.AUTO: NEGATIVE
NRBC BLD AUTO-RTO: 0 %
PH UR STRIP.AUTO: 5.5 [PH]
PHOSPHATE SERPL-MCNC: 3.2 MG/DL (ref 2.3–4.7)
PLATELET # BLD AUTO: 208 X10(3)/MCL (ref 130–400)
PMV BLD AUTO: 9.9 FL (ref 7.4–10.4)
POTASSIUM SERPL-SCNC: 4.1 MMOL/L (ref 3.5–5.1)
PROT SERPL-MCNC: 7.2 GM/DL (ref 5.8–7.6)
PROT UR QL STRIP.AUTO: NEGATIVE
PROT UR STRIP-MCNC: <6.8 MG/DL
PTH-INTACT SERPL-MCNC: 61.3 PG/ML (ref 8.7–77)
RBC # BLD AUTO: 4.26 X10(6)/MCL (ref 4.7–6.1)
RBC #/AREA URNS AUTO: ABNORMAL /HPF
RBC UR QL AUTO: NEGATIVE
SODIUM SERPL-SCNC: 141 MMOL/L (ref 136–145)
SP GR UR STRIP.AUTO: 1.01 (ref 1–1.03)
SQUAMOUS #/AREA URNS LPF: ABNORMAL /HPF
TIBC SERPL-MCNC: 217 UG/DL (ref 69–240)
TIBC SERPL-MCNC: 297 UG/DL (ref 250–450)
TRANSFERRIN SERPL-MCNC: 254 MG/DL (ref 163–344)
URATE SERPL-MCNC: 5.8 MG/DL (ref 3.5–7.2)
UROBILINOGEN UR STRIP-ACNC: NORMAL
WBC # SPEC AUTO: 4.01 X10(3)/MCL (ref 4.5–11.5)
WBC #/AREA URNS AUTO: ABNORMAL /HPF

## 2023-10-10 PROCEDURE — 82570 ASSAY OF URINE CREATININE: CPT

## 2023-10-10 PROCEDURE — 81001 URINALYSIS AUTO W/SCOPE: CPT

## 2023-10-10 PROCEDURE — 84100 ASSAY OF PHOSPHORUS: CPT

## 2023-10-10 PROCEDURE — 82728 ASSAY OF FERRITIN: CPT

## 2023-10-10 PROCEDURE — 85025 COMPLETE CBC W/AUTO DIFF WBC: CPT

## 2023-10-10 PROCEDURE — 80053 COMPREHEN METABOLIC PANEL: CPT

## 2023-10-10 PROCEDURE — 84550 ASSAY OF BLOOD/URIC ACID: CPT

## 2023-10-10 PROCEDURE — 83970 ASSAY OF PARATHORMONE: CPT

## 2023-10-10 PROCEDURE — 36415 COLL VENOUS BLD VENIPUNCTURE: CPT

## 2023-10-10 PROCEDURE — 83540 ASSAY OF IRON: CPT

## 2023-10-10 NOTE — TELEPHONE ENCOUNTER
Left message:  Iron stores are now adequate. Patient no longer needs to continue any iron. Thank you

## 2023-10-10 NOTE — TELEPHONE ENCOUNTER
Patient stated he needs a refill on the medication for his Iron , patient is unable to tell me the name of the medication.     Patient has been out of this medication for about three weeks.

## 2023-10-16 ENCOUNTER — OFFICE VISIT (OUTPATIENT)
Dept: NEPHROLOGY | Facility: CLINIC | Age: 73
End: 2023-10-16
Payer: MEDICARE

## 2023-10-16 VITALS
WEIGHT: 160.38 LBS | OXYGEN SATURATION: 100 % | SYSTOLIC BLOOD PRESSURE: 139 MMHG | DIASTOLIC BLOOD PRESSURE: 80 MMHG | HEIGHT: 69 IN | TEMPERATURE: 98 F | BODY MASS INDEX: 23.76 KG/M2 | HEART RATE: 64 BPM

## 2023-10-16 DIAGNOSIS — I10 PRIMARY HYPERTENSION: ICD-10-CM

## 2023-10-16 DIAGNOSIS — N18.31 CKD STAGE G3A/A1, GFR 45-59 AND ALBUMIN CREATININE RATIO <30 MG/G: Primary | ICD-10-CM

## 2023-10-16 DIAGNOSIS — Z23 FLU VACCINE NEED: ICD-10-CM

## 2023-10-16 PROCEDURE — G0008 ADMIN INFLUENZA VIRUS VAC: HCPCS | Mod: PBBFAC

## 2023-10-16 PROCEDURE — 99214 PR OFFICE/OUTPT VISIT, EST, LEVL IV, 30-39 MIN: ICD-10-PCS | Mod: S$PBB,,, | Performed by: NURSE PRACTITIONER

## 2023-10-16 PROCEDURE — 99214 OFFICE O/P EST MOD 30 MIN: CPT | Mod: S$PBB,,, | Performed by: NURSE PRACTITIONER

## 2023-10-16 PROCEDURE — 99214 OFFICE O/P EST MOD 30 MIN: CPT | Mod: PBBFAC | Performed by: NURSE PRACTITIONER

## 2023-10-16 NOTE — PROGRESS NOTES
"[facility]  Nephrology Clinic Note    Chief complaint: Follow-up    History of present illness:   Duran Espinoza is a 72 y.o. Black or  male with past medical history of chronic kidney disease, diabetes, hypertension, aortic aneurysm, and cataract.  Patient presents for follow-up appointment in Nephrology Clinic today. Followed by [facility] for urinary incontinence. Denies complaints today.     Review of Systems  12 point review of systems conducted, negative except as stated in the history of present illness.    Allergies: Patient has No Known Allergies.     Past Medical History:  has a past medical history of Cataract, CKD (chronic kidney disease), DM (diabetes mellitus), and Hypertension.    Procedure History:  has a past surgical history that includes stitches (09/14/2022).    Family History: family history includes Diabetes in his brother; Hypertension in his father.    Social History:  reports that he has never smoked. He has never used smokeless tobacco. He reports that he does not currently use alcohol. He reports that he does not use drugs.    Physical exam  /80 (BP Location: Right arm, Patient Position: Sitting, BP Method: Large (Automatic))   Pulse 64   Temp 98.4 °F (36.9 °C) (Oral)   Ht 5' 9" (1.753 m)   Wt 72.8 kg (160 lb 6.4 oz)   SpO2 100%   BMI 23.69 kg/m²   General appearance: Patient is in no acute distress.  Skin: No rashes or wounds.  HEENT: PERRLA, EOMI, no scleral icterus, no JVD. Neck is supple.  Chest: Respirations are unlabored. Lungs sounds are clear.   Heart: S1, S2.   Abdomen: Benign.  : Deferred.  Extremities: No edema, peripheral pulses are palpable.   Neuro: No focal deficits.     Home Medications:    Current Outpatient Medications:     amLODIPine (NORVASC) 10 MG tablet, Take 1 tablet (10 mg total) by mouth once daily. for 90 days, Disp: 90 tablet, Rfl: 3    aspirin (ECOTRIN) 81 MG EC tablet, Take 1 tablet (81 mg total) by " mouth once daily., Disp: 90 tablet, Rfl: 3    atorvastatin (LIPITOR) 40 MG tablet, Take 1 tablet (40 mg total) by mouth once daily., Disp: 90 tablet, Rfl: 3    blood sugar diagnostic Strp, Use to check blood sugar once a day, Disp: 100 each, Rfl: 3    blood-glucose meter kit, by Other route. Use as instructed, Disp: , Rfl:     lisinopriL (PRINIVIL,ZESTRIL) 20 MG tablet, Take 1 tablet (20 mg total) by mouth once daily. for 90 days, Disp: 90 tablet, Rfl: 3    metFORMIN (GLUCOPHAGE) 1000 MG tablet, Take 1 tablet (1,000 mg total) by mouth 2 (two) times daily., Disp: 180 tablet, Rfl: 3    tamsulosin (FLOMAX) 0.4 mg Cap, Take 1 capsule (0.4 mg total) by mouth once daily., Disp: 90 capsule, Rfl: 3    TRADJENTA 5 mg Tab tablet, Take 1 tablet (5 mg total) by mouth once daily., Disp: 90 tablet, Rfl: 3    vitamin D (VITAMIN D3) 1000 units Tab, Take 1 tablet (1,000 Units total) by mouth once daily., Disp: 90 tablet, Rfl: 3    Laboratory data    Serum  Lab Results   Component Value Date    WBC 4.01 (L) 10/10/2023    HGB 13.2 (L) 10/10/2023    HCT 40.9 (L) 10/10/2023     10/10/2023    IRON 80 10/10/2023    TIBC 297 10/10/2023    TRANS 254 10/10/2023    LABIRON 27 10/10/2023    FERRITIN 73.91 10/10/2023    FOLATE 14.7 02/27/2019    YNWQFVZA25 293 09/29/2020     10/10/2023    K 4.1 10/10/2023    CHLORIDE 106 10/10/2023    CO2 28 10/10/2023    BUN 19.9 10/10/2023    CREATININE 1.28 (H) 10/10/2023    EGFRNORACEVR 59 10/10/2023    GLUCOSE 181 (H) 10/10/2023    CALCIUM 10.1 (H) 10/10/2023    ALKPHOS 56 10/10/2023    LABPROT 7.2 10/10/2023    ALBUMIN 4.0 10/10/2023    BILIDIR 0.2 11/05/2021    IBILI 0.20 11/05/2021    AST 21 10/10/2023    ALT 25 10/10/2023    MG 2.1 07/21/2020    PHOS 3.2 10/10/2023      Lab Results   Component Value Date    HGBA1C 6.9 03/21/2023    PTH 61.3 10/10/2023    RPFYRTTL18YJ 42.2 11/28/2022    HIV Nonreactive 10/04/2021    HEPCAB Nonreactive 10/04/2021    HEPBSURFAG Nonreactive 10/04/2021      Urine:  Lab Results   Component Value Date    COLORUA Colorless (A) 10/10/2023    APPEARANCEUA Clear 10/10/2023    SGUA 1.011 10/10/2023    PHUA 5.5 10/10/2023    PROTEINUA Negative 10/10/2023    GLUCOSEUA Normal 10/10/2023    KETONESUA Negative 10/10/2023    BLOODUA Negative 10/10/2023    NITRITESUA Negative 10/10/2023    LEUKOCYTESUR Negative 10/10/2023    RBCUA 0-5 10/10/2023    WBCUA 0-5 10/10/2023    BACTERIA None Seen 10/10/2023    SQEPUA None Seen 10/10/2023    HYALINECASTS None Seen 10/10/2023    CREATRANDUR 55.0 (L) 10/10/2023    PROTEINURINE <6.8 10/10/2023    UPROTCREA 0.0 06/09/2023         Imaging  Reviewed    Impression and plan     CKD stage G3a/A1, GFR 45-59 and albumin creatinine ratio <30 mg/g  -     Comprehensive Metabolic Panel; Future; Expected date: 06/07/2024  -     Protein/Creatinine Ratio, Urine; Future; Expected date: 06/07/2024  -     Urinalysis, Reflex to Urine Culture; Future; Expected date: 06/07/2024  Renal indices are stable , there is no significant proteinuria or active urinary sediment.  Continue current management and periodic monitoring.    Follow up in about 8 months (around 6/16/2024).    Primary hypertension  Blood pressure reading is at goal, continue current antihypertensive regimen and 2 g a day dietary sodium restriction.      Flu vaccine need  -     Influenza (FLUAD) - Quadrivalent (Adjuvanted) *Preferred* (65+) (PF)  Will administer flu vaccine today.            10/16/2023  Gracie Rodirguez NP  [facility] Nephrology

## 2023-10-17 DIAGNOSIS — I10 HYPERTENSION, UNSPECIFIED TYPE: ICD-10-CM

## 2023-10-17 RX ORDER — LISINOPRIL 20 MG/1
20 TABLET ORAL DAILY
Qty: 90 TABLET | Refills: 3 | Status: SHIPPED | OUTPATIENT
Start: 2023-10-17 | End: 2024-10-16

## 2023-12-28 ENCOUNTER — TELEPHONE (OUTPATIENT)
Dept: INTERNAL MEDICINE | Facility: CLINIC | Age: 73
End: 2023-12-28

## 2023-12-28 ENCOUNTER — TELEPHONE (OUTPATIENT)
Dept: DIABETES | Facility: CLINIC | Age: 73
End: 2023-12-28
Payer: MEDICARE

## 2023-12-28 ENCOUNTER — OFFICE VISIT (OUTPATIENT)
Dept: INTERNAL MEDICINE | Facility: CLINIC | Age: 73
End: 2023-12-28
Payer: MEDICARE

## 2023-12-28 VITALS
OXYGEN SATURATION: 98 % | BODY MASS INDEX: 24.41 KG/M2 | HEIGHT: 69 IN | WEIGHT: 164.81 LBS | SYSTOLIC BLOOD PRESSURE: 137 MMHG | DIASTOLIC BLOOD PRESSURE: 88 MMHG | TEMPERATURE: 98 F | RESPIRATION RATE: 20 BRPM | HEART RATE: 65 BPM

## 2023-12-28 DIAGNOSIS — E11.69 TYPE 2 DIABETES MELLITUS WITH OTHER SPECIFIED COMPLICATION, WITH LONG-TERM CURRENT USE OF INSULIN: Primary | ICD-10-CM

## 2023-12-28 DIAGNOSIS — E11.9 TYPE 2 DIABETES MELLITUS WITHOUT COMPLICATION, WITHOUT LONG-TERM CURRENT USE OF INSULIN: Primary | ICD-10-CM

## 2023-12-28 DIAGNOSIS — Z79.4 TYPE 2 DIABETES MELLITUS WITH OTHER SPECIFIED COMPLICATION, WITH LONG-TERM CURRENT USE OF INSULIN: Primary | ICD-10-CM

## 2023-12-28 DIAGNOSIS — I25.10 CORONARY ARTERY DISEASE INVOLVING NATIVE CORONARY ARTERY OF NATIVE HEART WITHOUT ANGINA PECTORIS: ICD-10-CM

## 2023-12-28 DIAGNOSIS — Q23.1 BICUSPID AORTIC VALVE: ICD-10-CM

## 2023-12-28 DIAGNOSIS — I71.9 AORTIC ANEURYSM, UNSPECIFIED PORTION OF AORTA, UNSPECIFIED WHETHER RUPTURED: ICD-10-CM

## 2023-12-28 DIAGNOSIS — I10 PRIMARY HYPERTENSION: ICD-10-CM

## 2023-12-28 LAB — HBA1C MFR BLD: 7.6 %

## 2023-12-28 PROCEDURE — 99215 OFFICE O/P EST HI 40 MIN: CPT | Mod: PBBFAC | Performed by: STUDENT IN AN ORGANIZED HEALTH CARE EDUCATION/TRAINING PROGRAM

## 2023-12-28 RX ORDER — NYSTATIN 100000 [USP'U]/G
POWDER TOPICAL 4 TIMES DAILY
Qty: 30 G | Refills: 0 | Status: SHIPPED | OUTPATIENT
Start: 2023-12-28

## 2023-12-28 NOTE — PROGRESS NOTES
[facility] INTERNAL MEDICINE  OUTPATIENT OFFICE VISIT NOTE    SUBJECTIVE:      Chief Complaint: Follow-up (Pt has no concerns. Here for f/u check-up.)       HPI: Duran Espinoza is a 73 y.o. yo male w/ PMH of  has a past medical history of Aneurysm of ascending aorta, Cataract, CKD, DM, HFpEF, and HTN., who presents for follow up and medication refill.  Patient has been doing well since his follow up.  Reports has been tolerating his medications well.  No episodes of chest pain, shortness for breath, nausea, vomiting, diarrhea, constipation, hematochezia, hematemesis, hematuria.  Does report that he has been eating more sweets during the holidays and does feel like his sugar has been running somewhat high recently.  Does report periodic episodes of paresthesias in his feet when he does notice in his sugars running high.  Reports that these paresthesias or few and far between and not persistent.  Reports that this is not happening currently.  POC A1c today is 7.6%.  Patient with no other complaints at this time.  Patient in good spirits.        Past Medical History:   has a past medical history of Aneurysm of ascending aorta, Cataract, CKD, DM, HFpEF, and HTN.     Past Surgical History:   has a past surgical history that includes stitches (09/14/2022).     Family History:  family history includes Diabetes in his brother; Hypertension in his father.     Social History:   reports that he has never smoked. He has never used smokeless tobacco. He reports that he does not currently use alcohol. He reports that he does not use drugs.     Allergies:  has No Known Allergies.     Home Medications:  Prior to Admission medications    Medication Sig Start Date End Date Taking? Authorizing Provider   ascorbic acid, vitamin C, (VITAMIN C) 1000 MG tablet Take 1,000 mg by mouth once daily. 10/4/21  Yes Historical Provider   aspirin (ECOTRIN) 81 MG EC tablet Take 81 mg by mouth once daily.   Yes Historical Provider   atorvastatin  (LIPITOR) 40 MG tablet Take 40 mg by mouth once daily. 2/9/22  Yes Historical Provider   blood sugar diagnostic Strp Use to check blood sugar once a day 6/1/22  Yes Amber Mittal MD   metFORMIN (GLUCOPHAGE) 1000 MG tablet Take 1,000 mg by mouth 2 (two) times daily. 2/12/22  Yes Historical Provider   vitamin D (VITAMIN D3) 1000 units Tab Take 1,000 Units by mouth once daily.   Yes Historical Provider   amLODIPine (NORVASC) 10 MG tablet Take 10 mg by mouth once daily. for 90 days 4/11/22 11/28/22 Yes Historical Provider   lisinopriL (PRINIVIL,ZESTRIL) 20 MG tablet Take 20 mg by mouth once daily. for 90 days 4/18/22 11/28/22 Yes Historical Provider   TRADJENTA 5 mg Tab tablet Take 5 mg by mouth once daily. 3/4/22 11/28/22 Yes Historical Provider   amLODIPine (NORVASC) 10 MG tablet Take 1 tablet (10 mg total) by mouth once daily. for 90 days 11/28/22 11/23/23  Lennie Casillas MD   lisinopriL (PRINIVIL,ZESTRIL) 20 MG tablet Take 1 tablet (20 mg total) by mouth once daily. for 90 days 11/28/22 8/25/23  Lennie Casillas MD   TRADJENTA 5 mg Tab tablet Take 1 tablet (5 mg total) by mouth once daily. 11/28/22 8/25/23  Lennie Casillas MD       ROS:  Constitutional: no fever, fatigue, weakness  Eye: no vision loss, eye redness, drainage, or pain  ENMT: no sore throat, ear pain, sinus pain/congestion, nasal congestion/drainage  Respiratory: no cough, no wheezing, no shortness of breath  Cardiovascular: no chest pain, no palpitations, no edema  Gastrointestinal: no nausea, vomiting, or diarrhea. No abdominal pain  Genitourinary: no dysuria, no urinary frequency or urgency, no hematuria  Hema/Lymph: no abnormal bruising or bleeding  Endocrine: no heat or cold intolerance, no excessive thirst or excessive urination  Musculoskeletal: no muscle or joint pain, left index finger paresthesia and tingling  Integumentary: no skin rash or abnormal lesion  Neurologic: no headache, no dizziness, no weakness or numbness         OBJECTIVE:     Vital  "signs:   /88 (BP Location: Right arm, Patient Position: Sitting, BP Method: Medium (Automatic))   Pulse 65   Temp 97.8 °F (36.6 °C) (Oral)   Resp 20   Ht 5' 9" (1.753 m)   Wt 74.8 kg (164 lb 12.8 oz)   SpO2 98%   BMI 24.34 kg/m²      Physical Examination:  General appearance: alert, appears stated age, thin, cooperative and no distress  Head: Normocephalic, without obvious abnormality, atraumatic  Neck: no adenopathy, no carotid bruit, no JVD and supple, symmetrical, trachea midline  Lungs: clear to auscultation bilaterally  Heart: regular rate and rhythm, S1, S2 normal, systolic murmur, no click, rub or gallop  Abdomen: soft, non-tender; bowel sounds normal; no masses,  no organomegaly  Extremities: extremities normal, atraumatic, no cyanosis or edema  Pulses: 2+ and symmetric  Skin: Skin color, texture, turgor normal. No rashes or lesions  feet bilaterally with no lesions, rashes, motion intact, pulses 2+ in DP and PT, sensation intact to soft touch and pin prick on dorsal and plantar aspect  Neurologic: Grossly normal        Labs:  CMP:   Lab Results   Component Value Date    GLUCOSE 181 (H) 10/10/2023    CALCIUM 10.1 (H) 10/10/2023    ALBUMIN 4.0 10/10/2023     10/10/2023    K 4.1 10/10/2023    CO2 28 10/10/2023    BUN 19.9 10/10/2023    CREATININE 1.28 (H) 10/10/2023    ALKPHOS 56 10/10/2023    ALT 25 10/10/2023    AST 21 10/10/2023    BILITOT 0.5 10/10/2023      CBC:   Lab Results   Component Value Date    WBC 4.01 (L) 10/10/2023    HGB 13.2 (L) 10/10/2023    HCT 40.9 (L) 10/10/2023    MCV 96.0 (H) 10/10/2023    RDW 13.4 10/10/2023     FLP:   Lab Results   Component Value Date    CHOL 145 11/28/2022    HDL 64 (H) 11/28/2022    LDL 76.00 11/28/2022    TRIG 23 (L) 11/28/2022    TOTALCHOLEST 2 11/28/2022     DM:   Lab Results   Component Value Date    HGBA1C 6.9 03/21/2023    .3 03/21/2023    CREATININE 1.28 (H) 10/10/2023    CREATRANDUR 55.0 (L) 10/10/2023    PROTEINURINE <6.8 " 10/10/2023     Anemia:   Lab Results   Component Value Date    IRON 80 10/10/2023    TIBC 297 10/10/2023    FERRITIN 73.91 10/10/2023    JEZIADQQ14 293 09/29/2020    FOLATE 14.7 02/27/2019         ASSESSMENT & PLAN:     Aneurysm in ascending aorta- Stable  Possibly tertiary syphilis manifestation  4.6 cm on CT thorax in 9/2018  4.7 cm on CT thorax in 10/2019  echocardiogram 9/2018-EF 60%  advised on good control of BP (systolic goal 110-120)  following a Cardiologist in [place]. Last visit on 11/2021.  Per patient at 11/2022 C visit, [place] states aneurysm is stable per routine imaging with no indication for surgery.   Seen by Kurtis Pollock in 4/2023: TTE with moderate dilated ascending aorta, stable with no plans for surgical intervention at this time    Tertiary syphilis- treated  Syphilis positive on 2/2021- dils 2, repeat on 6/2021 with dils 0  h/o Aortic aneurysm, possibly tertiary involvement  s/p 3x Bicillin injections completed    HTN  137/88 today  cont Norvasc to 10 mg daily, lisinopril 20 mg daily  advised to keep a BP log, and to follow low salt diet    DM  Well controlled  A1c 6.9% previously --> 7.6%  Discussed lifestyle modifications, referral to diabetic education  Should A1c remian elevated at f/u, can consider 2nd diabetic agent  urine microalbumin/creatinine wnl in 11/2022. Currently on ACEI  Continue on metformin to 1000 mg bid, Tradjenta 5mg daily.  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   HM labs (CBC, CMP, FLP, A1c, TSH, vitD): UTD 11/28/2022  HIV/Hep panel/syphilis: HIV/Hep neg in 10/2021, Syph reactive 6/2021 s/p treatment  Pneumococcal vaccine: UTD, PSV23 2021, PSV13 2016  Tdap: UTD, 2022  Zoster vaccine: UTD, 2021  Flu: UTD, 11/2022  FIT/colonoscopy: cologuard negative 7/2022  Lung cancer screening (LDCT age 50-80): na  AAA screening (men, age 65-75, smoking history): never smoker      Return to clinic in 3 month(s).    Dustin Fowler, DO  [facility], [facility]  12/28/2023      Orders Placed This Encounter    Hemoglobin A1C    Comprehensive Metabolic Panel    CBC Auto Differential    Lipid Panel    Protein/Creatinine Ratio, Urine    Ambulatory referral/consult to Diabetes Education    Hemoglobin A1C, POCT    nystatin (MYCOSTATIN) powder

## 2023-12-28 NOTE — TELEPHONE ENCOUNTER
[facility] requesting easy touch twist 33G Lancets 100 each. Med not in pt med list to propose

## 2024-01-19 DIAGNOSIS — N40.1 BENIGN PROSTATIC HYPERPLASIA WITH URINARY FREQUENCY: Primary | ICD-10-CM

## 2024-01-19 DIAGNOSIS — R35.0 BENIGN PROSTATIC HYPERPLASIA WITH URINARY FREQUENCY: Primary | ICD-10-CM

## 2024-02-15 ENCOUNTER — LAB VISIT (OUTPATIENT)
Dept: LAB | Facility: HOSPITAL | Age: 74
End: 2024-02-15
Attending: NURSE PRACTITIONER
Payer: MEDICARE

## 2024-02-15 ENCOUNTER — OFFICE VISIT (OUTPATIENT)
Dept: UROLOGY | Facility: CLINIC | Age: 74
End: 2024-02-15
Payer: MEDICARE

## 2024-02-15 VITALS
RESPIRATION RATE: 20 BRPM | OXYGEN SATURATION: 100 % | SYSTOLIC BLOOD PRESSURE: 144 MMHG | HEART RATE: 54 BPM | WEIGHT: 157 LBS | HEIGHT: 69 IN | TEMPERATURE: 98 F | BODY MASS INDEX: 23.25 KG/M2 | DIASTOLIC BLOOD PRESSURE: 75 MMHG

## 2024-02-15 DIAGNOSIS — N40.1 BENIGN PROSTATIC HYPERPLASIA WITH URINARY FREQUENCY: ICD-10-CM

## 2024-02-15 DIAGNOSIS — R35.0 BENIGN PROSTATIC HYPERPLASIA WITH URINARY FREQUENCY: ICD-10-CM

## 2024-02-15 DIAGNOSIS — R35.0 BENIGN PROSTATIC HYPERPLASIA WITH URINARY FREQUENCY: Primary | ICD-10-CM

## 2024-02-15 DIAGNOSIS — N40.1 BENIGN PROSTATIC HYPERPLASIA WITH URINARY FREQUENCY: Primary | ICD-10-CM

## 2024-02-15 LAB
BILIRUB SERPL-MCNC: NEGATIVE MG/DL
BLOOD URINE, POC: NEGATIVE
COLOR, POC UA: YELLOW
GLUCOSE UR QL STRIP: NEGATIVE
KETONES UR QL STRIP: NEGATIVE
LEUKOCYTE ESTERASE URINE, POC: NEGATIVE
NITRITE, POC UA: NEGATIVE
PH, POC UA: 6
POC RESIDUAL URINE VOLUME: 85 ML (ref 0–100)
PROTEIN, POC: NEGATIVE
PSA SERPL-MCNC: 1.69 NG/ML
SPECIFIC GRAVITY, POC UA: 1.02
UROBILINOGEN, POC UA: 0.2

## 2024-02-15 PROCEDURE — 99214 OFFICE O/P EST MOD 30 MIN: CPT | Mod: PBBFAC,25 | Performed by: NURSE PRACTITIONER

## 2024-02-15 PROCEDURE — 1159F MED LIST DOCD IN RCRD: CPT | Mod: CPTII,,, | Performed by: NURSE PRACTITIONER

## 2024-02-15 PROCEDURE — 51798 US URINE CAPACITY MEASURE: CPT | Mod: PBBFAC | Performed by: NURSE PRACTITIONER

## 2024-02-15 PROCEDURE — 3077F SYST BP >= 140 MM HG: CPT | Mod: CPTII,,, | Performed by: NURSE PRACTITIONER

## 2024-02-15 PROCEDURE — 84153 ASSAY OF PSA TOTAL: CPT

## 2024-02-15 PROCEDURE — 3072F LOW RISK FOR RETINOPATHY: CPT | Mod: CPTII,,, | Performed by: NURSE PRACTITIONER

## 2024-02-15 PROCEDURE — 1126F AMNT PAIN NOTED NONE PRSNT: CPT | Mod: CPTII,,, | Performed by: NURSE PRACTITIONER

## 2024-02-15 PROCEDURE — 3078F DIAST BP <80 MM HG: CPT | Mod: CPTII,,, | Performed by: NURSE PRACTITIONER

## 2024-02-15 PROCEDURE — 99213 OFFICE O/P EST LOW 20 MIN: CPT | Mod: S$PBB,,, | Performed by: NURSE PRACTITIONER

## 2024-02-15 PROCEDURE — 81001 URINALYSIS AUTO W/SCOPE: CPT | Mod: PBBFAC | Performed by: NURSE PRACTITIONER

## 2024-02-15 PROCEDURE — 36415 COLL VENOUS BLD VENIPUNCTURE: CPT

## 2024-02-15 PROCEDURE — 1101F PT FALLS ASSESS-DOCD LE1/YR: CPT | Mod: CPTII,,, | Performed by: NURSE PRACTITIONER

## 2024-02-15 PROCEDURE — 3008F BODY MASS INDEX DOCD: CPT | Mod: CPTII,,, | Performed by: NURSE PRACTITIONER

## 2024-02-15 PROCEDURE — 3288F FALL RISK ASSESSMENT DOCD: CPT | Mod: CPTII,,, | Performed by: NURSE PRACTITIONER

## 2024-02-15 RX ORDER — FINASTERIDE 5 MG/1
5 TABLET, FILM COATED ORAL DAILY
Qty: 90 TABLET | Refills: 3 | Status: SHIPPED | OUTPATIENT
Start: 2024-02-15 | End: 2025-02-14

## 2024-02-15 RX ORDER — TAMSULOSIN HYDROCHLORIDE 0.4 MG/1
0.8 CAPSULE ORAL DAILY
Qty: 180 CAPSULE | Refills: 3 | Status: SHIPPED | OUTPATIENT
Start: 2024-02-15 | End: 2025-02-14

## 2024-02-15 NOTE — PROGRESS NOTES
Chief Complaint:   Chief Complaint   Patient presents with    6 month followup BPH, Urinary Frequency       HPI: Patient is a 72-year-old male here for six-month follow-up for urinary urgency, urinary frequency, BPH. Patient on tamsulosin 0.4 mg p.o. daily patient denies any urinary frequency, urinary urgency, has good urine stream and without the dysuria, urinary retention, gross hematuria, urinary hesitation.  Last PSA 1.94.  Current PSA pending.  Bladder scan 85 mL.  NABOR exam as noted below.  Plan PSA now will notify patient results, increase tamsulosin to 0.8 mg p.o. daily, start Proscar 5 mg p.o. daily RTC 3 months for re-evaluation.      Allergies:  Review of patient's allergies indicates:  No Known Allergies    Medications:  Current Outpatient Medications   Medication Sig Dispense Refill    amLODIPine (NORVASC) 10 MG tablet Take 1 tablet (10 mg total) by mouth once daily. for 90 days 90 tablet 3    aspirin (ECOTRIN) 81 MG EC tablet Take 1 tablet (81 mg total) by mouth once daily. 90 tablet 3    atorvastatin (LIPITOR) 40 MG tablet Take 1 tablet (40 mg total) by mouth once daily. 90 tablet 3    blood sugar diagnostic Strp Use to check blood sugar once a day 100 each 3    blood-glucose meter kit by Other route. Use as instructed      lancets 32 gauge Misc 1 lancet  by Misc.(Non-Drug; Combo Route) route 2 times daily 2 hours after meal. 100 each 5    lisinopriL (PRINIVIL,ZESTRIL) 20 MG tablet Take 1 tablet (20 mg total) by mouth once daily. for 90 days 90 tablet 3    metFORMIN (GLUCOPHAGE) 1000 MG tablet Take 1 tablet (1,000 mg total) by mouth 2 (two) times daily. 180 tablet 3    nystatin (MYCOSTATIN) powder Apply topically 4 (four) times daily. 30 g 0    tamsulosin (FLOMAX) 0.4 mg Cap Take 1 capsule (0.4 mg total) by mouth once daily. 90 capsule 3    TRADJENTA 5 mg Tab tablet Take 1 tablet (5 mg total) by mouth once daily. 90 tablet 3    vitamin D (VITAMIN D3) 1000 units Tab Take 1 tablet (1,000 Units total) by  mouth once daily. 90 tablet 3     No current facility-administered medications for this visit.       Review of Systems:  General: No fever, chills, fatigability, or weight loss.  Skin: No rashes, itching, or changes in color or texture of skin.  Chest: Denies HYMAN, cyanosis, wheezing, cough, and sputum production.  Abdomen: Appetite fine. No weight loss. Denies diarrhea, abdominal pain, hematemesis, or blood in stool.  Musculoskeletal: No joint stiffness or swelling. Denies back pain.  : As above.  All other review of systems negative.    PMH:  Past Medical History:   Diagnosis Date    Aneurysm of ascending aorta     Cataract     CKD     DM     HFpEF     HTN        PSH:  Past Surgical History:   Procedure Laterality Date    stitches  09/14/2022    left fore finger       FamHx:  Family History   Problem Relation Age of Onset    Hypertension Father     Diabetes Brother        SocHx:  Social History     Socioeconomic History    Marital status:     Number of children: 2   Tobacco Use    Smoking status: Never    Smokeless tobacco: Never   Substance and Sexual Activity    Alcohol use: Not Currently    Drug use: Never    Sexual activity: Not Currently     Social Determinants of Health     Financial Resource Strain: Low Risk  (6/16/2022)    Overall Financial Resource Strain (CARDIA)     Difficulty of Paying Living Expenses: Not hard at all   Food Insecurity: No Food Insecurity (6/16/2022)    Hunger Vital Sign     Worried About Running Out of Food in the Last Year: Never true     Ran Out of Food in the Last Year: Never true   Transportation Needs: No Transportation Needs (6/16/2022)    PRAPARE - Transportation     Lack of Transportation (Medical): No     Lack of Transportation (Non-Medical): No   Physical Activity: Inactive (6/16/2022)    Exercise Vital Sign     Days of Exercise per Week: 0 days     Minutes of Exercise per Session: 0 min   Stress: No Stress Concern Present (6/16/2022)    [employer] of  "Occupational Health - Occupational Stress Questionnaire     Feeling of Stress : Not at all   Social Connections: Moderately Integrated (6/16/2022)    Social Connection and Isolation Panel [NHANES]     Frequency of Communication with Friends and Family: More than three times a week     Frequency of Social Gatherings with Friends and Family: Never     Attends Mandaen Services: More than 4 times per year     Active Member of Clubs or Organizations: No     Attends Club or Organization Meetings: Never     Marital Status:    Housing Stability: Low Risk  (6/16/2022)    Housing Stability Vital Sign     Unable to Pay for Housing in the Last Year: No     Number of Places Lived in the Last Year: 1     Unstable Housing in the Last Year: No       Physical Exam:  Vitals:    02/15/24 0744   BP: (!) 144/75   Pulse: (!) 54   Resp: 20   Temp: 98.1 °F (36.7 °C)     General: A&Ox3, no apparent distress, no deformities  Neck: No masses, normal thyroid  Lungs: CTA angela, no use of accessory muscles  Heart: RRR, no arrhythmias  Abdomen: Soft, NT, ND, no masses, no hernias, no hepatosplenomegaly  Lymphatic: Neck and groin nodes negative  Skin: The skin is warm and dry. No jaundice.  Ext: No c/c/e.    GUMale: Test desc angela, no abnormalities of epididymus. Penis circumcised, with normal penile and scrotal skin. Meatus normal. Normal rectal tone, no hemorrhoids. Prostate: 2 finger breadth wide, smooth, 1 fingerbreadth thick, soft, nontender, no nodules or masses appreciated. SV not palpable. Perineum and anus normal.    Labs:  Pending PSA   Latest Reference Range & Units 02/02/23 10:08 08/10/23 09:50   Prostate Specific Antigen <=4.00 ng/mL 2.44 1.94       No results for input(s): "PSA" in the last 760 hours.   No results for input(s): "TESTOSTERONE" in the last 760 hours.   No results for input(s): "CREATININE" in the last 760 hours.   No results for input(s): "CBC" in the last 760 hours.   No results for input(s): "FSH" in the last " "760 hours.   No results for input(s): "LH" in the last 760 hours.   No results for input(s): "PROLACTIN" in the last 760 hours.   Invalid input(s): "URINE CULTURE"  No results for input(s): "ESTRADIOL" in the last 760 hours.    Urinalysis:  Results for orders placed or performed in visit on 02/15/24   POCT URINE DIPSTICK WITH MICROSCOPE, AUTOMATED   Result Value Ref Range    Color, UA Yellow     Spec Grav UA 1.020     pH, UA 6.0     WBC, UA Negative     Nitrite, UA Negative     Protein, POC Negative     Glucose, UA Negative     Ketones, UA Negative     Urobilinogen, UA 0.2     Bilirubin, POC Negative     Blood, UA Negative    Microscopic urinalysis negative for WBCs, nitrites, RBCs.            Impression:  1. Benign prostatic hyperplasia with urinary frequency  - POCT URINE DIPSTICK WITH MICROSCOPE, AUTOMATED  - POCT Bladder Scan      Plan: Instructed patient to increase tamsulosin to 0.8 mg p.o. daily, start Proscar 5 mg p.o. daily RTC 3 months for re-evaluation.  Will notify patient of current PSA results on timed voiding every 2-3 hrs, double voiding, avoidance of bladder irritants such as alcohol, citrus foods, chocolate, caffeinated drinks, energy drinks, spicy foods, sugar, caffeine products, sodas. Instructed patient to avoid drinking fluids 1-2 hours prior to bedtime & void immediately before bedtime.   "

## 2024-02-15 NOTE — PROGRESS NOTES
Seen by HANG Stanford NP.  Bladder scan done result 85ml.  RTC 3 months.  Rx routed to pharmacy.  PSA today.

## 2024-03-26 DIAGNOSIS — E11.9 TYPE 2 DIABETES MELLITUS WITHOUT COMPLICATION, WITHOUT LONG-TERM CURRENT USE OF INSULIN: ICD-10-CM

## 2024-03-26 RX ORDER — LINAGLIPTIN 5 MG/1
5 TABLET, FILM COATED ORAL DAILY
Qty: 90 TABLET | Refills: 3 | Status: SHIPPED | OUTPATIENT
Start: 2024-03-26 | End: 2025-03-26

## 2024-04-02 DIAGNOSIS — I10 HYPERTENSION, UNSPECIFIED TYPE: ICD-10-CM

## 2024-04-02 RX ORDER — AMLODIPINE BESYLATE 10 MG/1
10 TABLET ORAL DAILY
Qty: 90 TABLET | Refills: 3 | Status: SHIPPED | OUTPATIENT
Start: 2024-04-02 | End: 2025-03-28

## 2024-04-04 DIAGNOSIS — I10 HYPERTENSION, UNSPECIFIED TYPE: ICD-10-CM

## 2024-04-04 RX ORDER — AMLODIPINE BESYLATE 10 MG/1
10 TABLET ORAL DAILY
Qty: 90 TABLET | Refills: 3 | OUTPATIENT
Start: 2024-04-04 | End: 2025-03-30

## 2024-04-04 NOTE — TELEPHONE ENCOUNTER
Called [facility] and spoke with the pharmacist in [place] at [place]. Pt amlodipine was refill since on 4/2/2024. Arjun reported she will call patient and inform mediation is ready for pick-up. No further questions or concerns noted. Call ended.

## 2024-05-13 ENCOUNTER — LAB VISIT (OUTPATIENT)
Dept: LAB | Facility: HOSPITAL | Age: 74
End: 2024-05-13
Attending: STUDENT IN AN ORGANIZED HEALTH CARE EDUCATION/TRAINING PROGRAM
Payer: MEDICARE

## 2024-05-13 ENCOUNTER — OFFICE VISIT (OUTPATIENT)
Dept: INTERNAL MEDICINE | Facility: CLINIC | Age: 74
End: 2024-05-13
Payer: MEDICARE

## 2024-05-13 VITALS
RESPIRATION RATE: 18 BRPM | OXYGEN SATURATION: 98 % | WEIGHT: 157.5 LBS | BODY MASS INDEX: 23.33 KG/M2 | HEART RATE: 57 BPM | DIASTOLIC BLOOD PRESSURE: 71 MMHG | SYSTOLIC BLOOD PRESSURE: 130 MMHG | TEMPERATURE: 98 F

## 2024-05-13 DIAGNOSIS — N18.31 CKD STAGE G3A/A1, GFR 45-59 AND ALBUMIN CREATININE RATIO <30 MG/G: ICD-10-CM

## 2024-05-13 DIAGNOSIS — I10 PRIMARY HYPERTENSION: ICD-10-CM

## 2024-05-13 DIAGNOSIS — D53.9 MACROCYTIC ANEMIA: ICD-10-CM

## 2024-05-13 DIAGNOSIS — E11.9 TYPE 2 DIABETES MELLITUS WITHOUT COMPLICATION, WITHOUT LONG-TERM CURRENT USE OF INSULIN: Primary | ICD-10-CM

## 2024-05-13 DIAGNOSIS — E11.9 TYPE 2 DIABETES MELLITUS WITHOUT COMPLICATION, WITHOUT LONG-TERM CURRENT USE OF INSULIN: ICD-10-CM

## 2024-05-13 LAB
ALBUMIN SERPL-MCNC: 3.8 G/DL (ref 3.4–4.8)
ALBUMIN/GLOB SERPL: 1.2 RATIO (ref 1.1–2)
ALP SERPL-CCNC: 52 UNIT/L (ref 40–150)
ALT SERPL-CCNC: 26 UNIT/L (ref 0–55)
AST SERPL-CCNC: 21 UNIT/L (ref 5–34)
BACTERIA #/AREA URNS AUTO: ABNORMAL /HPF
BASOPHILS # BLD AUTO: 0.02 X10(3)/MCL
BASOPHILS NFR BLD AUTO: 0.5 %
BILIRUB SERPL-MCNC: 0.4 MG/DL
BILIRUB UR QL STRIP.AUTO: NEGATIVE
BUN SERPL-MCNC: 24.2 MG/DL (ref 8.4–25.7)
CALCIUM SERPL-MCNC: 10 MG/DL (ref 8.8–10)
CHLORIDE SERPL-SCNC: 108 MMOL/L (ref 98–107)
CHOLEST SERPL-MCNC: 151 MG/DL
CHOLEST/HDLC SERPL: 2 {RATIO} (ref 0–5)
CLARITY UR: CLEAR
CO2 SERPL-SCNC: 26 MMOL/L (ref 23–31)
COLOR UR AUTO: COLORLESS
CREAT SERPL-MCNC: 1.37 MG/DL (ref 0.73–1.18)
CREAT UR-MCNC: 33.3 MG/DL (ref 63–166)
EOSINOPHIL # BLD AUTO: 0.1 X10(3)/MCL (ref 0–0.9)
EOSINOPHIL NFR BLD AUTO: 2.4 %
ERYTHROCYTE [DISTWIDTH] IN BLOOD BY AUTOMATED COUNT: 13.4 % (ref 11.5–17)
EST. AVERAGE GLUCOSE BLD GHB EST-MCNC: 145.6 MG/DL
GFR SERPLBLD CREATININE-BSD FMLA CKD-EPI: 54 ML/MIN/1.73/M2
GLOBULIN SER-MCNC: 3.2 GM/DL (ref 2.4–3.5)
GLUCOSE SERPL-MCNC: 171 MG/DL (ref 82–115)
GLUCOSE UR QL STRIP: ABNORMAL
HBA1C MFR BLD: 6.7 %
HCT VFR BLD AUTO: 39.4 % (ref 42–52)
HDLC SERPL-MCNC: 67 MG/DL (ref 35–60)
HGB BLD-MCNC: 13 G/DL (ref 14–18)
HGB UR QL STRIP: NEGATIVE
HYALINE CASTS #/AREA URNS LPF: ABNORMAL /LPF
IMM GRANULOCYTES # BLD AUTO: 0.01 X10(3)/MCL (ref 0–0.04)
IMM GRANULOCYTES NFR BLD AUTO: 0.2 %
KETONES UR QL STRIP: NEGATIVE
LDLC SERPL CALC-MCNC: 80 MG/DL (ref 50–140)
LEUKOCYTE ESTERASE UR QL STRIP: NEGATIVE
LYMPHOCYTES # BLD AUTO: 1 X10(3)/MCL (ref 0.6–4.6)
LYMPHOCYTES NFR BLD AUTO: 23.9 %
MCH RBC QN AUTO: 31.3 PG (ref 27–31)
MCHC RBC AUTO-ENTMCNC: 33 G/DL (ref 33–36)
MCV RBC AUTO: 94.7 FL (ref 80–94)
MONOCYTES # BLD AUTO: 0.41 X10(3)/MCL (ref 0.1–1.3)
MONOCYTES NFR BLD AUTO: 9.8 %
NEUTROPHILS # BLD AUTO: 2.64 X10(3)/MCL (ref 2.1–9.2)
NEUTROPHILS NFR BLD AUTO: 63.2 %
NITRITE UR QL STRIP: NEGATIVE
NRBC BLD AUTO-RTO: 0 %
PH UR STRIP: 6 [PH]
PLATELET # BLD AUTO: 198 X10(3)/MCL (ref 130–400)
PMV BLD AUTO: 10.3 FL (ref 7.4–10.4)
POTASSIUM SERPL-SCNC: 4.3 MMOL/L (ref 3.5–5.1)
PROT SERPL-MCNC: 7 GM/DL (ref 5.8–7.6)
PROT UR QL STRIP: NEGATIVE
PROT UR STRIP-MCNC: <6.8 MG/DL
RBC # BLD AUTO: 4.16 X10(6)/MCL (ref 4.7–6.1)
RBC #/AREA URNS AUTO: ABNORMAL /HPF
SODIUM SERPL-SCNC: 139 MMOL/L (ref 136–145)
SP GR UR STRIP.AUTO: 1.01 (ref 1–1.03)
SQUAMOUS #/AREA URNS LPF: ABNORMAL /HPF
TRIGL SERPL-MCNC: 18 MG/DL (ref 34–140)
UROBILINOGEN UR STRIP-ACNC: NORMAL
VLDLC SERPL CALC-MCNC: 4 MG/DL
WBC # SPEC AUTO: 4.18 X10(3)/MCL (ref 4.5–11.5)
WBC #/AREA URNS AUTO: ABNORMAL /HPF

## 2024-05-13 PROCEDURE — 85025 COMPLETE CBC W/AUTO DIFF WBC: CPT

## 2024-05-13 PROCEDURE — 80053 COMPREHEN METABOLIC PANEL: CPT

## 2024-05-13 PROCEDURE — 81001 URINALYSIS AUTO W/SCOPE: CPT

## 2024-05-13 PROCEDURE — 84156 ASSAY OF PROTEIN URINE: CPT

## 2024-05-13 PROCEDURE — 83036 HEMOGLOBIN GLYCOSYLATED A1C: CPT

## 2024-05-13 PROCEDURE — 36415 COLL VENOUS BLD VENIPUNCTURE: CPT

## 2024-05-13 PROCEDURE — 80061 LIPID PANEL: CPT

## 2024-05-13 PROCEDURE — 99213 OFFICE O/P EST LOW 20 MIN: CPT | Mod: PBBFAC | Performed by: STUDENT IN AN ORGANIZED HEALTH CARE EDUCATION/TRAINING PROGRAM

## 2024-05-13 PROCEDURE — 82570 ASSAY OF URINE CREATININE: CPT

## 2024-05-13 RX ORDER — METFORMIN HYDROCHLORIDE 1000 MG/1
1000 TABLET ORAL 2 TIMES DAILY
Qty: 180 TABLET | Refills: 3 | Status: SHIPPED | OUTPATIENT
Start: 2024-05-13 | End: 2025-05-13

## 2024-05-13 RX ORDER — ATORVASTATIN CALCIUM 40 MG/1
40 TABLET, FILM COATED ORAL DAILY
Qty: 90 TABLET | Refills: 3 | Status: SHIPPED | OUTPATIENT
Start: 2024-05-13 | End: 2025-05-13

## 2024-05-13 NOTE — PROGRESS NOTES
[facility] INTERNAL MEDICINE  OUTPATIENT OFFICE VISIT NOTE    SUBJECTIVE:      Chief Complaint: Follow-up (Medication Management)       HPI: Duran Espinoza is a 73 y.o. yo male w/ PMH of  has a past medical history of Aneurysm of ascending aorta, Cataract, CKD, DM, HFpEF, and HTN., who presents for follow up and medication refill.      Today, patient denies any complaints. Has an upcoming appointment in [place] for stable anuerysm.      Past Medical History:   has a past medical history of Aneurysm of ascending aorta, Cataract, CKD, DM, HFpEF, and HTN.     Past Surgical History:   has a past surgical history that includes stitches (09/14/2022).     Family History:  family history includes Diabetes in his brother; Hypertension in his father.     Social History:   reports that he has never smoked. He has never used smokeless tobacco. He reports that he does not currently use alcohol. He reports that he does not use drugs.     Allergies:  has No Known Allergies.     Home Medications:  Prior to Admission medications    Medication Sig Start Date End Date Taking? Authorizing Provider   ascorbic acid, vitamin C, (VITAMIN C) 1000 MG tablet Take 1,000 mg by mouth once daily. 10/4/21  Yes Historical Provider   aspirin (ECOTRIN) 81 MG EC tablet Take 81 mg by mouth once daily.   Yes Historical Provider   atorvastatin (LIPITOR) 40 MG tablet Take 40 mg by mouth once daily. 2/9/22  Yes Historical Provider   blood sugar diagnostic Strp Use to check blood sugar once a day 6/1/22  Yes Amber Mittal MD   metFORMIN (GLUCOPHAGE) 1000 MG tablet Take 1,000 mg by mouth 2 (two) times daily. 2/12/22  Yes Historical Provider   vitamin D (VITAMIN D3) 1000 units Tab Take 1,000 Units by mouth once daily.   Yes Historical Provider   amLODIPine (NORVASC) 10 MG tablet Take 10 mg by mouth once daily. for 90 days 4/11/22 11/28/22 Yes Historical Provider   lisinopriL (PRINIVIL,ZESTRIL) 20 MG tablet Take 20 mg by mouth once daily. for 90 days 4/18/22 11/28/22  Yes Historical Provider   TRADJENTA 5 mg Tab tablet Take 5 mg by mouth once daily. 3/4/22 11/28/22 Yes Historical Provider   amLODIPine (NORVASC) 10 MG tablet Take 1 tablet (10 mg total) by mouth once daily. for 90 days 11/28/22 11/23/23  Lennie Casillas MD   lisinopriL (PRINIVIL,ZESTRIL) 20 MG tablet Take 1 tablet (20 mg total) by mouth once daily. for 90 days 11/28/22 8/25/23  Lennie Casillas MD   TRADJENTA 5 mg Tab tablet Take 1 tablet (5 mg total) by mouth once daily. 11/28/22 8/25/23  Lennie Casillas MD       ROS:  Negative unless otherwise noted above.       OBJECTIVE:     Vital signs:   /71 (BP Location: Left arm, Patient Position: Sitting, BP Method: Large (Automatic))   Pulse (!) 57   Temp 98.4 °F (36.9 °C) (Oral)   Resp 18   Wt 71.4 kg (157 lb 8 oz)   SpO2 98%   BMI 23.33 kg/m²      Physical Examination:  General appearance: alert, appears stated age, thin, cooperative and no distress  Head: Normocephalic, without obvious abnormality, atraumatic  Lungs: clear to auscultation bilaterally  Heart: regular rate, S1, S2 normal, systolic murmur, no click, rub or gallop  Abdomen: soft, non-tender; bowel sounds normal; no masses,  no organomegaly  Extremities: extremities normal, atraumatic, no cyanosis or edema  Skin: Skin color, texture, turgor normal. No rashes or lesions  Neurologic: Grossly normal    Diabetic foot exam 12/2023: feet bilaterally with no lesions, rashes, motion intact, pulses 2+ in DP and PT, sensation intact to soft touch and pin prick on dorsal and plantar aspect      Labs:  CMP:   Lab Results   Component Value Date    GLUCOSE 181 (H) 10/10/2023    CALCIUM 10.1 (H) 10/10/2023    ALBUMIN 4.0 10/10/2023     10/10/2023    K 4.1 10/10/2023    CO2 28 10/10/2023    BUN 19.9 10/10/2023    CREATININE 1.28 (H) 10/10/2023    ALKPHOS 56 10/10/2023    ALT 25 10/10/2023    AST 21 10/10/2023    BILITOT 0.5 10/10/2023      CBC:   Lab Results   Component Value Date    WBC 4.01 (L) 10/10/2023    HGB  13.2 (L) 10/10/2023    HCT 40.9 (L) 10/10/2023    MCV 96.0 (H) 10/10/2023    RDW 13.4 10/10/2023     FLP:   Lab Results   Component Value Date    CHOL 145 11/28/2022    HDL 64 (H) 11/28/2022    LDL 76.00 11/28/2022    TRIG 23 (L) 11/28/2022    TOTALCHOLEST 2 11/28/2022     DM:   Lab Results   Component Value Date    HGBA1C 6.9 03/21/2023    .3 03/21/2023    CREATININE 1.28 (H) 10/10/2023    CREATRANDUR 55.0 (L) 10/10/2023    PROTEINURINE <6.8 10/10/2023     Anemia:   Lab Results   Component Value Date    IRON 80 10/10/2023    TIBC 297 10/10/2023    FERRITIN 73.91 10/10/2023    [id number] 293 09/29/2020    FOLATE 14.7 02/27/2019         ASSESSMENT & PLAN:     Aneurysm in ascending aorta- Stable  Possibly tertiary syphilis manifestation  4.6 cm on CT thorax in 9/2018  4.7 cm on CT thorax in 10/2019  echocardiogram 9/2018-EF 60%  advised on good control of BP (systolic goal 110-120)  following a Cardiologist in [place]. Last visit on 11/2021.  [place] [facility] [place] states aneurysm is stable per routine imaging with no indication for surgery.   Seen by Kurtis Pollock in 4/2023: TTE with moderate dilated ascending aorta, stable with no plans for surgical intervention at this time    Tertiary syphilis- treated  Syphilis positive on 2/2021- dils 2, repeat on 6/2021 with dils 0  h/o Aortic aneurysm, possibly tertiary involvement  s/p 3x Bicillin injections completed    HTN  130/71 today  cont Norvasc to 10 mg daily, lisinopril 20 mg daily  advised to keep a BP log, and to follow low salt diet    DM  Well controlled  A1c 6.7 in 5/2042, POC A1c 7.6 in 12/2023  Discussed lifestyle modifications, referral to diabetic education  Should A1c remian elevated at f/u, can consider 2nd diabetic agent  urine microalbumin/creatinine wnl 8/2023. Currently on ACEI  Continue on metformin to 1000 mg bid, Tradjenta 5mg daily.  Diabetic eye exam- follows ophtho, last seen 2/9/2023, no retinopathy, DM eye  exam ordered 5/13/2024  Diabetic foot exam done 12/2023  advised on maintaining a cbg log an following diabetic diet and daily exercise    CKD stage 2  At baseline poor PO water intake, has been encouraged to drink more every clinic visit  BUN 19.9, Cr 1.28, GFR 59 in 10/2023  Repeat CMP pending today  avoid nephrotoxic meds  Following with nephrology, appreciate their assistance    HFpEF  echo 9/2018-EF 60%  cont aspirin, acei, statin  holding bb due to hx of bradycardia    Chronic mild leukopenia, 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 ethnic leukopenia   Negative HIV, hep panel    BPH with urinary incontinency  PSA wnl 2/2023  Following with urology, appreciate their assistance  Continue tamsulosin 0.4 mg daily    Onicomycosis/toe fungus - resolved  - provided nystatin powder for feet bilaterally in 12/2023    Macrocytic anemia  - Hgb 13, Hct 39.4, MCV 94.7 in 5/2024  - Negative cologuard 2022, Colonoscopy with diverticulosis in 2/2024  - Normal iron panel in 2023  - Check B12, folate    Healthcare maintenance  Continue Aspirin 81 mg daily.   HM labs (CBC, CMP, FLP, A1c, TSH, vitD): 10/2023  HIV/Hep panel/syphilis: HIV/Hep neg in 10/2021, Syph reactive 6/2021 s/p treatment  Pneumococcal vaccine: UTD, PSV23 2021, PSV13 2016  Tdap: UTD, 2022  Zoster vaccine: UTD, 2021  Flu: 10/2023  FIT/colonoscopy: cologuard negative 7/2022  Lung cancer screening (LDCT age 50-80): na  AAA screening (men, age 65-75, smoking history): never smoker      Return to clinic in 3 month(s).      Lennie Casillas MD  [facility], [facility]  5/13/2024       Orders Placed This Encounter    atorvastatin (LIPITOR) 40 MG tablet    metFORMIN (GLUCOPHAGE) 1000 MG tablet

## 2024-06-17 ENCOUNTER — OFFICE VISIT (OUTPATIENT)
Dept: NEPHROLOGY | Facility: CLINIC | Age: 74
End: 2024-06-17
Payer: MEDICARE

## 2024-06-17 VITALS
HEIGHT: 68 IN | TEMPERATURE: 99 F | OXYGEN SATURATION: 99 % | HEART RATE: 54 BPM | RESPIRATION RATE: 20 BRPM | BODY MASS INDEX: 24.26 KG/M2 | SYSTOLIC BLOOD PRESSURE: 128 MMHG | WEIGHT: 160.06 LBS | DIASTOLIC BLOOD PRESSURE: 75 MMHG

## 2024-06-17 DIAGNOSIS — E11.22 TYPE 2 DIABETES MELLITUS WITH STAGE 3A CHRONIC KIDNEY DISEASE, WITHOUT LONG-TERM CURRENT USE OF INSULIN: ICD-10-CM

## 2024-06-17 DIAGNOSIS — N18.31 CKD STAGE G3A/A1, GFR 45-59 AND ALBUMIN CREATININE RATIO <30 MG/G: Primary | ICD-10-CM

## 2024-06-17 DIAGNOSIS — N18.31 TYPE 2 DIABETES MELLITUS WITH STAGE 3A CHRONIC KIDNEY DISEASE, WITHOUT LONG-TERM CURRENT USE OF INSULIN: ICD-10-CM

## 2024-06-17 DIAGNOSIS — I10 PRIMARY HYPERTENSION: ICD-10-CM

## 2024-06-17 PROCEDURE — 99214 OFFICE O/P EST MOD 30 MIN: CPT | Mod: S$PBB,,, | Performed by: NURSE PRACTITIONER

## 2024-06-17 PROCEDURE — 99215 OFFICE O/P EST HI 40 MIN: CPT | Mod: PBBFAC | Performed by: NURSE PRACTITIONER

## 2024-06-17 NOTE — PROGRESS NOTES
"[facility]  Nephrology Clinic Note    Chief complaint: Chronic Kidney Disease (Follow up)    History of present illness:   Duran Espinoza is a 73 y.o. Black or  male with past medical history of  chronic kidney disease, diabetes, hypertension, aortic aneurysm, and cataract.  Patient presents for follow-up appointment in Nephrology Clinic today. Followed by [facility] for urinary incontinence. Denies complaints today.     Review of Systems  12 point review of systems conducted, negative except as stated in the history of present illness.    Allergies: Patient has No Known Allergies.     Past Medical History:  has a past medical history of Aneurysm of ascending aorta, Cataract, CKD, DM, HFpEF, and HTN.    Procedure History:  has a past surgical history that includes stitches (09/14/2022).    Family History: family history includes Diabetes in his brother; Hypertension in his father.    Social History:  reports that he has never smoked. He has never used smokeless tobacco. He reports that he does not currently use alcohol. He reports that he does not use drugs.    Physical exam  /75 (BP Location: Left arm, Patient Position: Sitting, BP Method: Medium (Automatic))   Pulse (!) 54   Temp 98.5 °F (36.9 °C) (Oral)   Resp 20   Ht 5' 8" (1.727 m)   Wt 72.6 kg (160 lb 0.9 oz)   SpO2 99%   BMI 24.34 kg/m²   General appearance: Patient is in no acute distress.  Skin: No rashes or wounds.  HEENT: PERRLA, EOMI, no scleral icterus, no JVD. Neck is supple.  Chest: Respirations are unlabored. Lungs sounds are clear.   Heart: S1, S2.   Abdomen: Benign.  : Deferred.  Extremities: No edema, peripheral pulses are palpable.   Neuro: No focal deficits.     Home Medications:    Current Outpatient Medications:     amLODIPine (NORVASC) 10 MG tablet, Take 1 tablet (10 mg total) by mouth once daily. for 90 days, Disp: 90 tablet, Rfl: 3    aspirin (ECOTRIN) 81 MG EC tablet, Take 1 " tablet (81 mg total) by mouth once daily., Disp: 90 tablet, Rfl: 3    atorvastatin (LIPITOR) 40 MG tablet, Take 1 tablet (40 mg total) by mouth once daily., Disp: 90 tablet, Rfl: 3    finasteride (PROSCAR) 5 mg tablet, Take 1 tablet (5 mg total) by mouth once daily., Disp: 90 tablet, Rfl: 3    lisinopriL (PRINIVIL,ZESTRIL) 20 MG tablet, Take 1 tablet (20 mg total) by mouth once daily. for 90 days, Disp: 90 tablet, Rfl: 3    metFORMIN (GLUCOPHAGE) 1000 MG tablet, Take 1 tablet (1,000 mg total) by mouth 2 (two) times daily., Disp: 180 tablet, Rfl: 3    tamsulosin (FLOMAX) 0.4 mg Cap, Take 2 capsules (0.8 mg total) by mouth once daily., Disp: 180 capsule, Rfl: 3    TRADJENTA 5 mg Tab tablet, Take 1 tablet (5 mg total) by mouth once daily., Disp: 90 tablet, Rfl: 3    blood sugar diagnostic Strp, Use to check blood sugar once a day (Patient not taking: Reported on 5/13/2024), Disp: 100 each, Rfl: 3    blood-glucose meter kit, by Other route. Use as instructed (Patient not taking: Reported on 5/13/2024), Disp: , Rfl:     lancets 32 gauge Misc, 1 lancet  by Misc.(Non-Drug; Combo Route) route 2 times daily 2 hours after meal. (Patient not taking: Reported on 5/13/2024), Disp: 100 each, Rfl: 5    nystatin (MYCOSTATIN) powder, Apply topically 4 (four) times daily. (Patient not taking: Reported on 5/13/2024), Disp: 30 g, Rfl: 0    Laboratory data    Lab Results   Component Value Date    WBC 4.18 (L) 05/13/2024    HGB 13.0 (L) 05/13/2024    HCT 39.4 (L) 05/13/2024     05/13/2024    IRON 80 10/10/2023    TIBC 297 10/10/2023    LABIRON 27 10/10/2023    FERRITIN 73.91 10/10/2023    FOLATE 14.7 02/27/2019    UQESOVGO30 293 09/29/2020     05/13/2024    K 4.3 05/13/2024    CO2 26 05/13/2024    BUN 24.2 05/13/2024    CREATININE 1.37 (H) 05/13/2024    EGFRNORACEVR 54 05/13/2024    GLUCOSE 171 (H) 05/13/2024    CALCIUM 10.0 05/13/2024    ALKPHOS 52 05/13/2024    LABPROT 7.0 05/13/2024    ALBUMIN 3.8 05/13/2024    BILIDIR  0.2 11/05/2021    IBILI 0.20 11/05/2021    AST 21 05/13/2024    ALT 26 05/13/2024    MG 2.1 07/21/2020    PHOS 3.2 10/10/2023      Lab Results   Component Value Date    HGBA1C 6.7 05/13/2024    PTH 61.3 10/10/2023    [id number] 42.2 11/28/2022    HIV Nonreactive 10/04/2021    HEPCAB Nonreactive 10/04/2021     Urine:  Lab Results   Component Value Date    APPEARANCEUA Clear 05/13/2024    SGUA 1.008 05/13/2024    PROTEINUA Negative 05/13/2024    KETONESUA Negative 05/13/2024    LEUKOCYTESUR Negative 05/13/2024    RBCUA None Seen 05/13/2024    WBCUA 0-5 05/13/2024    BACTERIA None Seen 05/13/2024    SQEPUA None Seen 05/13/2024    HYALINECASTS None Seen 05/13/2024    CREATRANDUR 33.3 (L) 05/13/2024    PROTEINURINE <6.8 05/13/2024    UPROTCREA 0.0 06/09/2023         Imaging  Retroperitoneal ultrasound 8/23/2018   The right kidney measures 9.4 x 4.5 x 6.6 cm. There is a cortical cyst  present measuring 3.3 x 2.8 x 3.2 cm. There is no hydronephrosis.     The left kidney measures 12.8 x 5.8 x 6.1 cm. There are multiple cyst  present. The 2 largest measures 4.6 x 4.4 x 4.1 cm and 3.2 x 3.8 x 4.6  cm. There is no hydronephrosis.     IMPRESSION: Bilateral renal cyst.    Impression    ICD-10-CM ICD-9-CM   1. CKD stage G3a/A1, GFR 45-59 and albumin creatinine ratio <30 mg/g  N18.31 585.3   2. Primary hypertension  I10 401.9   3. Type 2 diabetes mellitus with stage 3a chronic kidney disease, without long-term current use of insulin  E11.22 250.40    N18.31 585.3        Plan  CKD stage G3a/A1, GFR 45-59 and albumin creatinine ratio <30 mg/g  -     Comprehensive Metabolic Panel; Future; Expected date: 12/07/2024  -     Protein/Creatinine Ratio, Urine; Future; Expected date: 12/07/2024  -     Urinalysis, Reflex to Urine Culture; Future; Expected date: 12/07/2024  Renal indices are stable , there is no significant proteinuria or active urinary sediment.  Continue current management and periodic monitoring.      Primary  hypertension  Blood pressure reading is at goal, continue current antihypertensive regimen and 2 g a day dietary sodium restriction.      Type 2 diabetes mellitus with stage 3a chronic kidney disease, without long-term current use of insulin  A1c is at goal.  Continue current medication regimen ACE inhibitor therapy.      Follow up in about 6 months (around 12/17/2024).     6/17/2024  Gracie Rodriguez NP  [facility] Nephrology

## 2024-06-25 ENCOUNTER — OFFICE VISIT (OUTPATIENT)
Dept: UROLOGY | Facility: CLINIC | Age: 74
End: 2024-06-25
Payer: MEDICARE

## 2024-06-25 ENCOUNTER — LAB VISIT (OUTPATIENT)
Dept: LAB | Facility: HOSPITAL | Age: 74
End: 2024-06-25
Attending: NURSE PRACTITIONER
Payer: MEDICARE

## 2024-06-25 ENCOUNTER — TELEPHONE (OUTPATIENT)
Dept: UROLOGY | Facility: CLINIC | Age: 74
End: 2024-06-25

## 2024-06-25 VITALS
DIASTOLIC BLOOD PRESSURE: 74 MMHG | HEIGHT: 68 IN | WEIGHT: 156.81 LBS | OXYGEN SATURATION: 98 % | BODY MASS INDEX: 23.77 KG/M2 | HEART RATE: 56 BPM | TEMPERATURE: 99 F | SYSTOLIC BLOOD PRESSURE: 120 MMHG

## 2024-06-25 DIAGNOSIS — N40.1 BENIGN PROSTATIC HYPERPLASIA WITH URINARY FREQUENCY: ICD-10-CM

## 2024-06-25 DIAGNOSIS — N40.0 BENIGN PROSTATIC HYPERPLASIA, UNSPECIFIED WHETHER LOWER URINARY TRACT SYMPTOMS PRESENT: Primary | ICD-10-CM

## 2024-06-25 DIAGNOSIS — N40.0 BENIGN PROSTATIC HYPERPLASIA, UNSPECIFIED WHETHER LOWER URINARY TRACT SYMPTOMS PRESENT: ICD-10-CM

## 2024-06-25 DIAGNOSIS — R35.0 BENIGN PROSTATIC HYPERPLASIA WITH URINARY FREQUENCY: ICD-10-CM

## 2024-06-25 DIAGNOSIS — E11.9 TYPE 2 DIABETES MELLITUS WITHOUT COMPLICATION, WITHOUT LONG-TERM CURRENT USE OF INSULIN: ICD-10-CM

## 2024-06-25 LAB
CREAT UR-MCNC: 185.8 MG/DL (ref 63–166)
POC RESIDUAL URINE VOLUME: 0 ML (ref 0–100)
PROT UR STRIP-MCNC: 7.6 MG/DL
PSA SERPL-MCNC: 2.43 NG/ML
URINE PROTEIN/CREATININE RATIO (OLG): 0

## 2024-06-25 PROCEDURE — 1159F MED LIST DOCD IN RCRD: CPT | Mod: CPTII,,, | Performed by: NURSE PRACTITIONER

## 2024-06-25 PROCEDURE — 84153 ASSAY OF PSA TOTAL: CPT

## 2024-06-25 PROCEDURE — 3072F LOW RISK FOR RETINOPATHY: CPT | Mod: CPTII,,, | Performed by: NURSE PRACTITIONER

## 2024-06-25 PROCEDURE — 1126F AMNT PAIN NOTED NONE PRSNT: CPT | Mod: CPTII,,, | Performed by: NURSE PRACTITIONER

## 2024-06-25 PROCEDURE — 84156 ASSAY OF PROTEIN URINE: CPT

## 2024-06-25 PROCEDURE — 3008F BODY MASS INDEX DOCD: CPT | Mod: CPTII,,, | Performed by: NURSE PRACTITIONER

## 2024-06-25 PROCEDURE — 3044F HG A1C LEVEL LT 7.0%: CPT | Mod: CPTII,,, | Performed by: NURSE PRACTITIONER

## 2024-06-25 PROCEDURE — 36415 COLL VENOUS BLD VENIPUNCTURE: CPT

## 2024-06-25 PROCEDURE — 99213 OFFICE O/P EST LOW 20 MIN: CPT | Mod: S$PBB,,, | Performed by: NURSE PRACTITIONER

## 2024-06-25 PROCEDURE — 99214 OFFICE O/P EST MOD 30 MIN: CPT | Mod: PBBFAC | Performed by: NURSE PRACTITIONER

## 2024-06-25 PROCEDURE — 51798 US URINE CAPACITY MEASURE: CPT | Mod: PBBFAC | Performed by: NURSE PRACTITIONER

## 2024-06-25 PROCEDURE — 1160F RVW MEDS BY RX/DR IN RCRD: CPT | Mod: CPTII,,, | Performed by: NURSE PRACTITIONER

## 2024-06-25 PROCEDURE — 4010F ACE/ARB THERAPY RXD/TAKEN: CPT | Mod: CPTII,,, | Performed by: NURSE PRACTITIONER

## 2024-06-25 PROCEDURE — 3078F DIAST BP <80 MM HG: CPT | Mod: CPTII,,, | Performed by: NURSE PRACTITIONER

## 2024-06-25 PROCEDURE — 3074F SYST BP LT 130 MM HG: CPT | Mod: CPTII,,, | Performed by: NURSE PRACTITIONER

## 2024-06-25 PROCEDURE — 3066F NEPHROPATHY DOC TX: CPT | Mod: CPTII,,, | Performed by: NURSE PRACTITIONER

## 2024-06-25 RX ORDER — FINASTERIDE 5 MG/1
5 TABLET, FILM COATED ORAL DAILY
Qty: 90 TABLET | Refills: 3 | Status: SHIPPED | OUTPATIENT
Start: 2024-06-25 | End: 2025-06-25

## 2024-06-25 RX ORDER — TAMSULOSIN HYDROCHLORIDE 0.4 MG/1
0.8 CAPSULE ORAL DAILY
Qty: 180 CAPSULE | Refills: 3 | Status: SHIPPED | OUTPATIENT
Start: 2024-06-25 | End: 2025-06-25

## 2024-06-25 NOTE — PROGRESS NOTES
Pt in Urology clinic today for f/u.  Bladder scan shows 0 mls retained.  Evaluated per GUSTAVO Stanford NP.  Will draw PSA today and in 6 months.  RTC in 6 months.

## 2024-06-25 NOTE — PROGRESS NOTES
Chief Complaint:   Chief Complaint   Patient presents with    Follow-up     For BPH with urinary frequency       HPI:   Patient is a 73-year-old male here for 3-month follow-up for urinary urgency, urinary frequency, BPH.   Patient on tamsulosin 0.8 mg p.o. daily and Proscar 5 mg p.o. daily which was recently started on last visit.  Today patient presents with much improved symptoms of urinary urgency and frequency he denies any dysuria, urinary incontinence, urinary retention, gross hematuria, nocturia.  Current PSA and previous PSA chart stable much improved.  Bladder scan 0 mL.  Allergies:  Review of patient's allergies indicates:  No Known Allergies    Medications:  Current Outpatient Medications   Medication Sig Dispense Refill    amLODIPine (NORVASC) 10 MG tablet Take 1 tablet (10 mg total) by mouth once daily. for 90 days 90 tablet 3    atorvastatin (LIPITOR) 40 MG tablet Take 1 tablet (40 mg total) by mouth once daily. 90 tablet 3    finasteride (PROSCAR) 5 mg tablet Take 1 tablet (5 mg total) by mouth once daily. 90 tablet 3    lisinopriL (PRINIVIL,ZESTRIL) 20 MG tablet Take 1 tablet (20 mg total) by mouth once daily. for 90 days 90 tablet 3    metFORMIN (GLUCOPHAGE) 1000 MG tablet Take 1 tablet (1,000 mg total) by mouth 2 (two) times daily. 180 tablet 3    tamsulosin (FLOMAX) 0.4 mg Cap Take 2 capsules (0.8 mg total) by mouth once daily. 180 capsule 3    TRADJENTA 5 mg Tab tablet Take 1 tablet (5 mg total) by mouth once daily. 90 tablet 3    aspirin (ECOTRIN) 81 MG EC tablet Take 1 tablet (81 mg total) by mouth once daily. 90 tablet 3    blood sugar diagnostic Strp Use to check blood sugar once a day (Patient not taking: Reported on 5/13/2024) 100 each 3    blood-glucose meter kit by Other route. Use as instructed (Patient not taking: Reported on 5/13/2024)      lancets 32 gauge Misc 1 lancet  by Misc.(Non-Drug; Combo Route) route 2 times daily 2 hours after meal. (Patient not taking: Reported on  5/13/2024) 100 each 5    nystatin (MYCOSTATIN) powder Apply topically 4 (four) times daily. (Patient not taking: Reported on 5/13/2024) 30 g 0     No current facility-administered medications for this visit.       Review of Systems:  General: No fever, chills, fatigability, or weight loss.  Skin: No rashes, itching, or changes in color or texture of skin.  Chest: Denies HYMAN, cyanosis, wheezing, cough, and sputum production.  Abdomen: Appetite fine. No weight loss. Denies diarrhea, abdominal pain, hematemesis, or blood in stool.  Musculoskeletal: No joint stiffness or swelling. Denies back pain.  : As above.  All other review of systems negative.    PMH:  Past Medical History:   Diagnosis Date    Aneurysm of ascending aorta     Cataract     CKD     DM     HFpEF     HTN        PSH:  Past Surgical History:   Procedure Laterality Date    stitches  09/14/2022    left fore finger       FamHx:  Family History   Problem Relation Name Age of Onset    Hypertension Father      Diabetes Brother         SocHx:  Social History     Socioeconomic History    Marital status:     Number of children: 2   Tobacco Use    Smoking status: Never    Smokeless tobacco: Never   Substance and Sexual Activity    Alcohol use: Not Currently    Drug use: Never    Sexual activity: Not Currently     Social Determinants of Health     Financial Resource Strain: Low Risk  (5/13/2024)    Overall Financial Resource Strain (CARDIA)     Difficulty of Paying Living Expenses: Not hard at all   Food Insecurity: No Food Insecurity (5/13/2024)    Hunger Vital Sign     Worried About Running Out of Food in the Last Year: Never true     Ran Out of Food in the Last Year: Never true   Transportation Needs: No Transportation Needs (5/13/2024)    TRANSPORTATION NEEDS     Transportation : No   Physical Activity: Sufficiently Active (5/13/2024)    Exercise Vital Sign     Days of Exercise per Week: 5 days     Minutes of Exercise per Session: 60 min   Stress: No  Stress Concern Present (5/13/2024)    [employer] - Occupational Stress Questionnaire     Feeling of Stress : Not at all   Housing Stability: Low Risk  (5/13/2024)    Housing Stability Vital Sign     Unable to Pay for Housing in the Last Year: No     Homeless in the Last Year: No       Physical Exam:  Vitals:    06/25/24 0758   BP: 120/74   Pulse: (!) 56   Temp: 98.5 °F (36.9 °C)     General: A&Ox3, no apparent distress, no deformities  Neck: No masses, normal thyroid  Lungs: CTA angela, no use of accessory muscles  Heart: RRR, no arrhythmias  Abdomen: Soft, NT, ND, no masses, no hernias, no hepatosplenomegaly  Lymphatic: Neck and groin nodes negative  Skin: The skin is warm and dry. No jaundice.  Ext: No c/c/e.    [place]:  Patient deferred digital rectal exam, his last digital rectal exam was on 02/15/2024.        Labs:    Latest Reference Range & Units 02/02/23 10:08 08/10/23 09:50 02/15/24 08:41   Prostate Specific Antigen <=4.00 ng/mL 2.44 1.94 1.69         Impression:  1. Benign prostatic hyperplasia, unspecified whether lower urinary tract symptoms present  - POCT Bladder Scan      Plan:  Instructed patient continue tamsulosin 0.8 mg p.o. daily and Proscar 5 mg p.o. daily.  Instructed patient on timed voiding every 2-3 hrs, double voiding, avoidance of bladder irritants such as alcohol, citrus foods, chocolate, caffeinated drinks, energy drinks, spicy foods, sugar, caffeine products, sodas. Instructed patient to avoid drinking fluids 1-2 hours prior to bedtime & void immediately before bedtime.   Instructed patient PSA now and RTC 6 months with PSA.  Instructed patient develops any abnormal urologic symptoms notify clinic to be re-evaluate treated or go to emergency room during after hours.

## 2024-09-16 ENCOUNTER — OFFICE VISIT (OUTPATIENT)
Dept: OPHTHALMOLOGY | Facility: CLINIC | Age: 74
End: 2024-09-16
Payer: MEDICARE

## 2024-09-16 VITALS — WEIGHT: 156.75 LBS | HEIGHT: 68 IN | BODY MASS INDEX: 23.76 KG/M2

## 2024-09-16 DIAGNOSIS — E11.9 DIABETES MELLITUS WITHOUT OPHTHALMIC MANIFESTATIONS: ICD-10-CM

## 2024-09-16 DIAGNOSIS — B00.52 HERPES SIMPLEX VIRUS (HSV) STROMAL KERATITIS OF LEFT EYE: ICD-10-CM

## 2024-09-16 DIAGNOSIS — H25.813 COMBINED FORM OF AGE-RELATED CATARACT, BOTH EYES: Primary | ICD-10-CM

## 2024-09-16 PROCEDURE — 99213 OFFICE O/P EST LOW 20 MIN: CPT | Mod: PBBFAC,PN,25

## 2024-09-16 PROCEDURE — 92250 FUNDUS PHOTOGRAPHY W/I&R: CPT | Mod: PBBFAC,PN | Performed by: OPHTHALMOLOGY

## 2024-09-16 PROCEDURE — 92250 FUNDUS PHOTOGRAPHY W/I&R: CPT | Mod: PBBFAC,PN

## 2024-09-16 NOTE — PROGRESS NOTES
HPI    RTC 1yr for Mrx/BAT, annual DFE,or sooner PRN  Last edited by Lilia Hendrix MA on 9/16/2024  9:37 AM.            Assessment /Plan     For exam results, see Encounter Report.    There are no diagnoses linked to this encounter.      Assessment/Plan    1. Hx of Viral Keratitis, left eye   - Presented with corneal edema and D folds. 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  Pts PCP notified.   - Started on acyclovir 400mg PO 5x daily   - Interval exam 3/4/22: VA improved to 20/25. No recurrent corneal edema. AC quiet. IOP wnl. Patient remains asymptomatic.   - 5/6/22: resolved   - 9/16/24: Off acyclovir and pred forte, no signs of recurrence   - PFATs 4-6x daily   - Counseled patient on return precautions     2. H/o Corneal Ulcer with metallic FB OS     - Stable, 2x2 mm stromal scar with minimal corneal thinning without epi defect or infiltrate     - Stressed using eye protection       3. Combined form of age-related cataract, both eyes   - VS OD, borderline OS with significant BAT. Patient does not drive at night too much, and is not bothered at this time. Defers surgery at this time, but will think about it. Instructed patient to call back if he changes his mind. Patient will discuss with his PCP next visit to get clearance.   - Note, will likely need course of acyclovir pre-operatively for OS to prevent HSV re-activation as above       4. DM without retinopathy    - Well controlled, on metformin, stressed BS/BP control    - A1c 6.7%, no retinopathy on examination.     - CTM w/ annual DFE (due 9/2025)    - monitor    5. PVD, OS  - No sx  - RD precautions discussed   - CTM      RTC

## 2024-09-30 ENCOUNTER — LAB VISIT (OUTPATIENT)
Dept: LAB | Facility: HOSPITAL | Age: 74
End: 2024-09-30
Attending: STUDENT IN AN ORGANIZED HEALTH CARE EDUCATION/TRAINING PROGRAM
Payer: MEDICARE

## 2024-09-30 DIAGNOSIS — E11.9 TYPE 2 DIABETES MELLITUS WITHOUT COMPLICATION, WITHOUT LONG-TERM CURRENT USE OF INSULIN: ICD-10-CM

## 2024-09-30 DIAGNOSIS — N40.0 BENIGN PROSTATIC HYPERPLASIA, UNSPECIFIED WHETHER LOWER URINARY TRACT SYMPTOMS PRESENT: ICD-10-CM

## 2024-09-30 DIAGNOSIS — D53.9 MACROCYTIC ANEMIA: ICD-10-CM

## 2024-09-30 LAB
EST. AVERAGE GLUCOSE BLD GHB EST-MCNC: 157.1 MG/DL
FOLATE SERPL-MCNC: 10.9 NG/ML (ref 7–31.4)
HBA1C MFR BLD: 7.1 %
PSA SERPL-MCNC: 1.42 NG/ML
VIT B12 SERPL-MCNC: 394 PG/ML (ref 213–816)

## 2024-09-30 PROCEDURE — 84153 ASSAY OF PSA TOTAL: CPT

## 2024-09-30 PROCEDURE — 83036 HEMOGLOBIN GLYCOSYLATED A1C: CPT

## 2024-09-30 PROCEDURE — 36415 COLL VENOUS BLD VENIPUNCTURE: CPT

## 2024-09-30 PROCEDURE — 82746 ASSAY OF FOLIC ACID SERUM: CPT

## 2024-09-30 PROCEDURE — 82607 VITAMIN B-12: CPT

## 2024-10-02 ENCOUNTER — OFFICE VISIT (OUTPATIENT)
Dept: INTERNAL MEDICINE | Facility: CLINIC | Age: 74
End: 2024-10-02
Payer: MEDICARE

## 2024-10-02 VITALS
TEMPERATURE: 98 F | HEART RATE: 55 BPM | DIASTOLIC BLOOD PRESSURE: 64 MMHG | SYSTOLIC BLOOD PRESSURE: 130 MMHG | OXYGEN SATURATION: 99 % | WEIGHT: 158 LBS | BODY MASS INDEX: 24.02 KG/M2 | RESPIRATION RATE: 20 BRPM

## 2024-10-02 DIAGNOSIS — I10 PRIMARY HYPERTENSION: ICD-10-CM

## 2024-10-02 DIAGNOSIS — R35.0 BENIGN PROSTATIC HYPERPLASIA WITH URINARY FREQUENCY: ICD-10-CM

## 2024-10-02 DIAGNOSIS — E11.22 TYPE 2 DIABETES MELLITUS WITH CHRONIC KIDNEY DISEASE, WITHOUT LONG-TERM CURRENT USE OF INSULIN, UNSPECIFIED CKD STAGE: Primary | ICD-10-CM

## 2024-10-02 DIAGNOSIS — Z23 NEED FOR VACCINATION: ICD-10-CM

## 2024-10-02 DIAGNOSIS — N40.1 BENIGN PROSTATIC HYPERPLASIA WITH URINARY FREQUENCY: ICD-10-CM

## 2024-10-02 DIAGNOSIS — N18.9 CHRONIC KIDNEY DISEASE, UNSPECIFIED CKD STAGE: ICD-10-CM

## 2024-10-02 DIAGNOSIS — I10 HYPERTENSION, UNSPECIFIED TYPE: ICD-10-CM

## 2024-10-02 PROCEDURE — 90653 IIV ADJUVANT VACCINE IM: CPT | Mod: PBBFAC

## 2024-10-02 PROCEDURE — G0008 ADMIN INFLUENZA VIRUS VAC: HCPCS | Mod: PBBFAC

## 2024-10-02 PROCEDURE — 99214 OFFICE O/P EST MOD 30 MIN: CPT | Mod: PBBFAC | Performed by: STUDENT IN AN ORGANIZED HEALTH CARE EDUCATION/TRAINING PROGRAM

## 2024-10-02 RX ORDER — LISINOPRIL 20 MG/1
20 TABLET ORAL DAILY
Qty: 90 TABLET | Refills: 3 | Status: SHIPPED | OUTPATIENT
Start: 2024-10-02 | End: 2025-10-02

## 2024-10-02 RX ADMIN — INFLUENZA A VIRUS A/VICTORIA/4897/2022 IVR-238 (H1N1) ANTIGEN (FORMALDEHYDE INACTIVATED), INFLUENZA A VIRUS A/THAILAND/8/2022 IVR-237 (H3N2) ANTIGEN (FORMALDEHYDE INACTIVATED), INFLUENZA B VIRUS B/AUSTRIA/1359417/2021 BVR-26 ANTIGEN (FORMALDEHYDE INACTIVATED) 0.5 ML: 15; 15; 15 INJECTION, SUSPENSION INTRAMUSCULAR at 08:10

## 2024-10-02 NOTE — PROGRESS NOTES
I saw and evaluated the patient and was present for the key portions of the exam and separately billed procedures, if any.  I have reviewed and agree with the resident's findings, including all diagnostic interpretations and plans as written.    T2DM w CKD: recent A1c 7.1%. Stop Tradjenta. Start Jardiance if able to obtain with insurance. Follows with nephrology  Ascending aortic aneurysm: BP at goal <130/80. Following with Cardiology [facility]. Rest per resident note    Beverley Heredia MD

## 2024-10-02 NOTE — PROGRESS NOTES
[facility]  OUTPATIENT OFFICE VISIT NOTE    SUBJECTIVE:      Chief Complaint: Follow-up (Lab Results. Wants flu vaccine)       HPI: Duran Espinoza is a 73 y.o. yo male w/ PMH of  has a past medical history of Aneurysm of ascending aorta, Cataract, CKD, DM, HFpEF, and HTN., who presents for follow up.      Today, patient reports difficulty seeing at night related to cataracts. States he is considering cataract surgery at this time.       Past Medical History:   has a past medical history of Aneurysm of ascending aorta, Cataract, CKD, DM, HFpEF, and HTN.     Past Surgical History:   has a past surgical history that includes stitches (09/14/2022).     Family History:  family history includes Diabetes in his brother; Hypertension in his father.     Social History:   reports that he has never smoked. He has never used smokeless tobacco. He reports that he does not currently use alcohol. He reports that he does not use drugs.     Allergies:  has No Known Allergies.     Home Medications:  Current Outpatient Medications   Medication Sig    amLODIPine (NORVASC) 10 MG tablet Take 1 tablet (10 mg total) by mouth once daily. for 90 days    aspirin (ECOTRIN) 81 MG EC tablet Take 1 tablet (81 mg total) by mouth once daily.    atorvastatin (LIPITOR) 40 MG tablet Take 1 tablet (40 mg total) by mouth once daily.    finasteride (PROSCAR) 5 mg tablet Take 1 tablet (5 mg total) by mouth once daily.    metFORMIN (GLUCOPHAGE) 1000 MG tablet Take 1 tablet (1,000 mg total) by mouth 2 (two) times daily.    tamsulosin (FLOMAX) 0.4 mg Cap Take 2 capsules (0.8 mg total) by mouth once daily.    TRADJENTA 5 mg Tab tablet Take 1 tablet (5 mg total) by mouth once daily.    blood sugar diagnostic Strp Use to check blood sugar once a day (Patient not taking: Reported on 10/2/2024)    blood-glucose meter kit by Other route. Use as instructed (Patient not taking: Reported on 10/2/2024)    lancets 32 gauge Misc 1 lancet  by  Misc.(Non-Drug; Combo Route) route 2 times daily 2 hours after meal. (Patient not taking: Reported on 10/2/2024)    lisinopriL (PRINIVIL,ZESTRIL) 20 MG tablet Take 1 tablet (20 mg total) by mouth once daily. for 90 days    nystatin (MYCOSTATIN) powder Apply topically 4 (four) times daily. (Patient not taking: Reported on 10/2/2024)     Current Facility-Administered Medications   Medication    influenza (adjuvanted) (Fluad) 45 mcg/0.5 mL IM vaccine (> or = 66 yo) 0.5 mL         ROS:  Negative unless otherwise noted above.       OBJECTIVE:     Vital signs:   /64 (BP Location: Left arm, Patient Position: Sitting)   Pulse (!) 55   Temp 98.2 °F (36.8 °C) (Oral)   Resp 20   Wt 71.7 kg (158 lb)   SpO2 99%   BMI 24.02 kg/m²      Physical Examination:  General appearance: alert, cooperative and no distress  Head: Normocephalic, without obvious abnormality, atraumatic  Lungs: clear to auscultation bilaterally  Heart: bradycardic, regular rate, S1, S2 normal, systolic murmur, no click, rub or gallop  Abdomen: soft, non-tender; bowel sounds normal; no masses,  no organomegaly  Extremities: extremities normal, atraumatic, no cyanosis or edema  Skin: Skin color, texture, turgor normal. No rashes or lesions  Neurologic: Grossly normal      Labs:  CMP:   Lab Results   Component Value Date    GLUCOSE 171 (H) 05/13/2024    CALCIUM 10.0 05/13/2024    ALBUMIN 3.8 05/13/2024     05/13/2024    K 4.3 05/13/2024    CO2 26 05/13/2024     (H) 05/13/2024    BUN 24.2 05/13/2024    CREATININE 1.37 (H) 05/13/2024    ALKPHOS 52 05/13/2024    ALT 26 05/13/2024    AST 21 05/13/2024    BILITOT 0.4 05/13/2024      CBC:   Lab Results   Component Value Date    WBC 4.18 (L) 05/13/2024    HGB 13.0 (L) 05/13/2024    HCT 39.4 (L) 05/13/2024    MCV 94.7 (H) 05/13/2024    RDW 13.4 05/13/2024     FLP:   Lab Results   Component Value Date    CHOL 151 05/13/2024    HDL 67 (H) 05/13/2024    LDL 80.00 05/13/2024    TRIG 18 (L) 05/13/2024     TOTALCHOLEST 2 05/13/2024     DM:   Lab Results   Component Value Date    HGBA1C 7.1 (H) 09/30/2024    .1 09/30/2024    CREATININE 1.37 (H) 05/13/2024    CREATRANDUR 185.8 (H) 06/25/2024    PROTEINURINE 7.6 06/25/2024     Anemia:   Lab Results   Component Value Date    IRON 80 10/10/2023    TIBC 297 10/10/2023    FERRITIN 73.91 10/10/2023    [id number] 394 09/30/2024    FOLATE 10.9 09/30/2024         ASSESSMENT & PLAN:     Aneurysm in ascending aorta- Stable  Possibly tertiary syphilis manifestation  4.6 cm on CT thorax in 9/2018  4.7 cm on CT thorax in 10/2019  echocardiogram 9/2018-EF 60%  advised on good control of BP (systolic goal 110-120)  following a Cardiologist in [place]. Last visit on 11/2021.  [place] [facility] [place] states aneurysm is stable per routine imaging with no indication for surgery.   Seen by Kurtis Pollock in 4/2023: TTE with moderate dilated ascending aorta, stable with no plans for surgical intervention at this time  Goal SBP <130    Tertiary syphilis- treated  Syphilis positive on 2/2021- dils 2, repeat on 6/2021 with dils 0  h/o Aortic aneurysm, possibly tertiary involvement  s/p 3x Bicillin injections completed    HTN  Sinus bradycardia  130/64, HR 55 today  cont Norvasc to 10 mg daily, lisinopril 20 mg daily  advised to keep a BP log, and to follow low salt diet    DM  Well controlled  A1c 7.1 in 9/2024, 6.7 in 5/2024, POC A1c 7.6 in 12/2023  Discussed lifestyle modifications, referral to diabetic education  Should A1c remian elevated at f/u, can consider 2nd diabetic agent  urine microalbumin/creatinine wnl 8/2023. Currently on ACEI  Continue on metformin to 1000 mg bid  Start Jardiance 10 mg daily, Stop tradjenta  Diabetic eye exam- follows ophtho, last seen 2/9/2023, no retinopathy, DM eye exam ordered 5/13/2024  Diabetic foot exam done 12/2023  UPC 0.0 on 6/25/2024, Urine microalb:creatinine ordered 10/2024  advised on maintaining a cbg log an  following diabetic diet and daily exercise    CKD stage 2  At baseline poor PO water intake, has been encouraged to drink more every clinic visit  BUN 24.2, Cr 1.37, GFR 54 in 5/2024  BUN 19.9, Cr 1.28, GFR 59 in 10/2023  avoid nephrotoxic meds  Following with nephrology, appreciate their assistance    HFpEF  Echo 9/2018-EF 60%  FLP 5/2024: , HDL 67, TG 18, LDL 80 (prev LDL 76 in 11/2022)  cont aspirin, acei, statin  holding bb due to hx of bradycardia    Chronic mild leukopenia, 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 ethnic leukopenia   Negative HIV, hep panel    BPH with urinary incontinency  PSA wnl 2/2023  Following with urology, appreciate their assistance  Continue tamsulosin 0.4 mg daily    Onicomycosis/toe fungus - resolved  - provided nystatin powder for feet bilaterally in 12/2023    Macrocytic anemia  - Hgb 13, Hct 39.4, MCV 94.7 in 5/2024  - Negative cologuard 2022, Colonoscopy with diverticulosis in 2/2024  - Normal iron panel in 2023  - Folate, B12 wnl in 9/2024    Healthcare maintenance   labs (CBC, CMP, FLP, A1c, TSH, vitD): 10/2023  HIV/Hep panel/syphilis: HIV/Hep neg in 10/2021, Syph reactive 6/2021 s/p treatment  Pneumococcal vaccine: UTD, PSV23 2021, PSV13 2016  Tdap: UTD, 2022  Zoster vaccine: UTD, 2021  Flu: 10/2024  FIT/colonoscopy: cologuard negative 7/2022  Lung cancer screening (LDCT age 50-80): na  AAA screening (men, age 65-75, smoking history): never smoker      Return to clinic in 3 month(s).      Lennie Casillas MD  [facility], [facility]  10/2/2024       Orders Placed This Encounter    Microalbumin/creatinine urine ratio    influenza (adjuvanted) (Fluad) 45 mcg/0.5 mL IM vaccine (> or = 64 yo) 0.5 mL    lisinopriL (PRINIVIL,ZESTRIL) 20 MG tablet

## 2024-12-05 ENCOUNTER — OFFICE VISIT (OUTPATIENT)
Dept: NEPHROLOGY | Facility: CLINIC | Age: 74
End: 2024-12-05
Payer: MEDICARE

## 2024-12-05 VITALS
BODY MASS INDEX: 24.31 KG/M2 | HEART RATE: 57 BPM | WEIGHT: 160.38 LBS | HEIGHT: 68 IN | RESPIRATION RATE: 20 BRPM | OXYGEN SATURATION: 97 % | SYSTOLIC BLOOD PRESSURE: 126 MMHG | DIASTOLIC BLOOD PRESSURE: 76 MMHG | TEMPERATURE: 99 F

## 2024-12-05 DIAGNOSIS — E11.22 TYPE 2 DIABETES MELLITUS WITH STAGE 3A CHRONIC KIDNEY DISEASE, WITHOUT LONG-TERM CURRENT USE OF INSULIN: ICD-10-CM

## 2024-12-05 DIAGNOSIS — N18.31 TYPE 2 DIABETES MELLITUS WITH STAGE 3A CHRONIC KIDNEY DISEASE, WITHOUT LONG-TERM CURRENT USE OF INSULIN: ICD-10-CM

## 2024-12-05 DIAGNOSIS — D63.1 ANEMIA, CHRONIC RENAL FAILURE, STAGE 3 (MODERATE): ICD-10-CM

## 2024-12-05 DIAGNOSIS — N18.31 CKD STAGE G3A/A1, GFR 45-59 AND ALBUMIN CREATININE RATIO <30 MG/G: Primary | ICD-10-CM

## 2024-12-05 DIAGNOSIS — I10 PRIMARY HYPERTENSION: ICD-10-CM

## 2024-12-05 DIAGNOSIS — E83.52 HYPERCALCEMIA: ICD-10-CM

## 2024-12-05 DIAGNOSIS — N18.30 ANEMIA, CHRONIC RENAL FAILURE, STAGE 3 (MODERATE): ICD-10-CM

## 2024-12-05 PROCEDURE — 99214 OFFICE O/P EST MOD 30 MIN: CPT | Mod: PBBFAC | Performed by: NURSE PRACTITIONER

## 2024-12-05 NOTE — PROGRESS NOTES
"[facility]  Nephrology Clinic Note    Chief complaint: Chronic Kidney Disease (Follow up)    History of present illness:   Duran Espinoza is a 74 y.o. Black or  male with past medical history of  chronic kidney disease, diabetes, hypertension, aortic aneurysm, and cataract.  Followed by [facility] for urinary incontinence. Denies complaints today, presents for follow up.     Review of Systems  12 point review of systems conducted, negative except as stated in the history of present illness.    Allergies: Patient has No Known Allergies.     Past Medical History:  has a past medical history of Aneurysm of ascending aorta, Cataract, CKD, DM, HFpEF, and HTN.    Procedure History:  has a past surgical history that includes stitches (09/14/2022).    Family History: family history includes Diabetes in his brother; Hypertension in his father.    Social History:  reports that he has never smoked. He has never used smokeless tobacco. He reports that he does not currently use alcohol. He reports that he does not use drugs.    Physical exam  /76 (BP Location: Right arm, Patient Position: Sitting)   Pulse (!) 57   Temp 98.7 °F (37.1 °C) (Oral)   Resp 20   Ht 5' 8" (1.727 m)   Wt 72.8 kg (160 lb 6.4 oz)   SpO2 97%   BMI 24.39 kg/m²   General appearance: Patient is in no acute distress.  Skin: No rashes or wounds.  HEENT: PERRLA, EOMI, no scleral icterus, no JVD. Neck is supple.  Chest: Respirations are unlabored. Lungs sounds are clear.   Heart: S1, S2.   Abdomen: Benign.  : Deferred.  Extremities: No edema, peripheral pulses are palpable.   Neuro: No focal deficits.     Home Medications:    Current Outpatient Medications:     amLODIPine (NORVASC) 10 MG tablet, Take 1 tablet (10 mg total) by mouth once daily. for 90 days, Disp: 90 tablet, Rfl: 3    aspirin (ECOTRIN) 81 MG EC tablet, Take 1 tablet (81 mg total) by mouth once daily., Disp: 90 tablet, Rfl: 3    " atorvastatin (LIPITOR) 40 MG tablet, Take 1 tablet (40 mg total) by mouth once daily., Disp: 90 tablet, Rfl: 3    blood sugar diagnostic Strp, Use to check blood sugar once a day, Disp: 100 each, Rfl: 3    blood-glucose meter kit, by Other route. Use as instructed, Disp: , Rfl:     empagliflozin (JARDIANCE) 10 mg tablet, Take 1 tablet (10 mg total) by mouth once daily., Disp: 90 tablet, Rfl: 3    finasteride (PROSCAR) 5 mg tablet, Take 1 tablet (5 mg total) by mouth once daily., Disp: 90 tablet, Rfl: 3    lancets 32 gauge Misc, 1 lancet  by Misc.(Non-Drug; Combo Route) route 2 times daily 2 hours after meal., Disp: 100 each, Rfl: 5    lisinopriL (PRINIVIL,ZESTRIL) 20 MG tablet, Take 1 tablet (20 mg total) by mouth once daily. for 90 days, Disp: 90 tablet, Rfl: 3    metFORMIN (GLUCOPHAGE) 1000 MG tablet, Take 1 tablet (1,000 mg total) by mouth 2 (two) times daily., Disp: 180 tablet, Rfl: 3    nystatin (MYCOSTATIN) powder, Apply topically 4 (four) times daily., Disp: 30 g, Rfl: 0    tamsulosin (FLOMAX) 0.4 mg Cap, Take 2 capsules (0.8 mg total) by mouth once daily., Disp: 180 capsule, Rfl: 3    Laboratory data    Lab Results   Component Value Date    WBC 4.18 (L) 05/13/2024    HGB 13.0 (L) 05/13/2024    HCT 39.4 (L) 05/13/2024     05/13/2024    IRON 80 10/10/2023    TIBC 297 10/10/2023    LABIRON 27 10/10/2023    FERRITIN 73.91 10/10/2023    FOLATE 10.9 09/30/2024    [id number] 394 09/30/2024     12/05/2024    K 4.3 12/05/2024    CO2 30 12/05/2024    BUN 17.7 12/05/2024    CREATININE 1.29 (H) 12/05/2024    EGFRNORACEVR 58 12/05/2024    GLUCOSE 152 (H) 12/05/2024    CALCIUM 10.5 (H) 12/05/2024    ALKPHOS 54 12/05/2024    LABPROT 6.7 12/05/2024    ALBUMIN 3.7 12/05/2024    BILIDIR 0.2 11/05/2021    IBILI 0.20 11/05/2021    AST 16 12/05/2024    ALT 16 12/05/2024    MG 2.1 07/21/2020    PHOS 3.2 10/10/2023      Lab Results   Component Value Date    HGBA1C 7.1 (H) 09/30/2024    PTH 61.3 10/10/2023     YRRPOUHA08CH 42.2 11/28/2022    HIV Nonreactive 10/04/2021    HEPCAB Nonreactive 10/04/2021     Urine:  Lab Results   Component Value Date    APPEARANCEUA Clear 12/05/2024    SGUA 1.027 12/05/2024    PROTEINUA Negative 12/05/2024    KETONESUA Negative 12/05/2024    LEUKOCYTESUR Negative 12/05/2024    RBCUA 0-5 12/05/2024    WBCUA 0-5 12/05/2024    BACTERIA None Seen 12/05/2024    SQEPUA Trace (A) 12/05/2024    HYALINECASTS None Seen 12/05/2024    CREATRANDUR 106.4 12/05/2024    CREATRANDUR 106.7 12/05/2024    PROTEINURINE <6.8 12/05/2024    UPROTCREA 0.0 06/25/2024         Imaging  Retroperitoneal ultrasound 8/23/2018   The right kidney measures 9.4 x 4.5 x 6.6 cm. There is a cortical cyst  present measuring 3.3 x 2.8 x 3.2 cm. There is no hydronephrosis.  The left kidney measures 12.8 x 5.8 x 6.1 cm. There are multiple cyst  present. The 2 largest measures 4.6 x 4.4 x 4.1 cm and 3.2 x 3.8 x 4.6  cm. There is no hydronephrosis.  IMPRESSION: Bilateral renal cyst.    Impression    ICD-10-CM ICD-9-CM   1. CKD stage G3a/A1, GFR 45-59 and albumin creatinine ratio <30 mg/g  N18.31 585.3   2. Primary hypertension  I10 401.9   3. Type 2 diabetes mellitus with stage 3a chronic kidney disease, without long-term current use of insulin  E11.22 250.40    N18.31 585.3   4. Anemia, chronic renal failure, stage 3 (moderate)  N18.30 285.21    D63.1 585.3   5. Hypercalcemia  E83.52 275.42   6. BMI 23.0-23.9, adult  Z68.23 V85.1        Plan  CKD stage G3a/A1, GFR 45-59 and albumin creatinine ratio <30 mg/g  Renal indices are stable , there is no significant proteinuria or active urinary sediment.  Most likely etiology of CKD is diabetes and/or hypertension. Continue ACE inhibitor, SGLT2 inhibitor, renal sparing activities and periodic monitoring.      Primary hypertension  Blood pressure reading is at goal, continue current antihypertensive regimen and 2 g a day dietary sodium restriction.      Type 2 diabetes mellitus with stage 3a  chronic kidney disease, without long-term current use of insulin  A1C is at goal, continue ACE inhibitor and SGLT2 inhibitor therapy.    Anemia, chronic renal failure, stage 3 (moderate)  There are no indications for erythropoietin stimulating agent therapy at present.  Will continue to monitor hemoglobin and hematocrit levels periodically.    Hypercalcemia  Will check intact PTH, phosphorus, and vitamin-D level prior to next visit.    BMI 23.0-23.9, adult  Lifestyle and dietary interventions discussed, patient encouraged to maintain non-sedentary lifestyle and well-balanced diet.        Follow up in about 6 months (around 6/5/2025).     Orders Placed This Encounter   Procedures    Comprehensive Metabolic Panel     Standing Status:   Future     Standing Expiration Date:   6/25/2025    Urinalysis, Reflex to Urine Culture     Standing Status:   Future     Standing Expiration Date:   6/25/2025     Order Specific Question:   Specimen Source     Answer:   Urine    Vitamin D     Standing Status:   Future     Standing Expiration Date:   6/25/2025    Phosphorus     Standing Status:   Future     Standing Expiration Date:   6/25/2025    Protein/Creatinine Ratio, Urine     Standing Status:   Future     Standing Expiration Date:   6/25/2025     Order Specific Question:   Specimen Source     Answer:   Urine    PTH, Intact     Standing Status:   Future     Standing Expiration Date:   6/25/2025    CBC Auto Differential     Standing Status:   Future     Standing Expiration Date:   6/25/2025        Gracie Rodriguez NP  [facility]

## 2024-12-24 ENCOUNTER — LAB VISIT (OUTPATIENT)
Dept: LAB | Facility: HOSPITAL | Age: 74
End: 2024-12-24
Attending: NURSE PRACTITIONER
Payer: MEDICARE

## 2024-12-24 DIAGNOSIS — N40.0 BENIGN PROSTATIC HYPERPLASIA, UNSPECIFIED WHETHER LOWER URINARY TRACT SYMPTOMS PRESENT: ICD-10-CM

## 2024-12-24 DIAGNOSIS — N40.0 BENIGN PROSTATIC HYPERPLASIA, UNSPECIFIED WHETHER LOWER URINARY TRACT SYMPTOMS PRESENT: Primary | ICD-10-CM

## 2024-12-24 LAB — PSA SERPL-MCNC: 1.1 NG/ML

## 2024-12-24 PROCEDURE — 36415 COLL VENOUS BLD VENIPUNCTURE: CPT

## 2024-12-24 PROCEDURE — 84153 ASSAY OF PSA TOTAL: CPT

## 2024-12-27 ENCOUNTER — OFFICE VISIT (OUTPATIENT)
Dept: UROLOGY | Facility: CLINIC | Age: 74
End: 2024-12-27
Payer: MEDICARE

## 2024-12-27 VITALS
HEART RATE: 51 BPM | SYSTOLIC BLOOD PRESSURE: 155 MMHG | BODY MASS INDEX: 24.25 KG/M2 | HEIGHT: 68 IN | OXYGEN SATURATION: 98 % | RESPIRATION RATE: 19 BRPM | WEIGHT: 160 LBS | TEMPERATURE: 98 F | DIASTOLIC BLOOD PRESSURE: 71 MMHG

## 2024-12-27 DIAGNOSIS — N40.1 BENIGN PROSTATIC HYPERPLASIA WITH URINARY FREQUENCY: ICD-10-CM

## 2024-12-27 DIAGNOSIS — R35.0 BENIGN PROSTATIC HYPERPLASIA WITH URINARY FREQUENCY: ICD-10-CM

## 2024-12-27 PROCEDURE — 99214 OFFICE O/P EST MOD 30 MIN: CPT | Mod: PBBFAC | Performed by: NURSE PRACTITIONER

## 2024-12-27 RX ORDER — FINASTERIDE 5 MG/1
5 TABLET, FILM COATED ORAL DAILY
Qty: 90 TABLET | Refills: 3 | Status: SHIPPED | OUTPATIENT
Start: 2024-12-27 | End: 2025-12-27

## 2024-12-27 RX ORDER — TAMSULOSIN HYDROCHLORIDE 0.4 MG/1
0.8 CAPSULE ORAL DAILY
Qty: 180 CAPSULE | Refills: 3 | Status: SHIPPED | OUTPATIENT
Start: 2024-12-27 | End: 2024-12-27

## 2024-12-27 RX ORDER — ALFUZOSIN HYDROCHLORIDE 10 MG/1
10 TABLET, EXTENDED RELEASE ORAL
Qty: 30 TABLET | Refills: 11 | Status: SHIPPED | OUTPATIENT
Start: 2024-12-27 | End: 2025-12-27

## 2024-12-27 NOTE — PROGRESS NOTES
Chief Complaint:   Chief Complaint   Patient presents with    Follow-up       HPI:   Patient is a 74-year-old male here for 6-month follow-up for urinary urgency, urinary frequency, BPH.   Patient on tamsulosin 0.8 mg p.o. daily and Proscar 5 mg p.o. daily.  Today patient presents with much improved symptoms of urinary urgency and frequency he denies any dysuria, urinary incontinence, urinary retention, gross hematuria, nocturia, patient actually does have decreased urine stream therefore we will discontinue tamsulosin and start alfuzosin and RTC virtual visit in one-month..  Current PSA and previous PSA chart stable much improved.  PSA 1.01 due to patient on Proscar actual PSA 2.20.      Allergies:  Review of patient's allergies indicates:  No Known Allergies    Medications:  Current Outpatient Medications   Medication Sig Dispense Refill    amLODIPine (NORVASC) 10 MG tablet Take 1 tablet (10 mg total) by mouth once daily. for 90 days 90 tablet 3    atorvastatin (LIPITOR) 40 MG tablet Take 1 tablet (40 mg total) by mouth once daily. 90 tablet 3    blood sugar diagnostic Strp Use to check blood sugar once a day 100 each 3    blood-glucose meter kit by Other route. Use as instructed      empagliflozin (JARDIANCE) 10 mg tablet Take 1 tablet (10 mg total) by mouth once daily. 90 tablet 3    finasteride (PROSCAR) 5 mg tablet Take 1 tablet (5 mg total) by mouth once daily. 90 tablet 3    lancets 32 gauge Misc 1 lancet  by Misc.(Non-Drug; Combo Route) route 2 times daily 2 hours after meal. 100 each 5    lisinopriL (PRINIVIL,ZESTRIL) 20 MG tablet Take 1 tablet (20 mg total) by mouth once daily. for 90 days 90 tablet 3    metFORMIN (GLUCOPHAGE) 1000 MG tablet Take 1 tablet (1,000 mg total) by mouth 2 (two) times daily. 180 tablet 3    tamsulosin (FLOMAX) 0.4 mg Cap Take 2 capsules (0.8 mg total) by mouth once daily. 180 capsule 3    aspirin (ECOTRIN) 81 MG EC tablet Take 1 tablet (81 mg total) by mouth once daily. 90  tablet 3    nystatin (MYCOSTATIN) powder Apply topically 4 (four) times daily. (Patient not taking: Reported on 12/27/2024) 30 g 0     No current facility-administered medications for this visit.       Review of Systems:  General: No fever, chills, fatigability, or weight loss.  Skin: No rashes, itching, or changes in color or texture of skin.  Chest: Denies HYMAN, cyanosis, wheezing, cough, and sputum production.  Abdomen: Appetite fine. No weight loss. Denies diarrhea, abdominal pain, hematemesis, or blood in stool.  Musculoskeletal: No joint stiffness or swelling. Denies back pain.  : As above.  All other review of systems negative.    PMH:  Past Medical History:   Diagnosis Date    Aneurysm of ascending aorta     Cataract     CKD     DM     HFpEF     HTN        PSH:  Past Surgical History:   Procedure Laterality Date    stitches  09/14/2022    left fore finger       FamHx:  Family History   Problem Relation Name Age of Onset    Hypertension Father      Diabetes Brother         SocHx:  Social History     Socioeconomic History    Marital status:     Number of children: 2   Tobacco Use    Smoking status: Never    Smokeless tobacco: Never   Substance and Sexual Activity    Alcohol use: Not Currently    Drug use: Never    Sexual activity: Not Currently     Social Drivers of Health     Financial Resource Strain: Low Risk  (5/13/2024)    Overall Financial Resource Strain (CARDIA)     Difficulty of Paying Living Expenses: Not hard at all   Food Insecurity: No Food Insecurity (5/13/2024)    Hunger Vital Sign     Worried About Running Out of Food in the Last Year: Never true     Ran Out of Food in the Last Year: Never true   Transportation Needs: No Transportation Needs (5/13/2024)    TRANSPORTATION NEEDS     Transportation : No   Physical Activity: Sufficiently Active (5/13/2024)    Exercise Vital Sign     Days of Exercise per Week: 5 days     Minutes of Exercise per Session: 60 min   Stress: No Stress Concern  Present (5/13/2024)    [employer] - Occupational Stress Questionnaire     Feeling of Stress : Not at all   Housing Stability: Low Risk  (5/13/2024)    Housing Stability Vital Sign     Unable to Pay for Housing in the Last Year: No     Homeless in the Last Year: No       Physical Exam:  Vitals:    12/27/24 0806   BP: (!) 155/71   Pulse: (!) 51   Resp: 19   Temp: 97.9 °F (36.6 °C)     General: A&Ox3, no apparent distress, no deformities  Neck: No masses, normal thyroid  Lungs: CTA angela, no use of accessory muscles  Heart: RRR, no arrhythmias  Abdomen: Soft, NT, ND, no masses, no hernias, no hepatosplenomegaly  Lymphatic: Neck and groin nodes negative  Skin: The skin is warm and dry. No jaundice.  Ext: No c/c/e.    GUMale: Test desc angela, no abnormalities of epididymus. Penis circumcised, with normal penile and scrotal skin. Meatus normal. Normal rectal tone, no hemorrhoids. Prostate: 2 finger breadth wide, flat, smooth, soft, nontender, no nodules or masses appreciated. SV not palpable. Perineum and anus normal.        Labs:    Latest Reference Range & Units 02/02/23 10:08 08/10/23 09:50 02/15/24 08:41 06/25/24 09:39 09/30/24 07:36 12/24/24 08:36   Prostate Specific Antigen <=4.00 ng/mL 2.44 1.94 1.69 2.43 1.42 1.10       Impression:  BPH    Plan:  Instructed patient to discontinue tamsulosin start alfuzosin 10 mg p.o. daily.   Instructed patient on timed voiding every 2-3 hrs, double voiding, avoidance of bladder irritants such as alcohol, citrus foods, chocolate, caffeinated drinks, energy drinks, spicy foods, sugar, caffeine products, sodas. Instructed patient to avoid drinking fluids 1-2 hours prior to bedtime & void immediately before bedtime.   RTC virtual visit one-month and six-month with PSA.  Instructed patient if develops any abnormal urologic symptoms notify clinic to be re-evaluate treated or during after hours go to emergency room versus urgent  here.                           [facility]

## 2024-12-27 NOTE — PROGRESS NOTES
Patient seen by Jaime Stanford NP. Patient instructed to RTC in 6 months with PSA & 1 month virtual for med check.

## 2025-01-29 DIAGNOSIS — N40.1 BENIGN PROSTATIC HYPERPLASIA WITH URINARY FREQUENCY: Primary | ICD-10-CM

## 2025-01-29 DIAGNOSIS — R35.0 BENIGN PROSTATIC HYPERPLASIA WITH URINARY FREQUENCY: Primary | ICD-10-CM

## 2025-01-29 RX ORDER — ALFUZOSIN HYDROCHLORIDE 10 MG/1
10 TABLET, EXTENDED RELEASE ORAL
Qty: 30 TABLET | Refills: 11 | Status: SHIPPED | OUTPATIENT
Start: 2025-01-29 | End: 2026-01-29

## 2025-02-10 ENCOUNTER — LAB VISIT (OUTPATIENT)
Dept: LAB | Facility: HOSPITAL | Age: 75
End: 2025-02-10
Attending: STUDENT IN AN ORGANIZED HEALTH CARE EDUCATION/TRAINING PROGRAM
Payer: MEDICARE

## 2025-02-10 DIAGNOSIS — E11.22 TYPE 2 DIABETES MELLITUS WITH CHRONIC KIDNEY DISEASE, WITHOUT LONG-TERM CURRENT USE OF INSULIN, UNSPECIFIED CKD STAGE: ICD-10-CM

## 2025-02-10 LAB
EST. AVERAGE GLUCOSE BLD GHB EST-MCNC: 168.6 MG/DL
HBA1C MFR BLD: 7.5 %

## 2025-02-10 PROCEDURE — 36415 COLL VENOUS BLD VENIPUNCTURE: CPT

## 2025-02-10 PROCEDURE — 83036 HEMOGLOBIN GLYCOSYLATED A1C: CPT

## 2025-02-17 ENCOUNTER — OFFICE VISIT (OUTPATIENT)
Dept: INTERNAL MEDICINE | Facility: CLINIC | Age: 75
End: 2025-02-17
Payer: MEDICARE

## 2025-02-17 VITALS
DIASTOLIC BLOOD PRESSURE: 70 MMHG | OXYGEN SATURATION: 100 % | RESPIRATION RATE: 18 BRPM | WEIGHT: 159 LBS | BODY MASS INDEX: 24.18 KG/M2 | TEMPERATURE: 98 F | SYSTOLIC BLOOD PRESSURE: 132 MMHG | HEART RATE: 51 BPM

## 2025-02-17 DIAGNOSIS — I10 PRIMARY HYPERTENSION: ICD-10-CM

## 2025-02-17 DIAGNOSIS — R35.0 BENIGN PROSTATIC HYPERPLASIA WITH URINARY FREQUENCY: ICD-10-CM

## 2025-02-17 DIAGNOSIS — N40.1 BENIGN PROSTATIC HYPERPLASIA WITH URINARY FREQUENCY: ICD-10-CM

## 2025-02-17 DIAGNOSIS — E11.9 TYPE 2 DIABETES MELLITUS WITHOUT COMPLICATION, WITHOUT LONG-TERM CURRENT USE OF INSULIN: Primary | ICD-10-CM

## 2025-02-17 DIAGNOSIS — N18.2 STAGE 2 CHRONIC KIDNEY DISEASE: ICD-10-CM

## 2025-02-17 PROCEDURE — 99213 OFFICE O/P EST LOW 20 MIN: CPT | Mod: PBBFAC | Performed by: STUDENT IN AN ORGANIZED HEALTH CARE EDUCATION/TRAINING PROGRAM

## 2025-02-17 RX ORDER — ALFUZOSIN HYDROCHLORIDE 10 MG/1
10 TABLET, EXTENDED RELEASE ORAL
Qty: 40 TABLET | Refills: 0 | Status: SHIPPED | OUTPATIENT
Start: 2025-02-24 | End: 2025-04-05

## 2025-02-17 RX ORDER — ALFUZOSIN HYDROCHLORIDE 10 MG/1
10 TABLET, EXTENDED RELEASE ORAL
Qty: 90 TABLET | Refills: 3 | Status: SHIPPED | OUTPATIENT
Start: 2025-04-01 | End: 2026-04-01

## 2025-02-17 NOTE — PROGRESS NOTES
Attending Physician Addendum  Management and plan discussed with resident at time of clinic visit. Care was reasonable and  necessary. Routine follow up.  I have seen and discussed the care with the patient as well.

## 2025-02-17 NOTE — PROGRESS NOTES
[facility] INTERNAL MEDICINE  OUTPATIENT OFFICE VISIT NOTE    SUBJECTIVE:      Chief Complaint: Follow-up (Lab Results. Medication Refills)       HPI: Duran Espinoza is a 74 y.o. yo male w/ PMH of  has a past medical history of Aneurysm of ascending aorta, Cataract, CKD, DM, HFpEF, and HTN., who presents for follow up.      Today, patient request changing alfuzosin to 90-day supply to align with jardiance. Also states he plans to have right cataract surgery soon and will follow up.      Past Medical History:   has a past medical history of Aneurysm of ascending aorta, Cataract, CKD, DM, HFpEF, and HTN.     Past Surgical History:   has a past surgical history that includes stitches (09/14/2022).     Family History:  family history includes Diabetes in his brother; Hypertension in his father.     Social History:   reports that he has never smoked. He has never used smokeless tobacco. He reports that he does not currently use alcohol. He reports that he does not use drugs.     Allergies:  has no known allergies.     Home Medications:  Current Outpatient Medications   Medication Sig    alfuzosin (UROXATRAL) 10 mg Tb24 Take 1 tablet (10 mg total) by mouth daily with breakfast.    amLODIPine (NORVASC) 10 MG tablet Take 1 tablet (10 mg total) by mouth once daily. for 90 days    aspirin (ECOTRIN) 81 MG EC tablet Take 1 tablet (81 mg total) by mouth once daily.    atorvastatin (LIPITOR) 40 MG tablet Take 1 tablet (40 mg total) by mouth once daily.    blood sugar diagnostic Strp Use to check blood sugar once a day    blood-glucose meter kit by Other route. Use as instructed    empagliflozin (JARDIANCE) 10 mg tablet Take 1 tablet (10 mg total) by mouth once daily.    finasteride (PROSCAR) 5 mg tablet Take 1 tablet (5 mg total) by mouth once daily.    lancets 32 gauge Misc 1 lancet  by Misc.(Non-Drug; Combo Route) route 2 times daily 2 hours after meal.    lisinopriL (PRINIVIL,ZESTRIL) 20 MG tablet Take 1 tablet (20 mg total) by  mouth once daily. for 90 days    metFORMIN (GLUCOPHAGE) 1000 MG tablet Take 1 tablet (1,000 mg total) by mouth 2 (two) times daily.    alfuzosin (UROXATRAL) 10 mg Tb24 Take 1 tablet (10 mg total) by mouth daily with breakfast. (Patient not taking: Reported on 2/17/2025)    nystatin (MYCOSTATIN) powder Apply topically 4 (four) times daily. (Patient not taking: Reported on 2/17/2025)     No current facility-administered medications for this visit.         ROS:  Negative unless otherwise noted above.       OBJECTIVE:     Vital signs:   /70 (BP Location: Left arm, Patient Position: Sitting)   Pulse (!) 51   Temp 98.4 °F (36.9 °C) (Oral)   Resp 18   Wt 72.1 kg (159 lb)   SpO2 100%   BMI 24.18 kg/m²      Physical Examination:  General appearance: alert, cooperative and no distress  Head: Normocephalic, without obvious abnormality, atraumatic  Lungs: clear to auscultation bilaterally  Heart: bradycardic, regular rate, S1, S2 normal, systolic murmur, no click, rub or gallop  Abdomen: soft, non-tender; bowel sounds normal; no masses,  no organomegaly  Extremities: extremities normal, atraumatic, no cyanosis or edema  Skin: Skin color, texture, turgor normal. No rashes or lesions  Neurologic: Grossly normal      Labs:  CMP:   Lab Results   Component Value Date    GLUCOSE 152 (H) 12/05/2024    CALCIUM 10.5 (H) 12/05/2024    ALBUMIN 3.7 12/05/2024     12/05/2024    K 4.3 12/05/2024    CO2 30 12/05/2024     12/05/2024    BUN 17.7 12/05/2024    CREATININE 1.29 (H) 12/05/2024    ALKPHOS 54 12/05/2024    ALT 16 12/05/2024    AST 16 12/05/2024    BILITOT 0.6 12/05/2024      CBC:   Lab Results   Component Value Date    WBC 4.18 (L) 05/13/2024    HGB 13.0 (L) 05/13/2024    HCT 39.4 (L) 05/13/2024    MCV 94.7 (H) 05/13/2024    RDW 13.4 05/13/2024     FLP:   Lab Results   Component Value Date    CHOL 151 05/13/2024    HDL 67 (H) 05/13/2024    LDL 80.00 05/13/2024    TRIG 18 (L) 05/13/2024    TOTALCHOLEST 2  05/13/2024     DM:   Lab Results   Component Value Date    HGBA1C 7.5 (H) 02/10/2025    .6 02/10/2025    CREATININE 1.29 (H) 12/05/2024    CREATRANDUR 106.4 12/05/2024    CREATRANDUR 106.7 12/05/2024    PROTEINURINE <6.8 12/05/2024     Anemia:   Lab Results   Component Value Date    IRON 80 10/10/2023    TIBC 297 10/10/2023    FERRITIN 73.91 10/10/2023    [id number] 394 09/30/2024    FOLATE 10.9 09/30/2024         ASSESSMENT & PLAN:     Aneurysm in ascending aorta- Stable  Possibly tertiary syphilis manifestation  4.6 cm on CT thorax in 9/2018  4.7 cm on CT thorax in 10/2019  echocardiogram 9/2018-EF 60%  advised on good control of BP (systolic goal 110-120)  following a Cardiologist in [place]. Last visit on 11/2021.  Per patient at 11/2022 C visit, [place] states aneurysm is stable per routine imaging with no indication for surgery.   Seen by Kurtis Pollock in 4/2023: TTE with moderate dilated ascending aorta, stable with no plans for surgical intervention at this time  Goal SBP <130    Tertiary syphilis- treated  Syphilis positive on 2/2021- dils 2, repeat on 6/2021 with dils 0  h/o Aortic aneurysm, possibly tertiary involvement  s/p 3x Bicillin injections completed    HTN  Sinus bradycardia  130/64, HR 55 today  cont Norvasc to 10 mg daily, lisinopril 20 mg daily  advised to keep a BP log, and to follow low salt diet    DM  Well controlled  A1c 7.5 in 2/2025, A1c 7.1 in 9/2024  Discussed lifestyle modification: pt reports more unhealthy snacks in the evening throughout the winter but states he will transition back to nuts and fruit  Should A1c remian elevated at f/u, can consider 2nd diabetic agent  Currently on ACEI  Continue on metformin to 1000 mg bid  Continue Jardiance 10 mg daily, prev d/c'd tradjenta  Diabetic eye exam- follows ophtho, last seen 2/9/2023, no retinopathy, DM eye exam ordered 5/13/2024  Diabetic foot exam done 12/2023  UPC 0.0 on 6/25/2024, Urine microalb:creatinine  ordered 10/2024  advised on maintaining a cbg log an following diabetic diet and daily exercise    CKD stage 2  At baseline poor PO water intake, has been encouraged to drink more every clinic visit  BUN 24.2, Cr 1.37, GFR 54 in 5/2024  BUN 19.9, Cr 1.28, GFR 59 in 10/2023  avoid nephrotoxic meds  Following with nephrology, appreciate their assistance    HFpEF  Echo 9/2018-EF 60%  FLP 5/2024: , HDL 67, TG 18, LDL 80 (prev LDL 76 in 11/2022)  cont aspirin, acei, statin  holding bb due to hx of bradycardia    Chronic mild leukopenia, 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 ethnic leukopenia   Negative HIV, hep panel    BPH with urinary incontinency  PSA wnl 2/2023  Previously on tamsulosin  Following with urology: recently started alfuzosin 0 mg daily in 12/2024    Onicomycosis/toe fungus - resolved  - provided nystatin powder for feet bilaterally in 12/2023    Macrocytic anemia  - Hgb 13, Hct 39.4, MCV 94.7 in 5/2024  - Negative cologuard 2022, Colonoscopy with diverticulosis in 2/2024  - Normal iron panel in 2023  - Folate, B12 wnl in 9/2024    Healthcare maintenance   labs (CBC, CMP, FLP, A1c, TSH, vitD): 12/2024  HIV/Hep panel/syphilis: HIV/Hep neg in 10/2021, Syph reactive 6/2021 s/p treatment  Pneumococcal vaccine: UTD, PSV23 2021, PSV13 2016  Tdap: UTD, 2022  Zoster vaccine: UTD, 2021  Flu: 10/2024  FIT/colonoscopy: cologuard negative 7/2022  Lung cancer screening (LDCT age 50-80): na  AAA screening (men, age 65-75, smoking history): never smoker      Return to clinic in 3 months with A1c.      Lennie Casillas MD  [employer] [facility], PR[facility]  2/17/2025

## 2025-04-02 DIAGNOSIS — R35.0 BENIGN PROSTATIC HYPERPLASIA WITH URINARY FREQUENCY: ICD-10-CM

## 2025-04-02 DIAGNOSIS — E11.22 TYPE 2 DIABETES MELLITUS WITH CHRONIC KIDNEY DISEASE, WITHOUT LONG-TERM CURRENT USE OF INSULIN, UNSPECIFIED CKD STAGE: ICD-10-CM

## 2025-04-02 DIAGNOSIS — N40.1 BENIGN PROSTATIC HYPERPLASIA WITH URINARY FREQUENCY: ICD-10-CM

## 2025-04-02 DIAGNOSIS — N18.9 CHRONIC KIDNEY DISEASE, UNSPECIFIED CKD STAGE: ICD-10-CM

## 2025-04-02 DIAGNOSIS — I10 HYPERTENSION, UNSPECIFIED TYPE: ICD-10-CM

## 2025-04-02 RX ORDER — AMLODIPINE BESYLATE 10 MG/1
10 TABLET ORAL DAILY
Qty: 90 TABLET | Refills: 3 | Status: SHIPPED | OUTPATIENT
Start: 2025-04-02 | End: 2026-03-28

## 2025-04-02 RX ORDER — LISINOPRIL 20 MG/1
20 TABLET ORAL DAILY
Qty: 90 TABLET | Refills: 3 | Status: SHIPPED | OUTPATIENT
Start: 2025-04-02 | End: 2026-04-02

## 2025-04-02 RX ORDER — ASPIRIN 81 MG/1
81 TABLET ORAL DAILY
Qty: 90 TABLET | Refills: 3 | Status: SHIPPED | OUTPATIENT
Start: 2025-04-02 | End: 2026-04-02

## 2025-04-02 RX ORDER — ATORVASTATIN CALCIUM 40 MG/1
40 TABLET, FILM COATED ORAL DAILY
Qty: 90 TABLET | Refills: 3 | Status: SHIPPED | OUTPATIENT
Start: 2025-04-02 | End: 2026-04-02

## 2025-04-02 RX ORDER — METFORMIN HYDROCHLORIDE 1000 MG/1
1000 TABLET ORAL 2 TIMES DAILY
Qty: 180 TABLET | Refills: 3 | Status: SHIPPED | OUTPATIENT
Start: 2025-04-02 | End: 2026-04-02

## 2025-05-09 ENCOUNTER — LAB VISIT (OUTPATIENT)
Dept: LAB | Facility: HOSPITAL | Age: 75
End: 2025-05-09
Attending: STUDENT IN AN ORGANIZED HEALTH CARE EDUCATION/TRAINING PROGRAM
Payer: MEDICARE

## 2025-05-09 DIAGNOSIS — E11.9 TYPE 2 DIABETES MELLITUS WITHOUT COMPLICATION, WITHOUT LONG-TERM CURRENT USE OF INSULIN: ICD-10-CM

## 2025-05-09 LAB
EST. AVERAGE GLUCOSE BLD GHB EST-MCNC: 168.6 MG/DL
HBA1C MFR BLD: 7.5 %

## 2025-05-09 PROCEDURE — 36415 COLL VENOUS BLD VENIPUNCTURE: CPT

## 2025-05-09 PROCEDURE — 83036 HEMOGLOBIN GLYCOSYLATED A1C: CPT

## 2025-05-12 ENCOUNTER — OFFICE VISIT (OUTPATIENT)
Dept: INTERNAL MEDICINE | Facility: CLINIC | Age: 75
End: 2025-05-12
Payer: MEDICARE

## 2025-05-12 ENCOUNTER — LAB VISIT (OUTPATIENT)
Dept: LAB | Facility: HOSPITAL | Age: 75
End: 2025-05-12
Attending: NURSE PRACTITIONER
Payer: MEDICARE

## 2025-05-12 VITALS
RESPIRATION RATE: 19 BRPM | HEIGHT: 68 IN | BODY MASS INDEX: 24.43 KG/M2 | SYSTOLIC BLOOD PRESSURE: 116 MMHG | WEIGHT: 161.19 LBS | DIASTOLIC BLOOD PRESSURE: 62 MMHG | OXYGEN SATURATION: 98 % | TEMPERATURE: 98 F | HEART RATE: 50 BPM

## 2025-05-12 DIAGNOSIS — N18.30 ANEMIA, CHRONIC RENAL FAILURE, STAGE 3 (MODERATE): ICD-10-CM

## 2025-05-12 DIAGNOSIS — I71.9 AORTIC ANEURYSM WITHOUT RUPTURE, UNSPECIFIED PORTION OF AORTA: ICD-10-CM

## 2025-05-12 DIAGNOSIS — I10 PRIMARY HYPERTENSION: Primary | ICD-10-CM

## 2025-05-12 DIAGNOSIS — E11.9 TYPE 2 DIABETES MELLITUS WITHOUT COMPLICATION, WITHOUT LONG-TERM CURRENT USE OF INSULIN: ICD-10-CM

## 2025-05-12 DIAGNOSIS — D63.1 ANEMIA, CHRONIC RENAL FAILURE, STAGE 3 (MODERATE): ICD-10-CM

## 2025-05-12 DIAGNOSIS — Z12.12 ENCOUNTER FOR COLORECTAL CANCER SCREENING: ICD-10-CM

## 2025-05-12 DIAGNOSIS — E83.52 HYPERCALCEMIA: ICD-10-CM

## 2025-05-12 DIAGNOSIS — N18.31 CKD STAGE G3A/A1, GFR 45-59 AND ALBUMIN CREATININE RATIO <30 MG/G: ICD-10-CM

## 2025-05-12 DIAGNOSIS — N40.1 BENIGN PROSTATIC HYPERPLASIA WITH URINARY FREQUENCY: ICD-10-CM

## 2025-05-12 DIAGNOSIS — R35.0 BENIGN PROSTATIC HYPERPLASIA WITH URINARY FREQUENCY: ICD-10-CM

## 2025-05-12 DIAGNOSIS — E11.9 TYPE 2 DIABETES MELLITUS WITHOUT COMPLICATION, WITHOUT LONG-TERM CURRENT USE OF INSULIN: Primary | ICD-10-CM

## 2025-05-12 DIAGNOSIS — N18.31 CKD STAGE 3A, GFR 45-59 ML/MIN: ICD-10-CM

## 2025-05-12 DIAGNOSIS — Z12.11 ENCOUNTER FOR COLORECTAL CANCER SCREENING: ICD-10-CM

## 2025-05-12 PROBLEM — N18.2 STAGE 2 CHRONIC KIDNEY DISEASE: Status: RESOLVED | Noted: 2025-02-17 | Resolved: 2025-05-12

## 2025-05-12 LAB
25(OH)D3+25(OH)D2 SERPL-MCNC: 46 NG/ML (ref 30–80)
ALBUMIN SERPL-MCNC: 3.7 G/DL (ref 3.4–4.8)
ALBUMIN/GLOB SERPL: 1.1 RATIO (ref 1.1–2)
ALP SERPL-CCNC: 48 UNIT/L (ref 40–150)
ALT SERPL-CCNC: 20 UNIT/L (ref 0–55)
ANION GAP SERPL CALC-SCNC: 11 MEQ/L
AST SERPL-CCNC: 16 UNIT/L (ref 11–45)
BACTERIA #/AREA URNS AUTO: ABNORMAL /HPF
BASOPHILS # BLD AUTO: 0.03 X10(3)/MCL
BASOPHILS NFR BLD AUTO: 0.7 %
BILIRUB SERPL-MCNC: 0.4 MG/DL
BILIRUB UR QL STRIP.AUTO: NEGATIVE
BUN SERPL-MCNC: 26 MG/DL (ref 8.4–25.7)
CALCIUM SERPL-MCNC: 9.8 MG/DL (ref 8.8–10)
CHLORIDE SERPL-SCNC: 108 MMOL/L (ref 98–107)
CHOLEST SERPL-MCNC: 156 MG/DL
CHOLEST/HDLC SERPL: 2 {RATIO} (ref 0–5)
CLARITY UR: CLEAR
CO2 SERPL-SCNC: 22 MMOL/L (ref 23–31)
COLOR UR AUTO: COLORLESS
CREAT SERPL-MCNC: 1.19 MG/DL (ref 0.72–1.25)
CREAT UR-MCNC: 46.6 MG/DL (ref 63–166)
CREAT/UREA NIT SERPL: 22
EOSINOPHIL # BLD AUTO: 0.12 X10(3)/MCL (ref 0–0.9)
EOSINOPHIL NFR BLD AUTO: 2.8 %
ERYTHROCYTE [DISTWIDTH] IN BLOOD BY AUTOMATED COUNT: 13.8 % (ref 11.5–17)
GFR SERPLBLD CREATININE-BSD FMLA CKD-EPI: >60 ML/MIN/1.73/M2
GLOBULIN SER-MCNC: 3.4 GM/DL (ref 2.4–3.5)
GLUCOSE SERPL-MCNC: 177 MG/DL (ref 82–115)
GLUCOSE UR QL STRIP: ABNORMAL
HCT VFR BLD AUTO: 40.9 % (ref 42–52)
HDLC SERPL-MCNC: 70 MG/DL (ref 35–60)
HGB BLD-MCNC: 13.3 G/DL (ref 14–18)
HGB UR QL STRIP: NEGATIVE
HYALINE CASTS #/AREA URNS LPF: ABNORMAL /LPF
IMM GRANULOCYTES # BLD AUTO: 0.01 X10(3)/MCL (ref 0–0.04)
IMM GRANULOCYTES NFR BLD AUTO: 0.2 %
KETONES UR QL STRIP: NEGATIVE
LDLC SERPL CALC-MCNC: 74 MG/DL (ref 50–140)
LEUKOCYTE ESTERASE UR QL STRIP: NEGATIVE
LYMPHOCYTES # BLD AUTO: 1.01 X10(3)/MCL (ref 0.6–4.6)
LYMPHOCYTES NFR BLD AUTO: 23.6 %
MCH RBC QN AUTO: 31.8 PG (ref 27–31)
MCHC RBC AUTO-ENTMCNC: 32.5 G/DL (ref 33–36)
MCV RBC AUTO: 97.8 FL (ref 80–94)
MONOCYTES # BLD AUTO: 0.35 X10(3)/MCL (ref 0.1–1.3)
MONOCYTES NFR BLD AUTO: 8.2 %
NEUTROPHILS # BLD AUTO: 2.76 X10(3)/MCL (ref 2.1–9.2)
NEUTROPHILS NFR BLD AUTO: 64.5 %
NITRITE UR QL STRIP: NEGATIVE
NRBC BLD AUTO-RTO: 0 %
PH UR STRIP: 5.5 [PH]
PHOSPHATE SERPL-MCNC: 3.3 MG/DL (ref 2.3–4.7)
PLATELET # BLD AUTO: 218 X10(3)/MCL (ref 130–400)
PMV BLD AUTO: 10.6 FL (ref 7.4–10.4)
POTASSIUM SERPL-SCNC: 4.3 MMOL/L (ref 3.5–5.1)
PROT SERPL-MCNC: 7.1 GM/DL (ref 5.8–7.6)
PROT UR QL STRIP: NEGATIVE
PROT UR STRIP-MCNC: <6.8 MG/DL
PSA SERPL-MCNC: 1.72 NG/ML
PTH-INTACT SERPL-MCNC: 57.3 PG/ML (ref 8.7–77)
RBC # BLD AUTO: 4.18 X10(6)/MCL (ref 4.7–6.1)
RBC #/AREA URNS AUTO: ABNORMAL /HPF
SODIUM SERPL-SCNC: 141 MMOL/L (ref 136–145)
SP GR UR STRIP.AUTO: 1.02 (ref 1–1.03)
SQUAMOUS #/AREA URNS LPF: ABNORMAL /HPF
TRIGL SERPL-MCNC: 60 MG/DL (ref 34–140)
UROBILINOGEN UR STRIP-ACNC: NORMAL
VLDLC SERPL CALC-MCNC: 12 MG/DL
WBC # BLD AUTO: 4.28 X10(3)/MCL (ref 4.5–11.5)
WBC #/AREA URNS AUTO: ABNORMAL /HPF

## 2025-05-12 PROCEDURE — 80061 LIPID PANEL: CPT

## 2025-05-12 PROCEDURE — 99215 OFFICE O/P EST HI 40 MIN: CPT | Mod: PBBFAC

## 2025-05-12 PROCEDURE — 36415 COLL VENOUS BLD VENIPUNCTURE: CPT

## 2025-05-12 PROCEDURE — 82570 ASSAY OF URINE CREATININE: CPT

## 2025-05-12 PROCEDURE — 82306 VITAMIN D 25 HYDROXY: CPT

## 2025-05-12 PROCEDURE — 85025 COMPLETE CBC W/AUTO DIFF WBC: CPT

## 2025-05-12 PROCEDURE — 84100 ASSAY OF PHOSPHORUS: CPT

## 2025-05-12 PROCEDURE — 84153 ASSAY OF PSA TOTAL: CPT

## 2025-05-12 PROCEDURE — 83970 ASSAY OF PARATHORMONE: CPT

## 2025-05-12 PROCEDURE — 81015 MICROSCOPIC EXAM OF URINE: CPT

## 2025-05-12 PROCEDURE — 80053 COMPREHEN METABOLIC PANEL: CPT

## 2025-05-12 RX ORDER — IBUPROFEN 100 MG/5ML
1000 SUSPENSION, ORAL (FINAL DOSE FORM) ORAL DAILY
COMMUNITY

## 2025-05-12 RX ORDER — CHOLECALCIFEROL (VITAMIN D3) 25 MCG
1000 TABLET ORAL DAILY
COMMUNITY

## 2025-05-12 NOTE — PROGRESS NOTES
[facility] INTERNAL MEDICINE  OUTPATIENT OFFICE VISIT NOTE    SUBJECTIVE:      Chief Complaint: Follow-up (Pt here today for f/u visit. No distress noted at this time.Took a.m.. )       HPI: Duran Espinoza is a 74 y.o. yo male w/ PMH of  has a past medical history of Aneurysm of ascending aorta ( 4.6 cm in 2019), Bicuspid aortic valve, Cataract, CKD, T2DM, HFpEF, and HTN, who presents for follow up.      No complaints today. Reports he started working again. Checks his BP at home, always normal. Reports has white coat HTN. Doesnot check his glucose at home, does not like the needles. No other complains today. Denies any chest pain, shortness of breath, weight loss, abdominal pain, orthopnea.     Follows up with cardiology in [place]. Also states he plans to have right cataract surgery soon and will follow up.      Past Medical History:   has a past medical history of Aneurysm of ascending aorta, Cataract, CKD, DM, HFpEF, and HTN.     Past Surgical History:   has a past surgical history that includes stitches (09/14/2022).     Family History:  family history includes Diabetes in his brother; Hypertension in his father.     Social History:   reports that he has never smoked. He has never used smokeless tobacco. He reports that he does not currently use alcohol. He reports that he does not use drugs.     Allergies:  has no known allergies.     Home Medications:  Current Outpatient Medications   Medication Sig    alfuzosin (UROXATRAL) 10 mg Tb24 Take 1 tablet (10 mg total) by mouth daily with breakfast.    amLODIPine (NORVASC) 10 MG tablet Take 1 tablet (10 mg total) by mouth once daily. for 90 days    aspirin (ECOTRIN) 81 MG EC tablet Take 1 tablet (81 mg total) by mouth once daily.    atorvastatin (LIPITOR) 40 MG tablet Take 1 tablet (40 mg total) by mouth once daily.    empagliflozin (JARDIANCE) 10 mg tablet Take 1 tablet (10 mg total) by mouth once daily.    finasteride (PROSCAR) 5 mg tablet Take 1 tablet (5 mg total)  "by mouth once daily.    lisinopriL (PRINIVIL,ZESTRIL) 20 MG tablet Take 1 tablet (20 mg total) by mouth once daily. for 90 days    metFORMIN (GLUCOPHAGE) 1000 MG tablet Take 1 tablet (1,000 mg total) by mouth 2 (two) times daily.    vitamin D (VITAMIN D3) 1000 units Tab Take 1,000 Units by mouth once daily.    ascorbic acid, vitamin C, (VITAMIN C) 1000 MG tablet Take 1,000 mg by mouth once daily.    blood sugar diagnostic Strp Use to check blood sugar once a day (Patient not taking: Reported on 5/12/2025)    blood-glucose meter kit by Other route. Use as instructed (Patient not taking: Reported on 5/12/2025)    lancets 32 gauge Misc 1 lancet  by Misc.(Non-Drug; Combo Route) route 2 times daily 2 hours after meal. (Patient not taking: Reported on 5/12/2025)     No current facility-administered medications for this visit.         ROS:  Negative unless otherwise noted above.       OBJECTIVE:     Vital signs:   /62 (BP Location: Right arm, Patient Position: Sitting)   Pulse (!) 50   Temp 98.2 °F (36.8 °C) (Oral)   Resp 19   Ht 5' 8" (1.727 m)   Wt 73.1 kg (161 lb 3.2 oz)   SpO2 98%   BMI 24.51 kg/m²      Physical Examination:  General appearance: alert, cooperative and no distress  Head: Normocephalic, without obvious abnormality, atraumatic  Lungs: clear to auscultation bilaterally  Heart: bradycardic, regular rate, S1, S2 normal, systolic murmur, no click, rub or gallop  Abdomen: soft, non-tender; bowel sounds normal; no masses,  no organomegaly  Extremities: extremities normal, atraumatic, no cyanosis or edema  Skin: Skin color, texture, turgor normal. No rashes or lesions  Neurologic: Grossly normal    Diabetic foot exam:   Left:    Proprioception normal   Sharp/dull discrimination normal   Filament test present  Right:    Proprioception normal   Sharp/dull discrimination normal   Filament test present        Labs:  CMP:   Lab Results   Component Value Date    CALCIUM 10.5 (H) 12/05/2024    ALBUMIN 3.7 " 12/05/2024    PROT 6.7 12/05/2024     12/05/2024    K 4.3 12/05/2024    CO2 30 12/05/2024     12/05/2024    BUN 17.7 12/05/2024    CREATININE 1.29 (H) 12/05/2024    ALKPHOS 54 12/05/2024    ALT 16 12/05/2024    AST 16 12/05/2024    BILITOT 0.6 12/05/2024      CBC:   Lab Results   Component Value Date    WBC 4.28 (L) 05/12/2025    HGB 13.3 (L) 05/12/2025    HCT 40.9 (L) 05/12/2025    MCV 97.8 (H) 05/12/2025    RDW 13.8 05/12/2025     FLP:   Lab Results   Component Value Date    CHOL 151 05/13/2024    HDL 67 (H) 05/13/2024    LDL 80.00 05/13/2024    TRIG 18 (L) 05/13/2024    TOTALCHOLEST 2 05/13/2024     DM:   Lab Results   Component Value Date    HGBA1C 7.5 (H) 05/09/2025    .6 05/09/2025    CREATININE 1.29 (H) 12/05/2024    CREATRANDUR 106.4 12/05/2024    CREATRANDUR 106.7 12/05/2024    PROTEINURINE <6.8 12/05/2024     Anemia:   Lab Results   Component Value Date    IRON 80 10/10/2023    TIBC 297 10/10/2023    FERRITIN 73.91 10/10/2023    [id number] 394 09/30/2024    FOLATE 10.9 09/30/2024     Echo 06/2024  LV:       LV size is normal. Biplane LV ejection fraction= 61% Normal             average LV global longitudinal strain at -23.1%. LV EF is             normal. Overall wall motion is normal.   RV:       RV size is normal. RV systolic function is normal.   LA:       LA volume is mildly enlarged.   RA:       RA size is normal.   AO:       Aortic root diameter is normal in size. Ascending aorta             diameter is moderately enlarged (4.5 cm).   JONAH:     No pericardial effusion.   AV:       Mild thickening and calcification of AV leaflets (trileaflet             valve).   MV:       No structural MV abnormalities noted.   PV:       No structural PV abnormalities noted.   TV:       No structural TV abnormalities noted. Mild tricuspid             regurgitation   Palm:     Diastolic dysfunction Grade I (Mild): Impaired relaxation             with normal LV filling pressures.   Other:    Estimated  PA systolic pressure is 36 mmHg, assuming a mean             RAP of 5 mmHg.       ASSESSMENT & PLAN:     Aneurysm in ascending aorta- Stable  Possibly tertiary syphilis manifestation  4.6 cm on CT thorax in 9/2018, Recent Echo (2024) 4.5cm dilation - stable   advised on good control of BP (systolic goal 110-120)  followed a Cardiologist in [place]. Last visit on 11/2021 :Per patient at 11/2022 [facility] visit, [place] states aneurysm is stable per routine imaging with no indication for surgery.   Seen by Kurtis Pollock in 6/7/24:  TTE with moderate dilated ascending aorta, stable with no plans for surgical intervention at this time  Goal SBP <130    Tertiary syphilis- treated  Syphilis positive on 2/2021- dils 2, repeat on 6/2021 with dils 0  h/o Aortic aneurysm, possibly tertiary involvement  s/p 3x Bicillin injections completed    HTN  Sinus bradycardia  116/62, HR 50 today  cont Norvasc to 10 mg daily, lisinopril 20 mg daily  advised to keep a BP log, and to follow low salt diet    DM  Well controlled  A1c 7.5 in 5/2025, 7.5 ( 2/10/25), 7.1 (9/2024)  Discussed lifestyle modification: pt reports more unhealthy snacks in the evening throughout the winter but states he will try and change his diet.  Currently on ACEI  Continue on metformin to 1000 mg bid  Continue Jardiance 10 mg daily, prev d/c'd tradjenta  Diabetic eye exam- follows ophtho, last seen 9/2024, no retinopathy, DM eye exam ordered annually in 9/2025  Diabetic foot exam done today  UPC <6.8/106.4 on 6/25/2024, Urine microalb:creatinine: <5/106.7 (12/5/24)  advised on maintaining a cbg log an following diabetic diet and daily exercise    CKD stage 2  At baseline poor PO water intake, has been encouraged to drink more every clinic visit  BUN 24.2, Cr 1.37, GFR 54 in 5/2024  BUN 19.9, Cr 1.28, GFR 59 in 10/2023  avoid nephrotoxic meds  Following with nephrology, appreciate their assistance    HFpEF  Echo 9/2018-EF 60%, recent Echo in 2024 : Ef 61  %  FLP 5/2024: , HDL 67, TG 18, LDL 80 (prev LDL 76 in 11/2022)  cont aspirin, acei, statin  holding bb due to hx of bradycardia    Chronic mild leukopenia, 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 ethnic leukopenia   Negative HIV, hep panel    BPH with urinary incontinency  PSA wnl 2/2023  Previously on tamsulosin  Following with urology: recently started alfuzosin daily in 12/2024    Onicomycosis/toe fungus - resolved  - provided nystatin powder for feet bilaterally in 12/2023    Macrocytic anemia  - Hgb 13, Hct 39.4, MCV 94.7 in 5/2024  - Negative cologuard 2022, Colonoscopy with diverticulosis in 2/2024  - Normal iron panel in 2023  - Folate, B12 wnl in 9/2024    Healthcare maintenance   labs (CBC, CMP, FLP, A1c, TSH, vitD): 12/2024  HIV/Hep panel/syphilis: HIV/Hep neg in 10/2021, Syph reactive 6/2021 s/p treatment  Pneumococcal vaccine: UTD, PSV23 2021, PSV13 2016  Tdap: UTD, 2022  Zoster vaccine: UTD, 2021  Flu: 10/2024  FIT/colonoscopy: cologuard negative 7/2022, continue until age 85. Next one in 2027  Lung cancer screening (LDCT age 50-80): na  AAA screening (men, age 65-75, smoking history): never smoker      Return to clinic in 4 months       Tammie Long MD  Internal Medicine - PGY-1

## 2025-05-19 ENCOUNTER — TELEPHONE (OUTPATIENT)
Dept: OPHTHALMOLOGY | Facility: CLINIC | Age: 75
End: 2025-05-19
Payer: MEDICARE

## 2025-05-19 DIAGNOSIS — I10 PRIMARY HYPERTENSION: ICD-10-CM

## 2025-05-19 DIAGNOSIS — Q23.81 BICUSPID AORTIC VALVE: Primary | ICD-10-CM

## 2025-05-19 NOTE — TELEPHONE ENCOUNTER
Called patient regarding cardiac clearance for cataract surgery, patient states he has not seen the cardiologist in [place] for a year or 2, asked if he would agree to see cardiologist at [facility] for clearance and he agreed. Spoke to Dr. Trevino, states patient will need cardiac clearance due to cardiac history. New referral ordered for cardiac clearance sent to [facility] at [facility]. Patient aware he will be called with an appointment.

## 2025-05-21 ENCOUNTER — TELEPHONE (OUTPATIENT)
Dept: OPHTHALMOLOGY | Facility: CLINIC | Age: 75
End: 2025-05-21
Payer: MEDICARE

## 2025-05-21 NOTE — TELEPHONE ENCOUNTER
----- Message from Mitch sent at 5/21/2025  1:49 PM CDT -----  Patient called multiple times regarding cataract surgery,he stats his last appt he had he did a cat eval and doctor told him he had within a year to decide if he wants to do his surgery . Patient is not understanding why he has to do a new cat eval and why can't he just schedule for his surgery being that it was already discussed. He ask that he be called back regarding this matter    Returned Patient's call.  Patient voiced irritation upon the fact that he was told that he had a year to schedule surgery and when he called he was told that he had to do some other appointments and testing first. I explained that per protocol, our testing does have to be repeated after a certain time (around 6 months). Patient voiced irritation that this was not explained to him at his appointment in September. I apologized and explained that the appointment at the Ophthalmology clinic in June will be to update the testing, schedule surgery, and sign consents.    Patient then questioned an appointment that the hospital was supposed to call him to schedule. I explained that since he has some heart complications that he would have to see a cardiologist and get a clearance from them before we could proceed. A referral was sent on 05/19/2025. Patient asked when he would hear back about this appointment and I told him I would call  and check in with the cardiology department and call him later this afternoon.    Patient voiced understanding.

## 2025-06-04 DIAGNOSIS — E11.9 TYPE 2 DIABETES MELLITUS WITHOUT COMPLICATION, WITHOUT LONG-TERM CURRENT USE OF INSULIN: Primary | ICD-10-CM

## 2025-06-04 RX ORDER — METFORMIN HYDROCHLORIDE 1000 MG/1
1000 TABLET ORAL 2 TIMES DAILY
Qty: 180 TABLET | Refills: 3 | Status: SHIPPED | OUTPATIENT
Start: 2025-06-04 | End: 2026-06-04

## 2025-06-09 ENCOUNTER — TELEPHONE (OUTPATIENT)
Dept: OPHTHALMOLOGY | Facility: CLINIC | Age: 75
End: 2025-06-09
Payer: MEDICARE

## 2025-06-09 NOTE — TELEPHONE ENCOUNTER
Called [facility] and was told that the schedule was booked up until August. Spoke with Dr. Miller and she stated that we have a specialty clinic on Fridays with Dr. Britany Taylor or Alba Mckay to clear for surgery. Will find out more about this tomorrow. Called and informed patient, voiced understanding.

## 2025-06-10 ENCOUNTER — TELEPHONE (OUTPATIENT)
Dept: OPHTHALMOLOGY | Facility: CLINIC | Age: 75
End: 2025-06-10
Payer: MEDICARE

## 2025-06-10 NOTE — TELEPHONE ENCOUNTER
06/10/25  I called the cardiology clinic at [facility] regarding a referral we had sent for this patient, he has continued calling us telling us he had not heard anything about an appointment. I spoke with Leticia and she said they were pretty booked up but found an appointment for the patient on 07/24 due to a cancellation and she stated she would call and inform the patient as well of this appointment HENRY Bird, COA

## 2025-06-12 ENCOUNTER — TELEPHONE (OUTPATIENT)
Dept: OPHTHALMOLOGY | Facility: CLINIC | Age: 75
End: 2025-06-12
Payer: MEDICARE

## 2025-06-17 NOTE — PROGRESS NOTES
"  [facility]  Nephrology Clinic    Chief complaint: Follow-up and Chronic Kidney Disease (RTC, took meds, need to control blood sugar better)    History of present illness:   Duran Espinoza is a 74 y.o. Black or  male who presents to Nephrology clinic today for management of chronic kidney disease. The patient's pertinent chronic problems include  diabetes, hypertension, aortic aneurysm, and cataract.  Followed by [facility] for urinary incontinence. Denies complaints today, presents for follow up.     Review of Systems  12 point review of systems conducted, negative except as stated in the history of present illness.    Allergies: Patient has no known allergies.     Past Medical History:  has a past medical history of Aneurysm of ascending aorta, Cataract, CKD, DM, HFpEF, and HTN.    Procedure History:  has a past surgical history that includes stitches (09/14/2022).    Family History: family history includes Diabetes in his brother; Hypertension in his father.    Social History:  reports that he has never smoked. He has never used smokeless tobacco. He reports that he does not currently use alcohol. He reports that he does not use drugs.    Physical exam  /74   Pulse (!) 49   Temp 98.4 °F (36.9 °C) (Oral)   Resp 18   Ht 5' 7.72" (1.72 m)   Wt 73.2 kg (161 lb 6 oz)   SpO2 98%   BMI 24.74 kg/m²   General appearance: Patient is in no acute distress.  Skin: No rashes or wounds.  HEENT: PERRLA, EOMI, no scleral icterus, no JVD. Neck is supple.  Chest: Respirations are unlabored. Lungs sounds are clear.   Heart: S1, S2.   Abdomen: Benign.  : Deferred.  Extremities: No edema, peripheral pulses are palpable.   Neuro: No focal deficits.     Home Medications:  Current Medications[1]    Laboratory data    Lab Results   Component Value Date    WBC 4.28 (L) 05/12/2025    HGB 13.3 (L) 05/12/2025    HCT 40.9 (L) 05/12/2025     05/12/2025    IRON 80 10/10/2023 "    TIBC 297 10/10/2023    LABIRON 27 10/10/2023    FERRITIN 73.91 10/10/2023    FOLATE 10.9 09/30/2024    BUFZXUEE02 394 09/30/2024     05/12/2025    K 4.3 05/12/2025    CO2 22 (L) 05/12/2025    BUN 26.0 (H) 05/12/2025    CREATININE 1.19 05/12/2025    EGFRNORACEVR >60 05/12/2025    CALCIUM 9.8 05/12/2025    ALKPHOS 48 05/12/2025    ALBUMIN 3.7 05/12/2025    BILIDIR 0.2 11/05/2021    IBILI 0.20 11/05/2021    AST 16 05/12/2025    ALT 20 05/12/2025    MG 2.1 07/21/2020    PHOS 3.3 05/12/2025      Lab Results   Component Value Date    HGBA1C 7.5 (H) 05/09/2025    PTH 57.3 05/12/2025    [id number] 46 05/12/2025    HIV Nonreactive 10/04/2021    HEPCAB Nonreactive 10/04/2021     Urine:  Lab Results   Component Value Date    APPEARANCEUA Clear 05/12/2025    SGUA 1.024 05/12/2025    PROTEINUA Negative 05/12/2025    KETONESUA Negative 05/12/2025    LEUKOCYTESUR Negative 05/12/2025    RBCUA None Seen 05/12/2025    WBCUA 0-5 05/12/2025    BACTERIA None Seen 05/12/2025    SQEPUA None Seen 05/12/2025    HYALINECASTS None Seen 05/12/2025    CREATRANDUR 46.6 (L) 05/12/2025    PROTEINURINE <6.8 05/12/2025    UPROTCREA 0.0 06/25/2024         Imaging  Retroperitoneal ultrasound 8/23/2018   The right kidney measures 9.4 x 4.5 x 6.6 cm. There is a cortical cyst  present measuring 3.3 x 2.8 x 3.2 cm. There is no hydronephrosis.  The left kidney measures 12.8 x 5.8 x 6.1 cm. There are multiple cyst  present. The 2 largest measures 4.6 x 4.4 x 4.1 cm and 3.2 x 3.8 x 4.6  cm. There is no hydronephrosis.  IMPRESSION: Bilateral renal cyst.    Impression    ICD-10-CM ICD-9-CM   1. CKD stage G2/A1, GFR 60-89 and albumin creatinine ratio <30 mg/g  N18.2 585.2   2. Type 2 diabetes mellitus with stage 2 chronic kidney disease, without long-term current use of insulin  E11.22 250.40    N18.2 585.2   3. Primary hypertension  I10 401.9   4. Anemia of chronic disease  D63.8 285.29        Plan  CKD stage G2/A1, GFR 60-89 and albumin creatinine  ratio <30 mg/g  Renal indices are stable , there is no significant proteinuria or active urinary sediment.  Most likely etiology of CKD is diabetes and/or hypertension. Continue ACE inhibitor, SGLT2 inhibitor, renal sparing activities and periodic monitoring.      Type 2 diabetes mellitus with stage 2 chronic kidney disease, without long-term current use of insulin  A1C is at goal. Continue ACEi and SGLT2i.     Primary hypertension  Blood pressure reading is at goal, continue current antihypertensive regimen and 2 g a day dietary sodium restriction.        Anemia of chronic disease  There are no indications for erythropoietin stimulating agent therapy at present.  Will continue to monitor hemoglobin and hematocrit levels periodically.      Follow up in about 6 months (around 12/18/2025).     Orders Placed This Encounter   Procedures    Comprehensive Metabolic Panel     Standing Status:   Future     Expected Date:   12/8/2025     Expiration Date:   12/30/2025    Urinalysis, Reflex to Urine Culture     Standing Status:   Future     Expected Date:   12/8/2025     Expiration Date:   12/30/2025     Specimen Source:   Urine    Microalbumin/Creatinine Ratio, Urine     Standing Status:   Future     Expected Date:   12/8/2025     Expiration Date:   12/30/2025     Specimen Source:   Urine        Gracie Rodriguez NP  [facility]            [1]   Current Outpatient Medications:     amLODIPine (NORVASC) 10 MG tablet, Take 1 tablet (10 mg total) by mouth once daily. for 90 days, Disp: 90 tablet, Rfl: 3    ascorbic acid, vitamin C, (VITAMIN C) 1000 MG tablet, Take 1,000 mg by mouth once daily., Disp: , Rfl:     atorvastatin (LIPITOR) 40 MG tablet, Take 1 tablet (40 mg total) by mouth once daily., Disp: 90 tablet, Rfl: 3    empagliflozin (JARDIANCE) 10 mg tablet, Take 1 tablet (10 mg total) by mouth once daily., Disp: 90 tablet, Rfl: 3    finasteride (PROSCAR) 5 mg tablet, Take 1 tablet (5 mg total) by mouth once daily., Disp:  90 tablet, Rfl: 3    lisinopriL (PRINIVIL,ZESTRIL) 20 MG tablet, Take 1 tablet (20 mg total) by mouth once daily. for 90 days, Disp: 90 tablet, Rfl: 3    metFORMIN (GLUCOPHAGE) 1000 MG tablet, Take 1 tablet (1,000 mg total) by mouth 2 (two) times daily., Disp: 180 tablet, Rfl: 3    vitamin D (VITAMIN D3) 1000 units Tab, Take 1,000 Units by mouth once daily., Disp: , Rfl:     alfuzosin (UROXATRAL) 10 mg Tb24, Take 1 tablet (10 mg total) by mouth daily with breakfast., Disp: 90 tablet, Rfl: 3    aspirin (ECOTRIN) 81 MG EC tablet, Take 1 tablet (81 mg total) by mouth once daily., Disp: 90 tablet, Rfl: 3    blood sugar diagnostic Strp, Use to check blood sugar once a day (Patient not taking: Reported on 6/18/2025), Disp: 100 each, Rfl: 3    blood-glucose meter kit, by Other route. Use as instructed (Patient not taking: Reported on 6/18/2025), Disp: , Rfl:     lancets 32 gauge Misc, 1 lancet  by Misc.(Non-Drug; Combo Route) route 2 times daily 2 hours after meal. (Patient not taking: Reported on 6/18/2025), Disp: 100 each, Rfl: 5

## 2025-06-18 ENCOUNTER — OFFICE VISIT (OUTPATIENT)
Dept: NEPHROLOGY | Facility: CLINIC | Age: 75
End: 2025-06-18
Payer: MEDICARE

## 2025-06-18 VITALS
WEIGHT: 161.38 LBS | BODY MASS INDEX: 24.46 KG/M2 | OXYGEN SATURATION: 98 % | RESPIRATION RATE: 18 BRPM | SYSTOLIC BLOOD PRESSURE: 128 MMHG | HEART RATE: 49 BPM | DIASTOLIC BLOOD PRESSURE: 74 MMHG | TEMPERATURE: 98 F | HEIGHT: 68 IN

## 2025-06-18 DIAGNOSIS — I10 PRIMARY HYPERTENSION: ICD-10-CM

## 2025-06-18 DIAGNOSIS — D63.8 ANEMIA OF CHRONIC DISEASE: ICD-10-CM

## 2025-06-18 DIAGNOSIS — E11.22 TYPE 2 DIABETES MELLITUS WITH STAGE 2 CHRONIC KIDNEY DISEASE, WITHOUT LONG-TERM CURRENT USE OF INSULIN: ICD-10-CM

## 2025-06-18 DIAGNOSIS — N18.2 TYPE 2 DIABETES MELLITUS WITH STAGE 2 CHRONIC KIDNEY DISEASE, WITHOUT LONG-TERM CURRENT USE OF INSULIN: ICD-10-CM

## 2025-06-18 DIAGNOSIS — N18.2 CKD STAGE G2/A1, GFR 60-89 AND ALBUMIN CREATININE RATIO <30 MG/G: Primary | ICD-10-CM

## 2025-06-18 DIAGNOSIS — I10 HYPERTENSION, UNSPECIFIED TYPE: ICD-10-CM

## 2025-06-18 DIAGNOSIS — N40.1 BENIGN PROSTATIC HYPERPLASIA WITH URINARY FREQUENCY: ICD-10-CM

## 2025-06-18 DIAGNOSIS — R35.0 BENIGN PROSTATIC HYPERPLASIA WITH URINARY FREQUENCY: ICD-10-CM

## 2025-06-18 PROCEDURE — 1126F AMNT PAIN NOTED NONE PRSNT: CPT | Mod: CPTII,,, | Performed by: NURSE PRACTITIONER

## 2025-06-18 PROCEDURE — 99214 OFFICE O/P EST MOD 30 MIN: CPT | Mod: S$PBB,,, | Performed by: NURSE PRACTITIONER

## 2025-06-18 PROCEDURE — 1159F MED LIST DOCD IN RCRD: CPT | Mod: CPTII,,, | Performed by: NURSE PRACTITIONER

## 2025-06-18 PROCEDURE — 99215 OFFICE O/P EST HI 40 MIN: CPT | Mod: PBBFAC | Performed by: NURSE PRACTITIONER

## 2025-06-18 PROCEDURE — 3008F BODY MASS INDEX DOCD: CPT | Mod: CPTII,,, | Performed by: NURSE PRACTITIONER

## 2025-06-18 PROCEDURE — 3078F DIAST BP <80 MM HG: CPT | Mod: CPTII,,, | Performed by: NURSE PRACTITIONER

## 2025-06-18 PROCEDURE — 4010F ACE/ARB THERAPY RXD/TAKEN: CPT | Mod: CPTII,,, | Performed by: NURSE PRACTITIONER

## 2025-06-18 PROCEDURE — 1101F PT FALLS ASSESS-DOCD LE1/YR: CPT | Mod: CPTII,,, | Performed by: NURSE PRACTITIONER

## 2025-06-18 PROCEDURE — 3051F HG A1C>EQUAL 7.0%<8.0%: CPT | Mod: CPTII,,, | Performed by: NURSE PRACTITIONER

## 2025-06-18 PROCEDURE — 3066F NEPHROPATHY DOC TX: CPT | Mod: CPTII,,, | Performed by: NURSE PRACTITIONER

## 2025-06-18 PROCEDURE — 3074F SYST BP LT 130 MM HG: CPT | Mod: CPTII,,, | Performed by: NURSE PRACTITIONER

## 2025-06-18 PROCEDURE — 1160F RVW MEDS BY RX/DR IN RCRD: CPT | Mod: CPTII,,, | Performed by: NURSE PRACTITIONER

## 2025-06-18 PROCEDURE — 3288F FALL RISK ASSESSMENT DOCD: CPT | Mod: CPTII,,, | Performed by: NURSE PRACTITIONER

## 2025-06-18 RX ORDER — ASPIRIN 81 MG/1
81 TABLET ORAL DAILY
Qty: 90 TABLET | Refills: 3 | Status: SHIPPED | OUTPATIENT
Start: 2025-06-18 | End: 2026-06-18

## 2025-06-18 RX ORDER — ATORVASTATIN CALCIUM 40 MG/1
40 TABLET, FILM COATED ORAL DAILY
Qty: 90 TABLET | Refills: 3 | Status: SHIPPED | OUTPATIENT
Start: 2025-06-18 | End: 2026-06-18

## 2025-06-18 RX ORDER — LISINOPRIL 20 MG/1
20 TABLET ORAL DAILY
Qty: 90 TABLET | Refills: 3 | Status: SHIPPED | OUTPATIENT
Start: 2025-06-18 | End: 2026-06-18

## 2025-06-18 RX ORDER — ATORVASTATIN CALCIUM 40 MG/1
40 TABLET, FILM COATED ORAL DAILY
Qty: 90 TABLET | Refills: 3 | OUTPATIENT
Start: 2025-06-18 | End: 2026-06-18

## 2025-06-18 RX ORDER — ALFUZOSIN HYDROCHLORIDE 10 MG/1
10 TABLET, EXTENDED RELEASE ORAL
Qty: 90 TABLET | Refills: 3 | Status: SHIPPED | OUTPATIENT
Start: 2025-06-18 | End: 2026-06-18

## 2025-06-18 RX ORDER — AMLODIPINE BESYLATE 10 MG/1
10 TABLET ORAL DAILY
Qty: 90 TABLET | Refills: 3 | Status: SHIPPED | OUTPATIENT
Start: 2025-06-18 | End: 2026-06-13

## 2025-06-18 NOTE — TELEPHONE ENCOUNTER
LV= 5/12/25  NV= 9/29/25    Rx Refill request. Medication went to wrong pharmacy. Needs to go to [facility].

## 2025-06-27 ENCOUNTER — OFFICE VISIT (OUTPATIENT)
Dept: UROLOGY | Facility: CLINIC | Age: 75
End: 2025-06-27
Payer: MEDICARE

## 2025-06-27 VITALS
HEIGHT: 68 IN | BODY MASS INDEX: 23.95 KG/M2 | HEART RATE: 51 BPM | DIASTOLIC BLOOD PRESSURE: 71 MMHG | OXYGEN SATURATION: 98 % | WEIGHT: 158 LBS | RESPIRATION RATE: 18 BRPM | SYSTOLIC BLOOD PRESSURE: 133 MMHG

## 2025-06-27 DIAGNOSIS — R35.0 URINARY FREQUENCY: ICD-10-CM

## 2025-06-27 DIAGNOSIS — R35.0 BENIGN PROSTATIC HYPERPLASIA WITH URINARY FREQUENCY: Primary | ICD-10-CM

## 2025-06-27 DIAGNOSIS — N40.1 BENIGN PROSTATIC HYPERPLASIA WITH URINARY FREQUENCY: Primary | ICD-10-CM

## 2025-06-27 DIAGNOSIS — R39.15 URINARY URGENCY: ICD-10-CM

## 2025-06-27 PROCEDURE — 99214 OFFICE O/P EST MOD 30 MIN: CPT | Mod: PBBFAC | Performed by: NURSE PRACTITIONER

## 2025-06-27 NOTE — PROGRESS NOTES
Chief Complaint:   Chief Complaint   Patient presents with    Follow-up       HPI:   Patient is a 74-year-old male here for 6-month follow-up for urinary urgency, urinary frequency, BPH.   Patient on alfuzosin 10 mg p.o. daily and Proscar 5 mg p.o. daily.  Today patient continues to present with stable symptoms of urinary urgency and frequency he denies any dysuria, urinary incontinence, urinary retention, gross hematuria, nocturia.  PSA 1.72 due to patient on Proscar actual PSA 3.44.      Allergies:  Review of patient's allergies indicates:  No Known Allergies    Medications:  Current Medications[1]    Review of Systems:  General: No fever, chills, fatigability, or weight loss.  Skin: No rashes, itching, or changes in color or texture of skin.  Chest: Denies HYMAN, cyanosis, wheezing, cough, and sputum production.  Abdomen: Appetite fine. No weight loss. Denies diarrhea, abdominal pain, hematemesis, or blood in stool.  Musculoskeletal: No joint stiffness or swelling. Denies back pain.  : As above.  All other review of systems negative.    PMH:  Past Medical History:   Diagnosis Date    Aneurysm of ascending aorta     Cataract     CKD     DM     HFpEF     HTN        PSH:  Past Surgical History:   Procedure Laterality Date    stitches  09/14/2022    left fore finger       FamHx:  Family History   Problem Relation Name Age of Onset    Hypertension Father      Diabetes Brother         SocHx:  Social History[2]    Physical Exam:  There were no vitals filed for this visit.  General: A&Ox3, no apparent distress, no deformities  Neck: No masses, normal thyroid  Lungs: CTA angela, no use of accessory muscles  Heart: RRR, no arrhythmias  Abdomen: Soft, NT, ND, no masses, no hernias, no hepatosplenomegaly  Lymphatic: Neck and groin nodes negative  Skin: The skin is warm and dry. No jaundice.  Ext: No c/c/e.    GUMale:  Patient deferred NABOR this time per    Labs:    Latest Reference Range & Units 02/02/23 10:08 08/10/23 09:50  02/15/24 08:41 06/25/24 09:39 09/30/24 07:36 12/24/24 08:36 05/12/25 08:50   Prostate Specific Antigen <=4.00 ng/mL 2.44 1.94 1.69 2.43 1.42 1.10 1.72         Impression:  BPH  Urinary urgency    Plan:  Instructed patient to continue alfuzosin 10 mg p.o. daily and Proscar 5 mg p.o. daily.  Instructed patient on timed voiding every 2-3 hrs, double voiding, avoidance of bladder irritants such as alcohol, citrus foods, chocolate, caffeinated drinks, energy drinks, spicy foods, sugar, caffeine products, sodas. Instructed patient to avoid drinking fluids 1-2 hours prior to bedtime & void immediately before bedtime.   RTC six-month with PSA and bladder scan.  Instructed patient if develops any abnormal urologic symptoms notify clinic to be re-evaluate treated or during after hours go to emergency room versus urgent here.                           [facility]         [1]   Current Outpatient Medications   Medication Sig Dispense Refill    alfuzosin (UROXATRAL) 10 mg Tb24 Take 1 tablet (10 mg total) by mouth daily with breakfast. 90 tablet 3    amLODIPine (NORVASC) 10 MG tablet Take 1 tablet (10 mg total) by mouth once daily. for 90 days 90 tablet 3    ascorbic acid, vitamin C, (VITAMIN C) 1000 MG tablet Take 1,000 mg by mouth once daily.      aspirin (ECOTRIN) 81 MG EC tablet Take 1 tablet (81 mg total) by mouth once daily. 90 tablet 3    atorvastatin (LIPITOR) 40 MG tablet Take 1 tablet (40 mg total) by mouth once daily. 90 tablet 3    blood sugar diagnostic Strp Use to check blood sugar once a day (Patient not taking: Reported on 6/18/2025) 100 each 3    blood-glucose meter kit by Other route. Use as instructed (Patient not taking: Reported on 6/18/2025)      empagliflozin (JARDIANCE) 10 mg tablet Take 1 tablet (10 mg total) by mouth once daily. 90 tablet 3    finasteride (PROSCAR) 5 mg tablet Take 1 tablet (5 mg total) by mouth once daily. 90 tablet 3    lancets 32 gauge Misc 1 lancet  by Misc.(Non-Drug; Combo Route) route 2  times daily 2 hours after meal. (Patient not taking: Reported on 6/18/2025) 100 each 5    lisinopriL (PRINIVIL,ZESTRIL) 20 MG tablet Take 1 tablet (20 mg total) by mouth once daily. for 90 days 90 tablet 3    metFORMIN (GLUCOPHAGE) 1000 MG tablet Take 1 tablet (1,000 mg total) by mouth 2 (two) times daily. 180 tablet 3    vitamin D (VITAMIN D3) 1000 units Tab Take 1,000 Units by mouth once daily.       No current facility-administered medications for this visit.   [2]   Social History  Socioeconomic History    Marital status:     Number of children: 2   Tobacco Use    Smoking status: Never    Smokeless tobacco: Never   Vaping Use    Vaping status: Never Used   Substance and Sexual Activity    Alcohol use: Not Currently    Drug use: Never    Sexual activity: Not Currently     Social Drivers of Health     Financial Resource Strain: Low Risk  (5/12/2025)    Overall Financial Resource Strain (CARDIA)     Difficulty of Paying Living Expenses: Not hard at all   Food Insecurity: No Food Insecurity (5/12/2025)    Hunger Vital Sign     Worried About Running Out of Food in the Last Year: Never true     Ran Out of Food in the Last Year: Never true   Transportation Needs: No Transportation Needs (5/12/2025)    PRAPARE - Transportation     Lack of Transportation (Medical): No     Lack of Transportation (Non-Medical): No   Physical Activity: Sufficiently Active (5/12/2025)    Exercise Vital Sign     Days of Exercise per Week: 3 days     Minutes of Exercise per Session: 60 min   Recent Concern: Physical Activity - Insufficiently Active (2/17/2025)    Exercise Vital Sign     Days of Exercise per Week: 4 days     Minutes of Exercise per Session: 30 min   Stress: No Stress Concern Present (5/12/2025)    [employer] - Occupational Stress Questionnaire     Feeling of Stress : Not at all   Housing Stability: Low Risk  (5/12/2025)    Housing Stability Vital Sign     Unable to Pay for Housing in the  Last Year: No     Homeless in the Last Year: No

## 2025-06-27 NOTE — PROGRESS NOTES
Patient seen by BUSTER Diego 6 months with PSA. Written and verbal discharge instructions given.

## 2025-07-03 ENCOUNTER — TELEPHONE (OUTPATIENT)
Dept: INTERNAL MEDICINE | Facility: CLINIC | Age: 75
End: 2025-07-03
Payer: MEDICARE

## 2025-07-03 DIAGNOSIS — Z12.11 ENCOUNTER FOR COLORECTAL CANCER SCREENING: Primary | ICD-10-CM

## 2025-07-03 DIAGNOSIS — Z12.12 ENCOUNTER FOR COLORECTAL CANCER SCREENING: Primary | ICD-10-CM

## 2025-07-03 NOTE — TELEPHONE ENCOUNTER
LV= 5/12/25  NV= 9/29/25 with Dr Long    Patient received letter that it is time for colon screen. Can you order a Cologuard to be sent to him? He will try to have done before your visit in September. He was suppose to get one done before,  but he states he never received it. Let me know if you want him to wait until his office visit instead? Thanks.